# Patient Record
Sex: FEMALE | Race: WHITE | NOT HISPANIC OR LATINO | Employment: FULL TIME | URBAN - METROPOLITAN AREA
[De-identification: names, ages, dates, MRNs, and addresses within clinical notes are randomized per-mention and may not be internally consistent; named-entity substitution may affect disease eponyms.]

---

## 2020-09-18 DIAGNOSIS — M25.561 KNEE PAIN, RIGHT: Primary | ICD-10-CM

## 2020-10-08 ENCOUNTER — CLINICAL SUPPORT (OUTPATIENT)
Dept: REHABILITATION | Facility: HOSPITAL | Age: 70
End: 2020-10-08
Payer: MEDICARE

## 2020-10-08 DIAGNOSIS — M25.561 CHRONIC PAIN OF RIGHT KNEE: ICD-10-CM

## 2020-10-08 DIAGNOSIS — G89.29 CHRONIC PAIN OF RIGHT KNEE: ICD-10-CM

## 2020-10-08 DIAGNOSIS — M25.561 KNEE PAIN, RIGHT: ICD-10-CM

## 2020-10-08 PROCEDURE — 97161 PT EVAL LOW COMPLEX 20 MIN: CPT | Mod: PN

## 2020-10-08 PROCEDURE — 97110 THERAPEUTIC EXERCISES: CPT | Mod: PN

## 2020-10-08 NOTE — PLAN OF CARE
Physical Therapy Evaluation/Plan of Care    Name: Heri Jansen 1950  Clinic Number: [id number]    Diagnosis:   Encounter Diagnosis   Name Primary?    Knee pain, right      Physician: Mario Khan MD  Treatment Orders: PT Eval and Treat    Past Medical History:   Diagnosis Date    Anxiety      Current Outpatient Medications   Medication Sig    alprazolam (XANAX XR) 1 MG Tb24 Take 0.5 mg by mouth once daily.    hydrocodone-acetaminophen 7.5-325mg (NORCO) 7.5-325 mg per tablet Take 1 tablet by mouth every 6 (six) hours as needed for Pain.    lidocaine HCl 2% (XYLOCAINE) 2 % Soln Take 5 mLs by mouth every 4 (four) hours.     No current facility-administered medications for this visit.      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 female that presents to [facility] secondary to chronic right knee pain. Heri states she had 2 previous falls in the past few years precipitating progressive onset of right knee pain. Despite pain she has been able to continue working full time at [employer] managing it conservatively with steroid injections, most recently in July and again on 9/11/20. She is referred to physical therapy evaluation and treatment. Of note she has bilateral genu valgus, R > L, indicating DJD.    Imaging: available in Epic.    Pain: current 0/10, worst 4/10, best 0/10, stiff, intermittent  Radicular symptoms:  none  Aggravating factors: squatting, stretching up to reach overhead   Easing factors: OTC topicals    Pts goals: to stop having old age problems    Onset/PRECIOUS: gradual and progressive    Objective     Observation: Patient is a well developed, well nourished pleasant female in NAD.  Posture: bilateral genu valgus R > L    Range of Motion:   Knee Left active Left Passive Right Active R passive   Flexion 134  WNL   Extension 0 WNL 0 WNL     Lower Extremity Strength  Right LE  Left LE    Knee extension: 4+/5 Knee extension: 5/5   Knee flexion: 4/5 Knee flexion: 5/5   Hip flexion: 4+/5 Hip flexion: 5/5   Hip extension:  4-/5 Hip extension: 4/5   Hip abduction: 4/5 Hip abduction: 4+/5   Hip adduction: 5/5 Hip adduction 5/5   Ankle dorsiflexion: 5/5 Ankle dorsiflexion: 5/5   Ankle plantarflexion: 5/5 Ankle plantarflexion: 5/5     Special Tests:   Left Right   Valgus Stress Test     neg neg   Varus Stress test neg neg   Lachman's test neg neg   Posterior Lachman neg neg   Rita's Test neg neg   Apley's Compression neg neg   Apley's Distraction neg neg     - SLS R: can lift leg up independently but is unable to hold >5 sec utilizing stepping strategy to recover   - SLS L: can lift leg up independently but is unable to hold >5 sec  utilizing stepping strategy to recover   - 30 sec chair rise: 11 completed   Joint Mobility: no restrictions noted  Palpation: no significant ttp noted  Sensation: intact  Flexibility:  90/90 SLR = R minimal restriction, L minimal restriction           Ely's test: R moderate restriction, L moderate restriction           Gumaro's test: R no restriction, L no restriction           Ashwin test: R no restriction, L no restriction  Edema: no significant edema/swelling noted.    Functional Limitations Reports   Tool: LEFS  Score: 10% LE impairment (72/80)    TREATMENT:  Nustep Lv 3 x 10 min    Home Exercises and Patient Education Provided    Education provided re:   - progress towards goals   -  role of therapy in multi - disciplinary team, goals for therapy  Pt educated on condition, POC, and expectations in therapy.  No spiritual or educational barriers to learning provided    Home exercises: Pt will be provided HEP during course of treatment with progressions as appropriate. Pt was advised to perform these exercises free of pain, and to stop performing them if pain occurs.   LAURA demonstrated good  understanding of the education provided.     PT Evaluation Completed: Yes  Discussed Plan of Care with patient: Yes    Assessment     Heri is a 70 y.o. female referred to outpatient physical therapy and presents to PT with chronic OA right knee. Patient demonstrates limitations as described in the problem list. Pt will benefit from physcial therapy services in order to maximize pain free and/or functional use of right knee. The following goals were discussed with the patient and patient is in agreement with them as to be addressed in the treatment plan.   Pt prognosis is Good.   Pt will benefit from skilled outpatient Physical Therapy to address the deficits stated above and in the chart below, provide pt/family education, and to maximize pt's level of independence.     Anticipated barriers to physical therapy: none identified    Medical necessity is demonstrated by the following IMPAIRMENTS/PROBLEM LIST:  weakness and pain     GOALS:   Long Term Goals: 6 weeks  Pain: Decrease pain to 0/10 75% of the time to allow for improved function  Strength: Improve strength in right hip to knee to 5/5 for improved LE stability  Functional scale: Improve score on LEFS to 0%  Walking: Increase walking distance/duration to 1/4 mile without pain  Postures: Increase sitting and/or standing duration to 1 hour without pain   Transfers: Perform all transfers without increased pain or limitation  Exercise: demonstrate independence with home exercise program to maintain gains made in therapy.      Plan     Pt will be treated  by physical therapy 2 times a week for 6 weeks for Pt Education, HEP, therapeutic exercises, neuromuscular re-education, joint mobilizations, modalities prn to achieve established goals. Heri may at times be seen by a PTA as part of the Rehab Team.     Cont PT for 6 weeks.     Suman Hanna, PT    I certify the need for these services furnished under this plan of treatment and while under my care.______________________________ Physician/Referring Practitioner  Date of Signature        PT/PTA met face to face to discuss patient's treatment plan and progress towards established goals.  Treatment will be continued as described in initial report/eval and progress notes.  Patient will be seen by physical therapist every sixth visit and minimally once per month.

## 2020-10-16 ENCOUNTER — CLINICAL SUPPORT (OUTPATIENT)
Dept: REHABILITATION | Facility: HOSPITAL | Age: 70
End: 2020-10-16
Payer: MEDICARE

## 2020-10-16 DIAGNOSIS — G89.29 CHRONIC PAIN OF RIGHT KNEE: ICD-10-CM

## 2020-10-16 DIAGNOSIS — M25.561 CHRONIC PAIN OF RIGHT KNEE: ICD-10-CM

## 2020-10-16 DIAGNOSIS — R53.1 WEAKNESS: ICD-10-CM

## 2020-10-16 PROCEDURE — 97110 THERAPEUTIC EXERCISES: CPT | Mod: PN

## 2020-10-16 NOTE — PROGRESS NOTES
"                            Physical Therapy Daily Treatment Note   Name: Heri Jansen 1950  MRN: [id number]    Visit Date: 10/16/2020  Visit #: 2 / 12  Authorization period Expiration: 12/31/20    Plan of Care Expiration: 12/31/20  Precautions: standard    Time In: 11:00 am  Time Out: 11:45 am  Total 1:1 Treatment Time: 38 min    Treatment Diagnosis:   Encounter Diagnoses   Name Primary?    Weakness     Chronic pain of right knee      Physician: Mario Khan MD    Subjective   Pt reports: she is doing pretty good today.     Pain Scale:  2/10 on VAS currently  Pain Location: right lateral knee    Objective   LAURA received therapeutic exercises to develop strength, endurance, flexibility, posture and core stabilization for 38 minutes including:  Nustpep Lv 3 x 15 min  Supine HS stretch w strap, 20" h x 5 ea  B SAQ's w grey bolster w 5 " h x 2 min  SLR x 2 min ea  S/L hip abd x 2 min ea  H/L ball squeeze x 2 min  Supine clams w light green theraloop x 2 min    Home Exercises and Education Provided     Education provided re: use of ice/heat at home as needed for pain/swelling  - progress towards goals   - role of therapy in multi - disciplinary team, goals for therapy  Pt educated on condition, POC, and expectations in therapy.  No spiritual or educational barriers to learning provided    Home exercises:  Pt will be provided HEP during course of treatment with progressions as appropriate. Pt was advised to perform these exercises free of pain, and to stop performing them if pain occurs.   LAURA demonstrated good  understanding of the education provided.     Assessment   LAURA tolerated treatment without complication or increase pain reported. Patient with B genu valgus, R>L, secondary to OA. Will progress with strengthening exercises as tolerated.     Pt prognosis is Good. Pt will continue to benefit from skilled outpatient physical therapy to address the deficits listed in the problem list chart " on initial evaluation, provide pt/family education and to maximize pt's level of independence in the home and community environment.     Medical necessity is demonstrated by the impairments and functional limitations listed on the Initial Evaluation.     Anticipated barriers to physical therapy: none identified  Pt's spiritual, cultural and educational needs considered and pt agreeable to plan of care and goals.    Plan   Continue with established Plan of Care towards Physical Therapy goals.   Discussed Plan of Care with patient: Yes    Suman Hanna, PT  10/16/2020

## 2020-10-19 ENCOUNTER — CLINICAL SUPPORT (OUTPATIENT)
Dept: REHABILITATION | Facility: HOSPITAL | Age: 70
End: 2020-10-19
Payer: MEDICARE

## 2020-10-19 DIAGNOSIS — M25.561 CHRONIC PAIN OF RIGHT KNEE: ICD-10-CM

## 2020-10-19 DIAGNOSIS — R53.1 WEAKNESS: Primary | ICD-10-CM

## 2020-10-19 DIAGNOSIS — G89.29 CHRONIC PAIN OF RIGHT KNEE: ICD-10-CM

## 2020-10-19 PROCEDURE — 97110 THERAPEUTIC EXERCISES: CPT | Mod: PN,CQ

## 2020-10-19 NOTE — PROGRESS NOTES
"                            Physical Therapy Daily Treatment Note   Name: Heri Jansen 1950  MRN: [id number]    Visit Date: 10/19/2020  Visit #: 3 / 12  Authorization period Expiration: 12/31/20    Plan of Care Expiration: 12/31/20  Precautions: standard    Time In: 2:00 PM  Time Out: 3:00 PM  Total 1:1 Treatment Time: 45 min    Treatment Diagnosis:   Encounter Diagnoses   Name Primary?    Weakness Yes    Chronic pain of right knee      Physician: Mario Khan MD    Subjective   Pt reports: No new c/o's.     Pain Scale:  0/10 on VAS currently  Pain Location: right lateral knee    Objective   LAURA received therapeutic exercises to develop strength, endurance, flexibility, posture and core stabilization for 45 minutes including:  Nustpep Lv 3 x 20 min  Bridges x 15  Supine HS stretch w strap, 20" h x 5 ea  B SAQ's w grey bolster w 5 " h x 3 min  SLR 2 x 10  S/L hip abd x 20  H/L ball squeeze x 3 min  Supine clams w light green theraloop x 2 min    Home Exercises and Education Provided     Education provided re: use of ice/heat at home as needed for pain/swelling  - progress towards goals   - role of therapy in multi - disciplinary team, goals for therapy  Pt educated on condition, POC, and expectations in therapy.  No spiritual or educational barriers to learning provided    Home exercises:  Pt will be provided HEP during course of treatment with progressions as appropriate. Pt was advised to perform these exercises free of pain, and to stop performing them if pain occurs.   LAURA demonstrated good  understanding of the education provided.     Assessment   LAURA tolerated treatment without complication or increase pain reported. Patient with B genu valgus, R>L, secondary to OA. Will progress with strengthening exercises as tolerated.     Pt prognosis is Good. Pt will continue to benefit from skilled outpatient physical therapy to address the deficits listed in the problem list chart on initial " evaluation, provide pt/family education and to maximize pt's level of independence in the home and community environment.     Medical necessity is demonstrated by the impairments and functional limitations listed on the Initial Evaluation.     Anticipated barriers to physical therapy: none identified  Pt's spiritual, cultural and educational needs considered and pt agreeable to plan of care and goals.    Plan   Continue with established Plan of Care towards Physical Therapy goals.   Discussed Plan of Care with patient: Yes    Jonathan Favre, PTA  10/19/2020

## 2020-10-22 ENCOUNTER — CLINICAL SUPPORT (OUTPATIENT)
Dept: REHABILITATION | Facility: HOSPITAL | Age: 70
End: 2020-10-22
Payer: MEDICARE

## 2020-10-22 DIAGNOSIS — M25.561 CHRONIC PAIN OF RIGHT KNEE: ICD-10-CM

## 2020-10-22 DIAGNOSIS — G89.29 CHRONIC PAIN OF RIGHT KNEE: ICD-10-CM

## 2020-10-22 DIAGNOSIS — R53.1 WEAKNESS: Primary | ICD-10-CM

## 2020-10-22 PROCEDURE — 97110 THERAPEUTIC EXERCISES: CPT | Mod: PN,CQ

## 2020-10-22 NOTE — PROGRESS NOTES
"                            Physical Therapy Daily Treatment Note   Name: Heri Jansen 1950  MRN: [id number]    Visit Date: 10/22/2020  Visit #: 4 / 12  Authorization period Expiration: 12/31/20    Plan of Care Expiration: 12/31/20  Precautions: standard    Time In: 1:55 PM  Time Out: 2:45 PM  Total 1:1 Treatment Time: 45 min    Treatment Diagnosis:   Encounter Diagnoses   Name Primary?    Weakness Yes    Chronic pain of right knee      Physician: Mario Khan MD    Subjective   Pt reports: No new c/o's.     Pain Scale:  0/10 on VAS currently  Pain Location: right lateral knee    Objective   LAURA received therapeutic exercises to develop strength, endurance, flexibility, posture and core stabilization for 45 minutes including:  Nustpep Lv 3 x 20 min  Bridges x 15  Supine HS stretch w strap, 20" h x 5 ea  B SAQ's w grey bolster w 5 " h x 4 min  SLR 2 x 10  S/L hip abd x 20  H/L ball squeeze x 3 min  Supine clams w light green theraloop x 3 min    Home Exercises and Education Provided     Education provided re: use of ice/heat at home as needed for pain/swelling  - progress towards goals   - role of therapy in multi - disciplinary team, goals for therapy  Pt educated on condition, POC, and expectations in therapy.  No spiritual or educational barriers to learning provided    Home exercises:  Pt will be provided HEP during course of treatment with progressions as appropriate. Pt was advised to perform these exercises free of pain, and to stop performing them if pain occurs.   LAURA demonstrated good  understanding of the education provided.     Assessment   LAURA tolerated treatment without complication or increase pain reported. Patient with B genu valgus, R>L, secondary to OA. Will progress with strengthening exercises as tolerated.     Pt prognosis is Good. Pt will continue to benefit from skilled outpatient physical therapy to address the deficits listed in the problem list chart on initial " evaluation, provide pt/family education and to maximize pt's level of independence in the home and community environment.     Medical necessity is demonstrated by the impairments and functional limitations listed on the Initial Evaluation.     Anticipated barriers to physical therapy: none identified  Pt's spiritual, cultural and educational needs considered and pt agreeable to plan of care and goals.    Plan   Continue with established Plan of Care towards Physical Therapy goals.   Discussed Plan of Care with patient: Yes    Jonathan Favre, PTA  10/22/2020

## 2021-07-26 ENCOUNTER — PATIENT OUTREACH (OUTPATIENT)
Dept: ADMINISTRATIVE | Facility: HOSPITAL | Age: 71
End: 2021-07-26

## 2021-07-28 DIAGNOSIS — M47.9 SPONDYLOSIS: ICD-10-CM

## 2021-07-28 DIAGNOSIS — M54.50 LOW BACK PAIN: Primary | ICD-10-CM

## 2021-07-28 DIAGNOSIS — M81.0 OSTEOPOROSIS: ICD-10-CM

## 2021-08-06 ENCOUNTER — CLINICAL SUPPORT (OUTPATIENT)
Dept: REHABILITATION | Facility: HOSPITAL | Age: 71
End: 2021-08-06
Payer: MEDICARE

## 2021-08-06 DIAGNOSIS — M54.50 CHRONIC BILATERAL LOW BACK PAIN WITHOUT SCIATICA: ICD-10-CM

## 2021-08-06 DIAGNOSIS — M81.0 OSTEOPOROSIS: ICD-10-CM

## 2021-08-06 DIAGNOSIS — M25.561 CHRONIC PAIN OF RIGHT KNEE: ICD-10-CM

## 2021-08-06 DIAGNOSIS — M47.9 SPONDYLOSIS: ICD-10-CM

## 2021-08-06 DIAGNOSIS — M54.50 LOW BACK PAIN: ICD-10-CM

## 2021-08-06 DIAGNOSIS — R53.1 WEAKNESS: ICD-10-CM

## 2021-08-06 DIAGNOSIS — G89.29 CHRONIC PAIN OF RIGHT KNEE: ICD-10-CM

## 2021-08-06 DIAGNOSIS — G89.29 CHRONIC BILATERAL LOW BACK PAIN WITHOUT SCIATICA: ICD-10-CM

## 2021-08-06 PROCEDURE — 97161 PT EVAL LOW COMPLEX 20 MIN: CPT | Mod: PN

## 2021-08-13 ENCOUNTER — CLINICAL SUPPORT (OUTPATIENT)
Dept: REHABILITATION | Facility: HOSPITAL | Age: 71
End: 2021-08-13
Payer: MEDICARE

## 2021-08-13 DIAGNOSIS — R53.1 WEAKNESS: ICD-10-CM

## 2021-08-13 DIAGNOSIS — M54.50 LUMBAR PAIN: Primary | ICD-10-CM

## 2021-08-13 PROCEDURE — 97110 THERAPEUTIC EXERCISES: CPT | Mod: PN,CQ

## 2021-08-20 ENCOUNTER — CLINICAL SUPPORT (OUTPATIENT)
Dept: REHABILITATION | Facility: HOSPITAL | Age: 71
End: 2021-08-20
Payer: MEDICARE

## 2021-08-20 DIAGNOSIS — M54.50 LUMBAR PAIN: ICD-10-CM

## 2021-08-20 DIAGNOSIS — R53.1 WEAKNESS: Primary | ICD-10-CM

## 2021-08-20 PROBLEM — M25.561 CHRONIC PAIN OF RIGHT KNEE: Status: RESOLVED | Noted: 2020-10-16 | Resolved: 2021-08-20

## 2021-08-20 PROBLEM — G89.29 CHRONIC PAIN OF RIGHT KNEE: Status: RESOLVED | Noted: 2020-10-16 | Resolved: 2021-08-20

## 2021-08-20 PROCEDURE — 97110 THERAPEUTIC EXERCISES: CPT | Mod: PN,CQ

## 2021-08-27 ENCOUNTER — CLINICAL SUPPORT (OUTPATIENT)
Dept: REHABILITATION | Facility: HOSPITAL | Age: 71
End: 2021-08-27
Payer: MEDICARE

## 2021-08-27 DIAGNOSIS — M54.50 CHRONIC MIDLINE LOW BACK PAIN WITHOUT SCIATICA: ICD-10-CM

## 2021-08-27 DIAGNOSIS — G89.29 CHRONIC MIDLINE LOW BACK PAIN WITHOUT SCIATICA: ICD-10-CM

## 2021-08-27 PROCEDURE — 97110 THERAPEUTIC EXERCISES: CPT | Mod: PN

## 2021-09-10 ENCOUNTER — CLINICAL SUPPORT (OUTPATIENT)
Dept: REHABILITATION | Facility: HOSPITAL | Age: 71
End: 2021-09-10
Payer: MEDICARE

## 2021-09-10 DIAGNOSIS — M54.50 CHRONIC MIDLINE LOW BACK PAIN WITHOUT SCIATICA: Primary | ICD-10-CM

## 2021-09-10 DIAGNOSIS — G89.29 CHRONIC MIDLINE LOW BACK PAIN WITHOUT SCIATICA: Primary | ICD-10-CM

## 2021-09-10 PROCEDURE — 97110 THERAPEUTIC EXERCISES: CPT | Mod: PN,CQ

## 2021-09-24 ENCOUNTER — CLINICAL SUPPORT (OUTPATIENT)
Dept: REHABILITATION | Facility: HOSPITAL | Age: 71
End: 2021-09-24
Payer: MEDICARE

## 2021-09-24 DIAGNOSIS — M54.50 CHRONIC MIDLINE LOW BACK PAIN WITHOUT SCIATICA: Primary | ICD-10-CM

## 2021-09-24 DIAGNOSIS — G89.29 CHRONIC MIDLINE LOW BACK PAIN WITHOUT SCIATICA: Primary | ICD-10-CM

## 2021-09-24 PROCEDURE — 97110 THERAPEUTIC EXERCISES: CPT | Mod: PN,CQ

## 2021-10-01 ENCOUNTER — CLINICAL SUPPORT (OUTPATIENT)
Dept: REHABILITATION | Facility: HOSPITAL | Age: 71
End: 2021-10-01
Payer: MEDICARE

## 2021-10-01 DIAGNOSIS — M54.50 CHRONIC MIDLINE LOW BACK PAIN WITHOUT SCIATICA: Primary | ICD-10-CM

## 2021-10-01 DIAGNOSIS — G89.29 CHRONIC MIDLINE LOW BACK PAIN WITHOUT SCIATICA: Primary | ICD-10-CM

## 2021-10-01 PROCEDURE — 97110 THERAPEUTIC EXERCISES: CPT | Mod: PN,CQ

## 2021-10-08 ENCOUNTER — CLINICAL SUPPORT (OUTPATIENT)
Dept: REHABILITATION | Facility: HOSPITAL | Age: 71
End: 2021-10-08
Payer: MEDICARE

## 2021-10-08 DIAGNOSIS — G89.29 CHRONIC MIDLINE LOW BACK PAIN WITHOUT SCIATICA: Primary | ICD-10-CM

## 2021-10-08 DIAGNOSIS — M54.50 CHRONIC MIDLINE LOW BACK PAIN WITHOUT SCIATICA: Primary | ICD-10-CM

## 2021-10-08 PROCEDURE — 97110 THERAPEUTIC EXERCISES: CPT | Mod: PN,CQ

## 2021-10-15 ENCOUNTER — CLINICAL SUPPORT (OUTPATIENT)
Dept: REHABILITATION | Facility: HOSPITAL | Age: 71
End: 2021-10-15
Payer: MEDICARE

## 2021-10-15 DIAGNOSIS — G89.29 CHRONIC MIDLINE LOW BACK PAIN WITHOUT SCIATICA: ICD-10-CM

## 2021-10-15 DIAGNOSIS — M54.50 CHRONIC MIDLINE LOW BACK PAIN WITHOUT SCIATICA: ICD-10-CM

## 2021-10-15 PROCEDURE — 97110 THERAPEUTIC EXERCISES: CPT | Mod: PN

## 2021-10-21 ENCOUNTER — TELEPHONE (OUTPATIENT)
Dept: FAMILY MEDICINE | Facility: CLINIC | Age: 71
End: 2021-10-21

## 2021-10-21 ENCOUNTER — PATIENT MESSAGE (OUTPATIENT)
Dept: FAMILY MEDICINE | Facility: CLINIC | Age: 71
End: 2021-10-21
Payer: MEDICARE

## 2021-10-29 ENCOUNTER — CLINICAL SUPPORT (OUTPATIENT)
Dept: REHABILITATION | Facility: HOSPITAL | Age: 71
End: 2021-10-29
Payer: MEDICARE

## 2021-10-29 DIAGNOSIS — M54.50 CHRONIC MIDLINE LOW BACK PAIN WITHOUT SCIATICA: Primary | ICD-10-CM

## 2021-10-29 DIAGNOSIS — G89.29 CHRONIC MIDLINE LOW BACK PAIN WITHOUT SCIATICA: Primary | ICD-10-CM

## 2021-10-29 PROCEDURE — 97110 THERAPEUTIC EXERCISES: CPT | Mod: PN,CQ

## 2021-11-11 ENCOUNTER — HOSPITAL ENCOUNTER (INPATIENT)
Facility: HOSPITAL | Age: 71
LOS: 7 days | Discharge: HOME HEALTH | DRG: 522 | End: 2021-11-18
Attending: HOSPITALIST | Admitting: HOSPITALIST
Payer: MEDICARE

## 2021-11-11 DIAGNOSIS — Z01.818 PRE-OP EXAM: ICD-10-CM

## 2021-11-11 DIAGNOSIS — S72.001A FRACTURE OF FEMORAL NECK, RIGHT: ICD-10-CM
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
## 2021-11-11 DIAGNOSIS — Z01.818 PREOP EXAMINATION: ICD-10-CM

## 2021-11-11 DIAGNOSIS — S72.001A CLOSED FRACTURE OF NECK OF RIGHT FEMUR, INITIAL ENCOUNTER: ICD-10-CM

## 2021-11-11 DIAGNOSIS — R07.9 CHEST PAIN: ICD-10-CM

## 2021-11-11 PROBLEM — F10.10 ALCOHOL ABUSE: Status: ACTIVE | Noted: 2021-11-11

## 2021-11-11 PROBLEM — K21.9 GERD (GASTROESOPHAGEAL REFLUX DISEASE): Status: ACTIVE | Noted: 2021-11-11

## 2021-11-11 LAB
ABO + RH BLD: NORMAL
ALBUMIN SERPL BCP-MCNC: 3.4 G/DL (ref 3.5–5.2)
ALP SERPL-CCNC: 74 U/L (ref 55–135)
ALT SERPL W/O P-5'-P-CCNC: 17 U/L (ref 10–44)
ANION GAP SERPL CALC-SCNC: 10 MMOL/L (ref 8–16)
AST SERPL-CCNC: 20 U/L (ref 10–40)
BASOPHILS # BLD AUTO: 0.03 K/UL (ref 0–0.2)
BASOPHILS NFR BLD: 0.3 % (ref 0–1.9)
BILIRUB SERPL-MCNC: 2.3 MG/DL (ref 0.1–1)
BLD GP AB SCN CELLS X3 SERPL QL: NORMAL
BUN SERPL-MCNC: 8 MG/DL (ref 8–23)
CALCIUM SERPL-MCNC: 8.5 MG/DL (ref 8.7–10.5)
CHLORIDE SERPL-SCNC: 102 MMOL/L (ref 95–110)
CO2 SERPL-SCNC: 21 MMOL/L (ref 23–29)
CREAT SERPL-MCNC: 0.8 MG/DL (ref 0.5–1.4)
DIFFERENTIAL METHOD: ABNORMAL
EOSINOPHIL # BLD AUTO: 0 K/UL (ref 0–0.5)
EOSINOPHIL NFR BLD: 0.3 % (ref 0–8)
ERYTHROCYTE [DISTWIDTH] IN BLOOD BY AUTOMATED COUNT: 12.3 % (ref 11.5–14.5)
EST. GFR  (AFRICAN AMERICAN): >60 ML/MIN/1.73 M^2
EST. GFR  (NON AFRICAN AMERICAN): >60 ML/MIN/1.73 M^2
GLUCOSE SERPL-MCNC: 110 MG/DL (ref 70–110)
HCT VFR BLD AUTO: 35.8 % (ref 37–48.5)
HGB BLD-MCNC: 11.8 G/DL (ref 12–16)
IMM GRANULOCYTES # BLD AUTO: 0.03 K/UL (ref 0–0.04)
IMM GRANULOCYTES NFR BLD AUTO: 0.3 % (ref 0–0.5)
LYMPHOCYTES # BLD AUTO: 1.1 K/UL (ref 1–4.8)
LYMPHOCYTES NFR BLD: 11.9 % (ref 18–48)
MCH RBC QN AUTO: 30.6 PG (ref 27–31)
MCHC RBC AUTO-ENTMCNC: 33 G/DL (ref 32–36)
MCV RBC AUTO: 93 FL (ref 82–98)
MONOCYTES # BLD AUTO: 0.6 K/UL (ref 0.3–1)
MONOCYTES NFR BLD: 7 % (ref 4–15)
NEUTROPHILS # BLD AUTO: 7.1 K/UL (ref 1.8–7.7)
NEUTROPHILS NFR BLD: 80.2 % (ref 38–73)
NRBC BLD-RTO: 0 /100 WBC
PLATELET # BLD AUTO: 255 K/UL (ref 150–450)
PMV BLD AUTO: 10.1 FL (ref 9.2–12.9)
POTASSIUM SERPL-SCNC: 4.4 MMOL/L (ref 3.5–5.1)
PROT SERPL-MCNC: 6.8 G/DL (ref 6–8.4)
RBC # BLD AUTO: 3.86 M/UL (ref 4–5.4)
SARS-COV-2 RDRP RESP QL NAA+PROBE: NEGATIVE
SODIUM SERPL-SCNC: 133 MMOL/L (ref 136–145)
WBC # BLD AUTO: 8.84 K/UL (ref 3.9–12.7)

## 2021-11-11 PROCEDURE — 25000003 PHARM REV CODE 250: Performed by: NURSE PRACTITIONER

## 2021-11-11 PROCEDURE — 86900 BLOOD TYPING SEROLOGIC ABO: CPT | Performed by: PHYSICIAN ASSISTANT

## 2021-11-11 PROCEDURE — 93005 ELECTROCARDIOGRAM TRACING: CPT

## 2021-11-11 PROCEDURE — 93010 ELECTROCARDIOGRAM REPORT: CPT | Mod: ,,, | Performed by: GENERAL PRACTICE

## 2021-11-11 PROCEDURE — 96360 HYDRATION IV INFUSION INIT: CPT

## 2021-11-11 PROCEDURE — 36415 COLL VENOUS BLD VENIPUNCTURE: CPT | Performed by: HOSPITALIST

## 2021-11-11 PROCEDURE — 96372 THER/PROPH/DIAG INJ SC/IM: CPT

## 2021-11-11 PROCEDURE — 85025 COMPLETE CBC W/AUTO DIFF WBC: CPT | Performed by: PHYSICIAN ASSISTANT

## 2021-11-11 PROCEDURE — 25000003 PHARM REV CODE 250: Performed by: HOSPITALIST

## 2021-11-11 PROCEDURE — 80053 COMPREHEN METABOLIC PANEL: CPT | Performed by: PHYSICIAN ASSISTANT

## 2021-11-11 PROCEDURE — 25000003 PHARM REV CODE 250: Performed by: PHYSICIAN ASSISTANT

## 2021-11-11 PROCEDURE — 63600175 PHARM REV CODE 636 W HCPCS: Performed by: PHYSICIAN ASSISTANT

## 2021-11-11 PROCEDURE — 12000002 HC ACUTE/MED SURGE SEMI-PRIVATE ROOM

## 2021-11-11 PROCEDURE — U0002 COVID-19 LAB TEST NON-CDC: HCPCS | Performed by: HOSPITALIST

## 2021-11-11 PROCEDURE — 99900035 HC TECH TIME PER 15 MIN (STAT)

## 2021-11-11 PROCEDURE — 36415 COLL VENOUS BLD VENIPUNCTURE: CPT | Performed by: PHYSICIAN ASSISTANT

## 2021-11-11 PROCEDURE — 93010 EKG 12-LEAD: ICD-10-PCS | Mod: ,,, | Performed by: GENERAL PRACTICE

## 2021-11-11 PROCEDURE — 99285 EMERGENCY DEPT VISIT HI MDM: CPT | Mod: 25

## 2021-11-11 RX ORDER — IBUPROFEN 200 MG
16 TABLET ORAL
Status: DISCONTINUED | OUTPATIENT
Start: 2021-11-11 | End: 2021-11-18 | Stop reason: HOSPADM

## 2021-11-11 RX ORDER — HYDROCODONE BITARTRATE AND ACETAMINOPHEN 5; 325 MG/1; MG/1
1 TABLET ORAL
Status: DISCONTINUED | OUTPATIENT
Start: 2021-11-11 | End: 2021-11-11

## 2021-11-11 RX ORDER — GLUCAGON 1 MG
1 KIT INJECTION
Status: DISCONTINUED | OUTPATIENT
Start: 2021-11-11 | End: 2021-11-18 | Stop reason: HOSPADM

## 2021-11-11 RX ORDER — LORAZEPAM 2 MG/ML
1 INJECTION INTRAMUSCULAR EVERY 4 HOURS PRN
Status: DISCONTINUED | OUTPATIENT
Start: 2021-11-11 | End: 2021-11-18 | Stop reason: HOSPADM

## 2021-11-11 RX ORDER — IBUPROFEN 200 MG
24 TABLET ORAL
Status: DISCONTINUED | OUTPATIENT
Start: 2021-11-11 | End: 2021-11-18 | Stop reason: HOSPADM

## 2021-11-11 RX ORDER — TALC
6 POWDER (GRAM) TOPICAL NIGHTLY PRN
Status: DISCONTINUED | OUTPATIENT
Start: 2021-11-11 | End: 2021-11-18 | Stop reason: HOSPADM

## 2021-11-11 RX ORDER — HYDROCODONE BITARTRATE AND ACETAMINOPHEN 10; 325 MG/1; MG/1
1 TABLET ORAL EVERY 6 HOURS PRN
Status: DISCONTINUED | OUTPATIENT
Start: 2021-11-11 | End: 2021-11-18 | Stop reason: HOSPADM

## 2021-11-11 RX ORDER — SODIUM,POTASSIUM PHOSPHATES 280-250MG
2 POWDER IN PACKET (EA) ORAL
Status: DISCONTINUED | OUTPATIENT
Start: 2021-11-11 | End: 2021-11-18 | Stop reason: HOSPADM

## 2021-11-11 RX ORDER — LANOLIN ALCOHOL/MO/W.PET/CERES
800 CREAM (GRAM) TOPICAL
Status: DISCONTINUED | OUTPATIENT
Start: 2021-11-11 | End: 2021-11-18 | Stop reason: HOSPADM

## 2021-11-11 RX ORDER — MAG HYDROX/ALUMINUM HYD/SIMETH 200-200-20
30 SUSPENSION, ORAL (FINAL DOSE FORM) ORAL 4 TIMES DAILY PRN
Status: DISCONTINUED | OUTPATIENT
Start: 2021-11-11 | End: 2021-11-18 | Stop reason: HOSPADM

## 2021-11-11 RX ORDER — MORPHINE SULFATE 4 MG/ML
4 INJECTION, SOLUTION INTRAMUSCULAR; INTRAVENOUS
Status: COMPLETED | OUTPATIENT
Start: 2021-11-11 | End: 2021-11-11

## 2021-11-11 RX ORDER — ACETAMINOPHEN 325 MG/1
650 TABLET ORAL EVERY 4 HOURS PRN
Status: DISCONTINUED | OUTPATIENT
Start: 2021-11-11 | End: 2021-11-18 | Stop reason: HOSPADM

## 2021-11-11 RX ORDER — ONDANSETRON 8 MG/1
8 TABLET, ORALLY DISINTEGRATING ORAL EVERY 8 HOURS PRN
Status: DISCONTINUED | OUTPATIENT
Start: 2021-11-11 | End: 2021-11-18 | Stop reason: HOSPADM

## 2021-11-11 RX ORDER — SODIUM CHLORIDE 0.9 % (FLUSH) 0.9 %
10 SYRINGE (ML) INJECTION EVERY 8 HOURS PRN
Status: DISCONTINUED | OUTPATIENT
Start: 2021-11-11 | End: 2021-11-18 | Stop reason: HOSPADM

## 2021-11-11 RX ORDER — SIMETHICONE 80 MG
1 TABLET,CHEWABLE ORAL 4 TIMES DAILY PRN
Status: DISCONTINUED | OUTPATIENT
Start: 2021-11-11 | End: 2021-11-18 | Stop reason: HOSPADM

## 2021-11-11 RX ORDER — ALPRAZOLAM 0.25 MG/1
0.5 TABLET ORAL ONCE
Status: COMPLETED | OUTPATIENT
Start: 2021-11-11 | End: 2021-11-11

## 2021-11-11 RX ORDER — MORPHINE SULFATE 2 MG/ML
2 INJECTION, SOLUTION INTRAMUSCULAR; INTRAVENOUS
Status: COMPLETED | OUTPATIENT
Start: 2021-11-11 | End: 2021-11-11

## 2021-11-11 RX ORDER — HYDROCODONE BITARTRATE AND ACETAMINOPHEN 5; 325 MG/1; MG/1
1 TABLET ORAL EVERY 6 HOURS PRN
Status: DISCONTINUED | OUTPATIENT
Start: 2021-11-11 | End: 2021-11-18 | Stop reason: HOSPADM

## 2021-11-11 RX ORDER — IPRATROPIUM BROMIDE AND ALBUTEROL SULFATE 2.5; .5 MG/3ML; MG/3ML
3 SOLUTION RESPIRATORY (INHALATION) EVERY 6 HOURS PRN
Status: DISCONTINUED | OUTPATIENT
Start: 2021-11-11 | End: 2021-11-18 | Stop reason: HOSPADM

## 2021-11-11 RX ORDER — NALOXONE HCL 0.4 MG/ML
0.02 VIAL (ML) INJECTION
Status: DISCONTINUED | OUTPATIENT
Start: 2021-11-11 | End: 2021-11-18 | Stop reason: HOSPADM

## 2021-11-11 RX ORDER — POLYETHYLENE GLYCOL 3350 17 G/17G
17 POWDER, FOR SOLUTION ORAL DAILY
Status: DISCONTINUED | OUTPATIENT
Start: 2021-11-12 | End: 2021-11-14

## 2021-11-11 RX ORDER — PROCHLORPERAZINE EDISYLATE 5 MG/ML
5 INJECTION INTRAMUSCULAR; INTRAVENOUS EVERY 6 HOURS PRN
Status: DISCONTINUED | OUTPATIENT
Start: 2021-11-11 | End: 2021-11-18 | Stop reason: HOSPADM

## 2021-11-11 RX ORDER — ENOXAPARIN SODIUM 100 MG/ML
40 INJECTION SUBCUTANEOUS EVERY 24 HOURS
Status: DISCONTINUED | OUTPATIENT
Start: 2021-11-11 | End: 2021-11-18 | Stop reason: HOSPADM

## 2021-11-11 RX ORDER — ONDANSETRON 4 MG/1
4 TABLET, ORALLY DISINTEGRATING ORAL
Status: COMPLETED | OUTPATIENT
Start: 2021-11-11 | End: 2021-11-11

## 2021-11-11 RX ADMIN — HYDROCODONE BITARTRATE AND ACETAMINOPHEN 1 TABLET: 5; 325 TABLET ORAL at 08:11

## 2021-11-11 RX ADMIN — MORPHINE SULFATE 4 MG: 4 INJECTION, SOLUTION INTRAMUSCULAR; INTRAVENOUS at 04:11

## 2021-11-11 RX ADMIN — SODIUM CHLORIDE 500 ML: 0.9 INJECTION, SOLUTION INTRAVENOUS at 04:11

## 2021-11-11 RX ADMIN — MORPHINE SULFATE 2 MG: 2 INJECTION, SOLUTION INTRAMUSCULAR; INTRAVENOUS at 12:11

## 2021-11-11 RX ADMIN — ALPRAZOLAM 0.5 MG: 0.25 TABLET ORAL at 08:11

## 2021-11-11 RX ADMIN — ONDANSETRON 4 MG: 4 TABLET, ORALLY DISINTEGRATING ORAL at 12:11

## 2021-11-12 ENCOUNTER — TELEPHONE (OUTPATIENT)
Dept: ORTHOPEDICS | Facility: CLINIC | Age: 71
End: 2021-11-12

## 2021-11-12 PROBLEM — F10.20 ALCOHOL DEPENDENCE: Status: ACTIVE | Noted: 2021-11-11

## 2021-11-12 LAB
ALBUMIN SERPL BCP-MCNC: 2.9 G/DL (ref 3.5–5.2)
ALP SERPL-CCNC: 62 U/L (ref 55–135)
ALT SERPL W/O P-5'-P-CCNC: 10 U/L (ref 10–44)
ANION GAP SERPL CALC-SCNC: 7 MMOL/L (ref 8–16)
AST SERPL-CCNC: 17 U/L (ref 10–40)
BASOPHILS # BLD AUTO: 0.06 K/UL (ref 0–0.2)
BASOPHILS NFR BLD: 0.8 % (ref 0–1.9)
BILIRUB SERPL-MCNC: 2 MG/DL (ref 0.1–1)
BUN SERPL-MCNC: 6 MG/DL (ref 8–23)
CALCIUM SERPL-MCNC: 8.2 MG/DL (ref 8.7–10.5)
CHLORIDE SERPL-SCNC: 107 MMOL/L (ref 95–110)
CO2 SERPL-SCNC: 22 MMOL/L (ref 23–29)
CREAT SERPL-MCNC: 0.7 MG/DL (ref 0.5–1.4)
DIFFERENTIAL METHOD: ABNORMAL
EOSINOPHIL # BLD AUTO: 0.4 K/UL (ref 0–0.5)
EOSINOPHIL NFR BLD: 4.5 % (ref 0–8)
ERYTHROCYTE [DISTWIDTH] IN BLOOD BY AUTOMATED COUNT: 12.5 % (ref 11.5–14.5)
EST. GFR  (AFRICAN AMERICAN): >60 ML/MIN/1.73 M^2
EST. GFR  (NON AFRICAN AMERICAN): >60 ML/MIN/1.73 M^2
GLUCOSE SERPL-MCNC: 95 MG/DL (ref 70–110)
HCT VFR BLD AUTO: 35.5 % (ref 37–48.5)
HGB BLD-MCNC: 11.9 G/DL (ref 12–16)
IMM GRANULOCYTES # BLD AUTO: 0.02 K/UL (ref 0–0.04)
IMM GRANULOCYTES NFR BLD AUTO: 0.3 % (ref 0–0.5)
LYMPHOCYTES # BLD AUTO: 0.9 K/UL (ref 1–4.8)
LYMPHOCYTES NFR BLD: 12 % (ref 18–48)
MAGNESIUM SERPL-MCNC: 1.9 MG/DL (ref 1.6–2.6)
MCH RBC QN AUTO: 31.1 PG (ref 27–31)
MCHC RBC AUTO-ENTMCNC: 33.5 G/DL (ref 32–36)
MCV RBC AUTO: 93 FL (ref 82–98)
MONOCYTES # BLD AUTO: 0.5 K/UL (ref 0.3–1)
MONOCYTES NFR BLD: 6.2 % (ref 4–15)
NEUTROPHILS # BLD AUTO: 6 K/UL (ref 1.8–7.7)
NEUTROPHILS NFR BLD: 76.2 % (ref 38–73)
NRBC BLD-RTO: 0 /100 WBC
PHOSPHATE SERPL-MCNC: 2.8 MG/DL (ref 2.7–4.5)
PLATELET # BLD AUTO: 224 K/UL (ref 150–450)
PMV BLD AUTO: 9.9 FL (ref 9.2–12.9)
POTASSIUM SERPL-SCNC: 3.8 MMOL/L (ref 3.5–5.1)
PROT SERPL-MCNC: 6.1 G/DL (ref 6–8.4)
RBC # BLD AUTO: 3.83 M/UL (ref 4–5.4)
SODIUM SERPL-SCNC: 136 MMOL/L (ref 136–145)
WBC # BLD AUTO: 7.85 K/UL (ref 3.9–12.7)

## 2021-11-12 PROCEDURE — 85025 COMPLETE CBC W/AUTO DIFF WBC: CPT | Performed by: HOSPITALIST

## 2021-11-12 PROCEDURE — 99223 1ST HOSP IP/OBS HIGH 75: CPT | Mod: ,,, | Performed by: ORTHOPAEDIC SURGERY

## 2021-11-12 PROCEDURE — 12000002 HC ACUTE/MED SURGE SEMI-PRIVATE ROOM

## 2021-11-12 PROCEDURE — 94761 N-INVAS EAR/PLS OXIMETRY MLT: CPT

## 2021-11-12 PROCEDURE — 84100 ASSAY OF PHOSPHORUS: CPT | Performed by: HOSPITALIST

## 2021-11-12 PROCEDURE — 99223 PR INITIAL HOSPITAL CARE,LEVL III: ICD-10-PCS | Mod: ,,, | Performed by: ORTHOPAEDIC SURGERY

## 2021-11-12 PROCEDURE — 63600175 PHARM REV CODE 636 W HCPCS: Performed by: NURSE PRACTITIONER

## 2021-11-12 PROCEDURE — 99900035 HC TECH TIME PER 15 MIN (STAT)

## 2021-11-12 PROCEDURE — 63600175 PHARM REV CODE 636 W HCPCS: Performed by: HOSPITALIST

## 2021-11-12 PROCEDURE — 83735 ASSAY OF MAGNESIUM: CPT | Performed by: HOSPITALIST

## 2021-11-12 PROCEDURE — 80053 COMPREHEN METABOLIC PANEL: CPT | Performed by: HOSPITALIST

## 2021-11-12 PROCEDURE — 36415 COLL VENOUS BLD VENIPUNCTURE: CPT | Performed by: HOSPITALIST

## 2021-11-12 PROCEDURE — 25000003 PHARM REV CODE 250: Performed by: NURSE PRACTITIONER

## 2021-11-12 PROCEDURE — 25000003 PHARM REV CODE 250: Performed by: HOSPITALIST

## 2021-11-12 RX ORDER — ALPRAZOLAM 0.25 MG/1
0.5 TABLET ORAL 2 TIMES DAILY PRN
Status: DISCONTINUED | OUTPATIENT
Start: 2021-11-12 | End: 2021-11-18 | Stop reason: HOSPADM

## 2021-11-12 RX ORDER — MORPHINE SULFATE 4 MG/ML
4 INJECTION, SOLUTION INTRAMUSCULAR; INTRAVENOUS ONCE
Status: COMPLETED | OUTPATIENT
Start: 2021-11-12 | End: 2021-11-12

## 2021-11-12 RX ADMIN — HYDROCODONE BITARTRATE AND ACETAMINOPHEN 1 TABLET: 10; 325 TABLET ORAL at 05:11

## 2021-11-12 RX ADMIN — MORPHINE SULFATE 4 MG: 4 INJECTION INTRAVENOUS at 10:11

## 2021-11-12 RX ADMIN — HYDROCODONE BITARTRATE AND ACETAMINOPHEN 1 TABLET: 5; 325 TABLET ORAL at 02:11

## 2021-11-12 RX ADMIN — ENOXAPARIN SODIUM 40 MG: 40 INJECTION SUBCUTANEOUS at 05:11

## 2021-11-12 RX ADMIN — ALPRAZOLAM 0.5 MG: 0.25 TABLET ORAL at 10:11

## 2021-11-12 RX ADMIN — HYDROCODONE BITARTRATE AND ACETAMINOPHEN 1 TABLET: 10; 325 TABLET ORAL at 12:11

## 2021-11-13 ENCOUNTER — ANESTHESIA (OUTPATIENT)
Dept: SURGERY | Facility: HOSPITAL | Age: 71
DRG: 522 | End: 2021-11-13
Payer: MEDICARE

## 2021-11-13 ENCOUNTER — ANESTHESIA EVENT (OUTPATIENT)
Dept: SURGERY | Facility: HOSPITAL | Age: 71
DRG: 522 | End: 2021-11-13
Payer: MEDICARE

## 2021-11-13 LAB
ALBUMIN SERPL BCP-MCNC: 2.9 G/DL (ref 3.5–5.2)
ALP SERPL-CCNC: 65 U/L (ref 55–135)
ALT SERPL W/O P-5'-P-CCNC: 10 U/L (ref 10–44)
ANION GAP SERPL CALC-SCNC: 8 MMOL/L (ref 8–16)
AST SERPL-CCNC: 13 U/L (ref 10–40)
BASOPHILS # BLD AUTO: 0.05 K/UL (ref 0–0.2)
BASOPHILS NFR BLD: 0.7 % (ref 0–1.9)
BILIRUB SERPL-MCNC: 1.2 MG/DL (ref 0.1–1)
BUN SERPL-MCNC: 6 MG/DL (ref 8–23)
CALCIUM SERPL-MCNC: 8.3 MG/DL (ref 8.7–10.5)
CHLORIDE SERPL-SCNC: 104 MMOL/L (ref 95–110)
CO2 SERPL-SCNC: 23 MMOL/L (ref 23–29)
CREAT SERPL-MCNC: 0.7 MG/DL (ref 0.5–1.4)
DIFFERENTIAL METHOD: ABNORMAL
EOSINOPHIL # BLD AUTO: 0.4 K/UL (ref 0–0.5)
EOSINOPHIL NFR BLD: 5.6 % (ref 0–8)
ERYTHROCYTE [DISTWIDTH] IN BLOOD BY AUTOMATED COUNT: 12.6 % (ref 11.5–14.5)
EST. GFR  (AFRICAN AMERICAN): >60 ML/MIN/1.73 M^2
EST. GFR  (NON AFRICAN AMERICAN): >60 ML/MIN/1.73 M^2
GLUCOSE SERPL-MCNC: 103 MG/DL (ref 70–110)
HCT VFR BLD AUTO: 36.4 % (ref 37–48.5)
HGB BLD-MCNC: 12 G/DL (ref 12–16)
IMM GRANULOCYTES # BLD AUTO: 0.02 K/UL (ref 0–0.04)
IMM GRANULOCYTES NFR BLD AUTO: 0.3 % (ref 0–0.5)
LYMPHOCYTES # BLD AUTO: 1.3 K/UL (ref 1–4.8)
LYMPHOCYTES NFR BLD: 19.7 % (ref 18–48)
MAGNESIUM SERPL-MCNC: 1.8 MG/DL (ref 1.6–2.6)
MCH RBC QN AUTO: 30.3 PG (ref 27–31)
MCHC RBC AUTO-ENTMCNC: 33 G/DL (ref 32–36)
MCV RBC AUTO: 92 FL (ref 82–98)
MONOCYTES # BLD AUTO: 0.5 K/UL (ref 0.3–1)
MONOCYTES NFR BLD: 7.7 % (ref 4–15)
NEUTROPHILS # BLD AUTO: 4.4 K/UL (ref 1.8–7.7)
NEUTROPHILS NFR BLD: 66 % (ref 38–73)
NRBC BLD-RTO: 0 /100 WBC
PHOSPHATE SERPL-MCNC: 2.4 MG/DL (ref 2.7–4.5)
PLATELET # BLD AUTO: 238 K/UL (ref 150–450)
PMV BLD AUTO: 10.2 FL (ref 9.2–12.9)
POTASSIUM SERPL-SCNC: 4 MMOL/L (ref 3.5–5.1)
PROT SERPL-MCNC: 6.3 G/DL (ref 6–8.4)
RBC # BLD AUTO: 3.96 M/UL (ref 4–5.4)
SODIUM SERPL-SCNC: 135 MMOL/L (ref 136–145)
WBC # BLD AUTO: 6.74 K/UL (ref 3.9–12.7)

## 2021-11-13 PROCEDURE — 25000003 PHARM REV CODE 250: Performed by: NURSE ANESTHETIST, CERTIFIED REGISTERED

## 2021-11-13 PROCEDURE — 71000039 HC RECOVERY, EACH ADD'L HOUR: Performed by: ORTHOPAEDIC SURGERY

## 2021-11-13 PROCEDURE — D9220A PRA ANESTHESIA: Mod: CRNA,,, | Performed by: NURSE ANESTHETIST, CERTIFIED REGISTERED

## 2021-11-13 PROCEDURE — 27200651 HC AIRWAY, LMA: Performed by: ANESTHESIOLOGY

## 2021-11-13 PROCEDURE — 83735 ASSAY OF MAGNESIUM: CPT | Performed by: HOSPITALIST

## 2021-11-13 PROCEDURE — 94761 N-INVAS EAR/PLS OXIMETRY MLT: CPT

## 2021-11-13 PROCEDURE — 25000003 PHARM REV CODE 250: Performed by: ORTHOPAEDIC SURGERY

## 2021-11-13 PROCEDURE — 36000710: Performed by: ORTHOPAEDIC SURGERY

## 2021-11-13 PROCEDURE — D9220A PRA ANESTHESIA: Mod: ANES,,, | Performed by: ANESTHESIOLOGY

## 2021-11-13 PROCEDURE — D9220A PRA ANESTHESIA: ICD-10-PCS | Mod: ANES,,, | Performed by: ANESTHESIOLOGY

## 2021-11-13 PROCEDURE — 25000003 PHARM REV CODE 250: Performed by: HOSPITALIST

## 2021-11-13 PROCEDURE — 63600175 PHARM REV CODE 636 W HCPCS: Performed by: ORTHOPAEDIC SURGERY

## 2021-11-13 PROCEDURE — 63600175 PHARM REV CODE 636 W HCPCS: Performed by: ANESTHESIOLOGY

## 2021-11-13 PROCEDURE — 25000003 PHARM REV CODE 250: Performed by: ANESTHESIOLOGY

## 2021-11-13 PROCEDURE — 85025 COMPLETE CBC W/AUTO DIFF WBC: CPT | Performed by: HOSPITALIST

## 2021-11-13 PROCEDURE — 37000008 HC ANESTHESIA 1ST 15 MINUTES: Performed by: ORTHOPAEDIC SURGERY

## 2021-11-13 PROCEDURE — C1776 JOINT DEVICE (IMPLANTABLE): HCPCS | Performed by: ORTHOPAEDIC SURGERY

## 2021-11-13 PROCEDURE — 99900035 HC TECH TIME PER 15 MIN (STAT)

## 2021-11-13 PROCEDURE — 36415 COLL VENOUS BLD VENIPUNCTURE: CPT | Performed by: HOSPITALIST

## 2021-11-13 PROCEDURE — 71000033 HC RECOVERY, INTIAL HOUR: Performed by: ORTHOPAEDIC SURGERY

## 2021-11-13 PROCEDURE — 99223 PR INITIAL HOSPITAL CARE,LEVL III: ICD-10-PCS | Mod: AI,57,, | Performed by: ORTHOPAEDIC SURGERY

## 2021-11-13 PROCEDURE — 80053 COMPREHEN METABOLIC PANEL: CPT | Performed by: HOSPITALIST

## 2021-11-13 PROCEDURE — D9220A PRA ANESTHESIA: ICD-10-PCS | Mod: CRNA,,, | Performed by: NURSE ANESTHETIST, CERTIFIED REGISTERED

## 2021-11-13 PROCEDURE — 99223 1ST HOSP IP/OBS HIGH 75: CPT | Mod: AI,57,, | Performed by: ORTHOPAEDIC SURGERY

## 2021-11-13 PROCEDURE — 12000002 HC ACUTE/MED SURGE SEMI-PRIVATE ROOM

## 2021-11-13 PROCEDURE — 63600175 PHARM REV CODE 636 W HCPCS: Performed by: NURSE ANESTHETIST, CERTIFIED REGISTERED

## 2021-11-13 PROCEDURE — 63600175 PHARM REV CODE 636 W HCPCS: Performed by: HOSPITALIST

## 2021-11-13 PROCEDURE — 94799 UNLISTED PULMONARY SVC/PX: CPT

## 2021-11-13 PROCEDURE — 27236 PR FEMORAL FX, OPEN TX: ICD-10-PCS | Mod: RT,,, | Performed by: ORTHOPAEDIC SURGERY

## 2021-11-13 PROCEDURE — 27236 TREAT THIGH FRACTURE: CPT | Mod: RT,,, | Performed by: ORTHOPAEDIC SURGERY

## 2021-11-13 PROCEDURE — 37000009 HC ANESTHESIA EA ADD 15 MINS: Performed by: ORTHOPAEDIC SURGERY

## 2021-11-13 PROCEDURE — 84100 ASSAY OF PHOSPHORUS: CPT | Performed by: HOSPITALIST

## 2021-11-13 PROCEDURE — 36000711: Performed by: ORTHOPAEDIC SURGERY

## 2021-11-13 PROCEDURE — 27201423 OPTIME MED/SURG SUP & DEVICES STERILE SUPPLY: Performed by: ORTHOPAEDIC SURGERY

## 2021-11-13 DEVICE — IMPLANTABLE DEVICE: Type: IMPLANTABLE DEVICE | Site: HIP | Status: FUNCTIONAL

## 2021-11-13 DEVICE — STEM ACTIS FEM COLLARED HIGH 6: Type: IMPLANTABLE DEVICE | Site: HIP | Status: FUNCTIONAL

## 2021-11-13 DEVICE — HEAD FEMORAL 28MM: Type: IMPLANTABLE DEVICE | Site: HIP | Status: FUNCTIONAL

## 2021-11-13 RX ORDER — IBUPROFEN 400 MG/1
800 TABLET ORAL 3 TIMES DAILY
Status: DISCONTINUED | OUTPATIENT
Start: 2021-11-13 | End: 2021-11-14

## 2021-11-13 RX ORDER — CEFAZOLIN SODIUM 1 G/3ML
INJECTION, POWDER, FOR SOLUTION INTRAMUSCULAR; INTRAVENOUS
Status: DISCONTINUED | OUTPATIENT
Start: 2021-11-13 | End: 2021-11-13

## 2021-11-13 RX ORDER — MIDAZOLAM HYDROCHLORIDE 1 MG/ML
INJECTION INTRAMUSCULAR; INTRAVENOUS
Status: DISCONTINUED | OUTPATIENT
Start: 2021-11-13 | End: 2021-11-13

## 2021-11-13 RX ORDER — ONDANSETRON HYDROCHLORIDE 2 MG/ML
INJECTION, SOLUTION INTRAMUSCULAR; INTRAVENOUS
Status: DISCONTINUED | OUTPATIENT
Start: 2021-11-13 | End: 2021-11-13

## 2021-11-13 RX ORDER — LIDOCAINE HCL/PF 100 MG/5ML
SYRINGE (ML) INTRAVENOUS
Status: DISCONTINUED | OUTPATIENT
Start: 2021-11-13 | End: 2021-11-13

## 2021-11-13 RX ORDER — PHENYLEPHRINE HYDROCHLORIDE 10 MG/ML
INJECTION INTRAVENOUS
Status: DISCONTINUED | OUTPATIENT
Start: 2021-11-13 | End: 2021-11-13

## 2021-11-13 RX ORDER — FENTANYL CITRATE 50 UG/ML
INJECTION, SOLUTION INTRAMUSCULAR; INTRAVENOUS
Status: DISCONTINUED | OUTPATIENT
Start: 2021-11-13 | End: 2021-11-13

## 2021-11-13 RX ORDER — ACETAMINOPHEN 10 MG/ML
INJECTION, SOLUTION INTRAVENOUS
Status: DISCONTINUED | OUTPATIENT
Start: 2021-11-13 | End: 2021-11-13

## 2021-11-13 RX ORDER — DEXAMETHASONE SODIUM PHOSPHATE 4 MG/ML
INJECTION, SOLUTION INTRA-ARTICULAR; INTRALESIONAL; INTRAMUSCULAR; INTRAVENOUS; SOFT TISSUE
Status: DISCONTINUED | OUTPATIENT
Start: 2021-11-13 | End: 2021-11-13

## 2021-11-13 RX ORDER — CEFAZOLIN SODIUM 1 G/50ML
1 SOLUTION INTRAVENOUS
Status: DISPENSED | OUTPATIENT
Start: 2021-11-13 | End: 2021-11-14

## 2021-11-13 RX ORDER — KETOROLAC TROMETHAMINE 30 MG/ML
INJECTION, SOLUTION INTRAMUSCULAR; INTRAVENOUS
Status: DISCONTINUED | OUTPATIENT
Start: 2021-11-13 | End: 2021-11-13

## 2021-11-13 RX ORDER — OXYCODONE HYDROCHLORIDE 5 MG/1
5 TABLET ORAL ONCE
Status: DISCONTINUED | OUTPATIENT
Start: 2021-11-13 | End: 2021-11-18 | Stop reason: HOSPADM

## 2021-11-13 RX ORDER — FENTANYL CITRATE 50 UG/ML
25 INJECTION, SOLUTION INTRAMUSCULAR; INTRAVENOUS EVERY 5 MIN PRN
Status: DISCONTINUED | OUTPATIENT
Start: 2021-11-13 | End: 2021-11-13

## 2021-11-13 RX ORDER — OXYCODONE HYDROCHLORIDE 5 MG/1
5 TABLET ORAL ONCE AS NEEDED
Status: COMPLETED | OUTPATIENT
Start: 2021-11-13 | End: 2021-11-13

## 2021-11-13 RX ORDER — ONDANSETRON 2 MG/ML
4 INJECTION INTRAMUSCULAR; INTRAVENOUS ONCE AS NEEDED
Status: DISCONTINUED | OUTPATIENT
Start: 2021-11-13 | End: 2021-11-18 | Stop reason: HOSPADM

## 2021-11-13 RX ORDER — OXYCODONE HYDROCHLORIDE 5 MG/1
5 TABLET ORAL ONCE
Status: COMPLETED | OUTPATIENT
Start: 2021-11-13 | End: 2021-11-13

## 2021-11-13 RX ORDER — PROPOFOL 10 MG/ML
VIAL (ML) INTRAVENOUS
Status: DISCONTINUED | OUTPATIENT
Start: 2021-11-13 | End: 2021-11-13

## 2021-11-13 RX ADMIN — KETOROLAC TROMETHAMINE 15 MG: 30 INJECTION, SOLUTION INTRAMUSCULAR; INTRAVENOUS at 02:11

## 2021-11-13 RX ADMIN — PROPOFOL 100 MG: 10 INJECTION, EMULSION INTRAVENOUS at 01:11

## 2021-11-13 RX ADMIN — IBUPROFEN 800 MG: 400 TABLET ORAL at 09:11

## 2021-11-13 RX ADMIN — GLYCOPYRROLATE 0.2 MG: 0.2 INJECTION, SOLUTION INTRAMUSCULAR; INTRAVITREAL at 12:11

## 2021-11-13 RX ADMIN — PHENYLEPHRINE HYDROCHLORIDE 200 MCG: 10 INJECTION INTRAVENOUS at 02:11

## 2021-11-13 RX ADMIN — DEXAMETHASONE SODIUM PHOSPHATE 4 MG: 4 INJECTION, SOLUTION INTRA-ARTICULAR; INTRALESIONAL; INTRAMUSCULAR; INTRAVENOUS; SOFT TISSUE at 01:11

## 2021-11-13 RX ADMIN — HYDROCODONE BITARTRATE AND ACETAMINOPHEN 1 TABLET: 5; 325 TABLET ORAL at 07:11

## 2021-11-13 RX ADMIN — ENOXAPARIN SODIUM 40 MG: 40 INJECTION SUBCUTANEOUS at 04:11

## 2021-11-13 RX ADMIN — OXYCODONE 5 MG: 5 TABLET ORAL at 03:11

## 2021-11-13 RX ADMIN — FENTANYL CITRATE 25 MCG: 50 INJECTION INTRAMUSCULAR; INTRAVENOUS at 03:11

## 2021-11-13 RX ADMIN — PHENYLEPHRINE HYDROCHLORIDE 200 MCG: 10 INJECTION INTRAVENOUS at 01:11

## 2021-11-13 RX ADMIN — FENTANYL CITRATE 25 MCG: 50 INJECTION, SOLUTION INTRAMUSCULAR; INTRAVENOUS at 01:11

## 2021-11-13 RX ADMIN — FENTANYL CITRATE 25 MCG: 50 INJECTION, SOLUTION INTRAMUSCULAR; INTRAVENOUS at 02:11

## 2021-11-13 RX ADMIN — MIDAZOLAM HYDROCHLORIDE 2 MG: 1 INJECTION, SOLUTION INTRAMUSCULAR; INTRAVENOUS at 12:11

## 2021-11-13 RX ADMIN — ONDANSETRON 4 MG: 2 INJECTION, SOLUTION INTRAMUSCULAR; INTRAVENOUS at 01:11

## 2021-11-13 RX ADMIN — PHENYLEPHRINE HYDROCHLORIDE 100 MCG: 10 INJECTION INTRAVENOUS at 02:11

## 2021-11-13 RX ADMIN — ACETAMINOPHEN 660 MG: 10 INJECTION, SOLUTION INTRAVENOUS at 01:11

## 2021-11-13 RX ADMIN — LIDOCAINE HYDROCHLORIDE 50 MG: 20 INJECTION INTRAVENOUS at 01:11

## 2021-11-13 RX ADMIN — FENTANYL CITRATE 50 MCG: 50 INJECTION, SOLUTION INTRAMUSCULAR; INTRAVENOUS at 01:11

## 2021-11-13 RX ADMIN — CEFAZOLIN SODIUM 1 G: 1 SOLUTION INTRAVENOUS at 09:11

## 2021-11-13 RX ADMIN — CEFAZOLIN 1100 MG: 1 INJECTION, POWDER, FOR SOLUTION INTRAVENOUS at 01:11

## 2021-11-14 LAB
ALBUMIN SERPL BCP-MCNC: 2.6 G/DL (ref 3.5–5.2)
ALP SERPL-CCNC: 57 U/L (ref 55–135)
ALT SERPL W/O P-5'-P-CCNC: 14 U/L (ref 10–44)
ANION GAP SERPL CALC-SCNC: 8 MMOL/L (ref 8–16)
AST SERPL-CCNC: 29 U/L (ref 10–40)
BASOPHILS # BLD AUTO: 0.03 K/UL (ref 0–0.2)
BASOPHILS NFR BLD: 0.3 % (ref 0–1.9)
BILIRUB SERPL-MCNC: 0.9 MG/DL (ref 0.1–1)
BUN SERPL-MCNC: 8 MG/DL (ref 8–23)
CALCIUM SERPL-MCNC: 8.3 MG/DL (ref 8.7–10.5)
CHLORIDE SERPL-SCNC: 101 MMOL/L (ref 95–110)
CO2 SERPL-SCNC: 24 MMOL/L (ref 23–29)
CREAT SERPL-MCNC: 0.7 MG/DL (ref 0.5–1.4)
DIFFERENTIAL METHOD: ABNORMAL
EOSINOPHIL # BLD AUTO: 0 K/UL (ref 0–0.5)
EOSINOPHIL NFR BLD: 0.1 % (ref 0–8)
ERYTHROCYTE [DISTWIDTH] IN BLOOD BY AUTOMATED COUNT: 12.2 % (ref 11.5–14.5)
EST. GFR  (AFRICAN AMERICAN): >60 ML/MIN/1.73 M^2
EST. GFR  (NON AFRICAN AMERICAN): >60 ML/MIN/1.73 M^2
GLUCOSE SERPL-MCNC: 178 MG/DL (ref 70–110)
HCT VFR BLD AUTO: 31.7 % (ref 37–48.5)
HGB BLD-MCNC: 10.4 G/DL (ref 12–16)
IMM GRANULOCYTES # BLD AUTO: 0.04 K/UL (ref 0–0.04)
IMM GRANULOCYTES NFR BLD AUTO: 0.3 % (ref 0–0.5)
LYMPHOCYTES # BLD AUTO: 1.2 K/UL (ref 1–4.8)
LYMPHOCYTES NFR BLD: 9.9 % (ref 18–48)
MAGNESIUM SERPL-MCNC: 2 MG/DL (ref 1.6–2.6)
MCH RBC QN AUTO: 30.6 PG (ref 27–31)
MCHC RBC AUTO-ENTMCNC: 32.8 G/DL (ref 32–36)
MCV RBC AUTO: 93 FL (ref 82–98)
MONOCYTES # BLD AUTO: 0.9 K/UL (ref 0.3–1)
MONOCYTES NFR BLD: 7.5 % (ref 4–15)
NEUTROPHILS # BLD AUTO: 9.7 K/UL (ref 1.8–7.7)
NEUTROPHILS NFR BLD: 81.9 % (ref 38–73)
NRBC BLD-RTO: 0 /100 WBC
PHOSPHATE SERPL-MCNC: 2.6 MG/DL (ref 2.7–4.5)
PLATELET # BLD AUTO: 258 K/UL (ref 150–450)
PMV BLD AUTO: 10.1 FL (ref 9.2–12.9)
POTASSIUM SERPL-SCNC: 4.6 MMOL/L (ref 3.5–5.1)
PROT SERPL-MCNC: 6 G/DL (ref 6–8.4)
RBC # BLD AUTO: 3.4 M/UL (ref 4–5.4)
SODIUM SERPL-SCNC: 133 MMOL/L (ref 136–145)
WBC # BLD AUTO: 11.82 K/UL (ref 3.9–12.7)

## 2021-11-14 PROCEDURE — 25000003 PHARM REV CODE 250: Performed by: HOSPITALIST

## 2021-11-14 PROCEDURE — 80053 COMPREHEN METABOLIC PANEL: CPT | Performed by: HOSPITALIST

## 2021-11-14 PROCEDURE — 36415 COLL VENOUS BLD VENIPUNCTURE: CPT | Performed by: HOSPITALIST

## 2021-11-14 PROCEDURE — 85025 COMPLETE CBC W/AUTO DIFF WBC: CPT | Performed by: HOSPITALIST

## 2021-11-14 PROCEDURE — 94761 N-INVAS EAR/PLS OXIMETRY MLT: CPT

## 2021-11-14 PROCEDURE — 94799 UNLISTED PULMONARY SVC/PX: CPT

## 2021-11-14 PROCEDURE — 25000003 PHARM REV CODE 250: Performed by: ORTHOPAEDIC SURGERY

## 2021-11-14 PROCEDURE — 99900035 HC TECH TIME PER 15 MIN (STAT)

## 2021-11-14 PROCEDURE — 12000002 HC ACUTE/MED SURGE SEMI-PRIVATE ROOM

## 2021-11-14 PROCEDURE — 97162 PT EVAL MOD COMPLEX 30 MIN: CPT

## 2021-11-14 PROCEDURE — 84100 ASSAY OF PHOSPHORUS: CPT | Performed by: HOSPITALIST

## 2021-11-14 PROCEDURE — 97116 GAIT TRAINING THERAPY: CPT

## 2021-11-14 PROCEDURE — 83735 ASSAY OF MAGNESIUM: CPT | Performed by: HOSPITALIST

## 2021-11-14 PROCEDURE — 25000003 PHARM REV CODE 250: Performed by: NURSE PRACTITIONER

## 2021-11-14 PROCEDURE — 63600175 PHARM REV CODE 636 W HCPCS: Performed by: ORTHOPAEDIC SURGERY

## 2021-11-14 PROCEDURE — 63600175 PHARM REV CODE 636 W HCPCS: Performed by: HOSPITALIST

## 2021-11-14 RX ORDER — BISACODYL 10 MG
10 SUPPOSITORY, RECTAL RECTAL DAILY PRN
Status: DISCONTINUED | OUTPATIENT
Start: 2021-11-14 | End: 2021-11-18 | Stop reason: HOSPADM

## 2021-11-14 RX ORDER — IBUPROFEN 400 MG/1
800 TABLET ORAL EVERY 6 HOURS PRN
Status: DISCONTINUED | OUTPATIENT
Start: 2021-11-14 | End: 2021-11-18 | Stop reason: HOSPADM

## 2021-11-14 RX ORDER — POLYETHYLENE GLYCOL 3350 17 G/17G
17 POWDER, FOR SOLUTION ORAL 2 TIMES DAILY
Status: DISCONTINUED | OUTPATIENT
Start: 2021-11-14 | End: 2021-11-18 | Stop reason: HOSPADM

## 2021-11-14 RX ORDER — CYPROHEPTADINE HYDROCHLORIDE 4 MG/1
0.12 TABLET ORAL
COMMUNITY
Start: 2021-03-30

## 2021-11-14 RX ADMIN — ACETAMINOPHEN 650 MG: 325 TABLET ORAL at 03:11

## 2021-11-14 RX ADMIN — IBUPROFEN 800 MG: 400 TABLET ORAL at 09:11

## 2021-11-14 RX ADMIN — HYDROCODONE BITARTRATE AND ACETAMINOPHEN 1 TABLET: 10; 325 TABLET ORAL at 12:11

## 2021-11-14 RX ADMIN — HYDROCODONE BITARTRATE AND ACETAMINOPHEN 1 TABLET: 5; 325 TABLET ORAL at 03:11

## 2021-11-14 RX ADMIN — HYDROCODONE BITARTRATE AND ACETAMINOPHEN 1 TABLET: 5; 325 TABLET ORAL at 08:11

## 2021-11-14 RX ADMIN — ENOXAPARIN SODIUM 40 MG: 40 INJECTION SUBCUTANEOUS at 05:11

## 2021-11-14 RX ADMIN — ALPRAZOLAM 0.5 MG: 0.25 TABLET ORAL at 08:11

## 2021-11-14 RX ADMIN — CEFAZOLIN SODIUM 1 G: 1 SOLUTION INTRAVENOUS at 09:11

## 2021-11-15 PROBLEM — D62 ACUTE POSTOPERATIVE ANEMIA DUE TO EXPECTED BLOOD LOSS: Status: ACTIVE | Noted: 2021-11-15

## 2021-11-15 LAB
ALBUMIN SERPL BCP-MCNC: 2.2 G/DL (ref 3.5–5.2)
ALP SERPL-CCNC: 53 U/L (ref 55–135)
ALT SERPL W/O P-5'-P-CCNC: 14 U/L (ref 10–44)
ANION GAP SERPL CALC-SCNC: 7 MMOL/L (ref 8–16)
AST SERPL-CCNC: 33 U/L (ref 10–40)
BASOPHILS # BLD AUTO: 0.04 K/UL (ref 0–0.2)
BASOPHILS NFR BLD: 0.5 % (ref 0–1.9)
BILIRUB SERPL-MCNC: 0.8 MG/DL (ref 0.1–1)
BUN SERPL-MCNC: 6 MG/DL (ref 8–23)
CALCIUM SERPL-MCNC: 7.8 MG/DL (ref 8.7–10.5)
CHLORIDE SERPL-SCNC: 102 MMOL/L (ref 95–110)
CO2 SERPL-SCNC: 24 MMOL/L (ref 23–29)
CREAT SERPL-MCNC: 0.6 MG/DL (ref 0.5–1.4)
DIFFERENTIAL METHOD: ABNORMAL
EOSINOPHIL # BLD AUTO: 0.1 K/UL (ref 0–0.5)
EOSINOPHIL NFR BLD: 1.7 % (ref 0–8)
ERYTHROCYTE [DISTWIDTH] IN BLOOD BY AUTOMATED COUNT: 12.3 % (ref 11.5–14.5)
EST. GFR  (AFRICAN AMERICAN): >60 ML/MIN/1.73 M^2
EST. GFR  (NON AFRICAN AMERICAN): >60 ML/MIN/1.73 M^2
GLUCOSE SERPL-MCNC: 116 MG/DL (ref 70–110)
HCT VFR BLD AUTO: 22 % (ref 37–48.5)
HGB BLD-MCNC: 7.5 G/DL (ref 12–16)
IMM GRANULOCYTES # BLD AUTO: 0.01 K/UL (ref 0–0.04)
IMM GRANULOCYTES NFR BLD AUTO: 0.1 % (ref 0–0.5)
LYMPHOCYTES # BLD AUTO: 1.6 K/UL (ref 1–4.8)
LYMPHOCYTES NFR BLD: 21.5 % (ref 18–48)
MAGNESIUM SERPL-MCNC: 1.8 MG/DL (ref 1.6–2.6)
MCH RBC QN AUTO: 31.1 PG (ref 27–31)
MCHC RBC AUTO-ENTMCNC: 34.1 G/DL (ref 32–36)
MCV RBC AUTO: 91 FL (ref 82–98)
MONOCYTES # BLD AUTO: 0.9 K/UL (ref 0.3–1)
MONOCYTES NFR BLD: 11.5 % (ref 4–15)
NEUTROPHILS # BLD AUTO: 4.9 K/UL (ref 1.8–7.7)
NEUTROPHILS NFR BLD: 64.7 % (ref 38–73)
NRBC BLD-RTO: 0 /100 WBC
PHOSPHATE SERPL-MCNC: 2.2 MG/DL (ref 2.7–4.5)
PLATELET # BLD AUTO: 188 K/UL (ref 150–450)
PMV BLD AUTO: 10.1 FL (ref 9.2–12.9)
POTASSIUM SERPL-SCNC: 4.1 MMOL/L (ref 3.5–5.1)
PROT SERPL-MCNC: 5.2 G/DL (ref 6–8.4)
RBC # BLD AUTO: 2.41 M/UL (ref 4–5.4)
SODIUM SERPL-SCNC: 133 MMOL/L (ref 136–145)
WBC # BLD AUTO: 7.55 K/UL (ref 3.9–12.7)

## 2021-11-15 PROCEDURE — 25000003 PHARM REV CODE 250: Performed by: HOSPITALIST

## 2021-11-15 PROCEDURE — 94761 N-INVAS EAR/PLS OXIMETRY MLT: CPT

## 2021-11-15 PROCEDURE — 99900035 HC TECH TIME PER 15 MIN (STAT)

## 2021-11-15 PROCEDURE — 84100 ASSAY OF PHOSPHORUS: CPT | Performed by: HOSPITALIST

## 2021-11-15 PROCEDURE — 97535 SELF CARE MNGMENT TRAINING: CPT

## 2021-11-15 PROCEDURE — 97530 THERAPEUTIC ACTIVITIES: CPT

## 2021-11-15 PROCEDURE — 25000003 PHARM REV CODE 250: Performed by: INTERNAL MEDICINE

## 2021-11-15 PROCEDURE — 36415 COLL VENOUS BLD VENIPUNCTURE: CPT | Performed by: HOSPITALIST

## 2021-11-15 PROCEDURE — 63600175 PHARM REV CODE 636 W HCPCS: Performed by: HOSPITALIST

## 2021-11-15 PROCEDURE — 25000003 PHARM REV CODE 250: Performed by: NURSE PRACTITIONER

## 2021-11-15 PROCEDURE — 97165 OT EVAL LOW COMPLEX 30 MIN: CPT

## 2021-11-15 PROCEDURE — 94799 UNLISTED PULMONARY SVC/PX: CPT

## 2021-11-15 PROCEDURE — 80053 COMPREHEN METABOLIC PANEL: CPT | Performed by: HOSPITALIST

## 2021-11-15 PROCEDURE — 97530 THERAPEUTIC ACTIVITIES: CPT | Mod: CQ

## 2021-11-15 PROCEDURE — 85025 COMPLETE CBC W/AUTO DIFF WBC: CPT | Performed by: HOSPITALIST

## 2021-11-15 PROCEDURE — 97116 GAIT TRAINING THERAPY: CPT | Mod: CQ

## 2021-11-15 PROCEDURE — 83735 ASSAY OF MAGNESIUM: CPT | Performed by: HOSPITALIST

## 2021-11-15 PROCEDURE — 12000002 HC ACUTE/MED SURGE SEMI-PRIVATE ROOM

## 2021-11-15 RX ORDER — HYOSCYAMINE SULFATE 0.12 MG/1
0.12 TABLET SUBLINGUAL EVERY 4 HOURS PRN
COMMUNITY

## 2021-11-15 RX ORDER — HYOSCYAMINE SULFATE 0.12 MG/1
0.12 TABLET SUBLINGUAL
Status: DISCONTINUED | OUTPATIENT
Start: 2021-11-15 | End: 2021-11-18 | Stop reason: HOSPADM

## 2021-11-15 RX ADMIN — HYDROCODONE BITARTRATE AND ACETAMINOPHEN 1 TABLET: 10; 325 TABLET ORAL at 08:11

## 2021-11-15 RX ADMIN — HYDROCODONE BITARTRATE AND ACETAMINOPHEN 1 TABLET: 10; 325 TABLET ORAL at 03:11

## 2021-11-15 RX ADMIN — ACETAMINOPHEN 650 MG: 325 TABLET ORAL at 08:11

## 2021-11-15 RX ADMIN — ALPRAZOLAM 0.5 MG: 0.25 TABLET ORAL at 01:11

## 2021-11-15 RX ADMIN — HYOSCYAMINE SULFATE 0.12 MG: 0.12 TABLET ORAL; SUBLINGUAL at 04:11

## 2021-11-15 RX ADMIN — MELATONIN TAB 3 MG 6 MG: 3 TAB at 08:11

## 2021-11-15 RX ADMIN — ENOXAPARIN SODIUM 40 MG: 40 INJECTION SUBCUTANEOUS at 04:11

## 2021-11-15 RX ADMIN — POLYETHYLENE GLYCOL 3350 17 G: 17 POWDER, FOR SOLUTION ORAL at 08:11

## 2021-11-15 RX ADMIN — HYDROCODONE BITARTRATE AND ACETAMINOPHEN 1 TABLET: 10; 325 TABLET ORAL at 09:11

## 2021-11-16 PROBLEM — R50.9 FEVER: Status: ACTIVE | Noted: 2021-11-16

## 2021-11-16 LAB
ALBUMIN SERPL BCP-MCNC: 2.2 G/DL (ref 3.5–5.2)
ALP SERPL-CCNC: 65 U/L (ref 55–135)
ALT SERPL W/O P-5'-P-CCNC: 16 U/L (ref 10–44)
ANION GAP SERPL CALC-SCNC: 7 MMOL/L (ref 8–16)
AST SERPL-CCNC: 32 U/L (ref 10–40)
BASOPHILS # BLD AUTO: 0.06 K/UL (ref 0–0.2)
BASOPHILS NFR BLD: 0.9 % (ref 0–1.9)
BILIRUB SERPL-MCNC: 0.9 MG/DL (ref 0.1–1)
BILIRUB UR QL STRIP: NEGATIVE
BUN SERPL-MCNC: 6 MG/DL (ref 8–23)
CALCIUM SERPL-MCNC: 8.2 MG/DL (ref 8.7–10.5)
CHLORIDE SERPL-SCNC: 101 MMOL/L (ref 95–110)
CLARITY UR: CLEAR
CO2 SERPL-SCNC: 26 MMOL/L (ref 23–29)
COLOR UR: YELLOW
CREAT SERPL-MCNC: 0.6 MG/DL (ref 0.5–1.4)
DIFFERENTIAL METHOD: ABNORMAL
EOSINOPHIL # BLD AUTO: 0.2 K/UL (ref 0–0.5)
EOSINOPHIL NFR BLD: 2.2 % (ref 0–8)
ERYTHROCYTE [DISTWIDTH] IN BLOOD BY AUTOMATED COUNT: 12.5 % (ref 11.5–14.5)
EST. GFR  (AFRICAN AMERICAN): >60 ML/MIN/1.73 M^2
EST. GFR  (NON AFRICAN AMERICAN): >60 ML/MIN/1.73 M^2
GLUCOSE SERPL-MCNC: 105 MG/DL (ref 70–110)
GLUCOSE UR QL STRIP: NEGATIVE
HCT VFR BLD AUTO: 22.6 % (ref 37–48.5)
HGB BLD-MCNC: 7.3 G/DL (ref 12–16)
HGB UR QL STRIP: NEGATIVE
IMM GRANULOCYTES # BLD AUTO: 0.02 K/UL (ref 0–0.04)
IMM GRANULOCYTES NFR BLD AUTO: 0.3 % (ref 0–0.5)
KETONES UR QL STRIP: NEGATIVE
LACTATE SERPL-SCNC: 1.9 MMOL/L (ref 0.5–2.2)
LEUKOCYTE ESTERASE UR QL STRIP: NEGATIVE
LYMPHOCYTES # BLD AUTO: 1.9 K/UL (ref 1–4.8)
LYMPHOCYTES NFR BLD: 27.7 % (ref 18–48)
MAGNESIUM SERPL-MCNC: 1.9 MG/DL (ref 1.6–2.6)
MCH RBC QN AUTO: 30.4 PG (ref 27–31)
MCHC RBC AUTO-ENTMCNC: 32.3 G/DL (ref 32–36)
MCV RBC AUTO: 94 FL (ref 82–98)
MONOCYTES # BLD AUTO: 0.7 K/UL (ref 0.3–1)
MONOCYTES NFR BLD: 11 % (ref 4–15)
NEUTROPHILS # BLD AUTO: 3.9 K/UL (ref 1.8–7.7)
NEUTROPHILS NFR BLD: 57.9 % (ref 38–73)
NITRITE UR QL STRIP: NEGATIVE
NRBC BLD-RTO: 0 /100 WBC
PH UR STRIP: 7 [PH] (ref 5–8)
PHOSPHATE SERPL-MCNC: 2.8 MG/DL (ref 2.7–4.5)
PLATELET # BLD AUTO: 202 K/UL (ref 150–450)
PMV BLD AUTO: 10.4 FL (ref 9.2–12.9)
POTASSIUM SERPL-SCNC: 4.5 MMOL/L (ref 3.5–5.1)
PROCALCITONIN SERPL IA-MCNC: 0.1 NG/ML
PROT SERPL-MCNC: 5.4 G/DL (ref 6–8.4)
PROT UR QL STRIP: NEGATIVE
RBC # BLD AUTO: 2.4 M/UL (ref 4–5.4)
SODIUM SERPL-SCNC: 134 MMOL/L (ref 136–145)
SP GR UR STRIP: 1.02 (ref 1–1.03)
URN SPEC COLLECT METH UR: NORMAL
UROBILINOGEN UR STRIP-ACNC: 1 EU/DL
WBC # BLD AUTO: 6.72 K/UL (ref 3.9–12.7)

## 2021-11-16 PROCEDURE — 80053 COMPREHEN METABOLIC PANEL: CPT | Performed by: HOSPITALIST

## 2021-11-16 PROCEDURE — 84100 ASSAY OF PHOSPHORUS: CPT | Performed by: HOSPITALIST

## 2021-11-16 PROCEDURE — 12000002 HC ACUTE/MED SURGE SEMI-PRIVATE ROOM

## 2021-11-16 PROCEDURE — 94799 UNLISTED PULMONARY SVC/PX: CPT

## 2021-11-16 PROCEDURE — 84145 PROCALCITONIN (PCT): CPT | Performed by: HOSPITALIST

## 2021-11-16 PROCEDURE — 36410 VNPNXR 3YR/> PHY/QHP DX/THER: CPT

## 2021-11-16 PROCEDURE — 85025 COMPLETE CBC W/AUTO DIFF WBC: CPT | Performed by: HOSPITALIST

## 2021-11-16 PROCEDURE — 99900035 HC TECH TIME PER 15 MIN (STAT)

## 2021-11-16 PROCEDURE — 63600175 PHARM REV CODE 636 W HCPCS: Performed by: HOSPITALIST

## 2021-11-16 PROCEDURE — 97535 SELF CARE MNGMENT TRAINING: CPT | Mod: CO

## 2021-11-16 PROCEDURE — 83605 ASSAY OF LACTIC ACID: CPT | Performed by: HOSPITALIST

## 2021-11-16 PROCEDURE — 36415 COLL VENOUS BLD VENIPUNCTURE: CPT | Performed by: HOSPITALIST

## 2021-11-16 PROCEDURE — 94761 N-INVAS EAR/PLS OXIMETRY MLT: CPT

## 2021-11-16 PROCEDURE — 97116 GAIT TRAINING THERAPY: CPT | Mod: CQ

## 2021-11-16 PROCEDURE — 83735 ASSAY OF MAGNESIUM: CPT | Performed by: HOSPITALIST

## 2021-11-16 PROCEDURE — 87040 BLOOD CULTURE FOR BACTERIA: CPT | Mod: 59 | Performed by: HOSPITALIST

## 2021-11-16 PROCEDURE — 25000003 PHARM REV CODE 250: Performed by: NURSE PRACTITIONER

## 2021-11-16 PROCEDURE — 25000003 PHARM REV CODE 250: Performed by: INTERNAL MEDICINE

## 2021-11-16 PROCEDURE — 25000003 PHARM REV CODE 250: Performed by: HOSPITALIST

## 2021-11-16 PROCEDURE — 81003 URINALYSIS AUTO W/O SCOPE: CPT | Performed by: HOSPITALIST

## 2021-11-16 PROCEDURE — 97530 THERAPEUTIC ACTIVITIES: CPT | Mod: CQ

## 2021-11-16 RX ADMIN — POLYETHYLENE GLYCOL 3350 17 G: 17 POWDER, FOR SOLUTION ORAL at 09:11

## 2021-11-16 RX ADMIN — PIPERACILLIN AND TAZOBACTAM 4.5 G: 4; .5 INJECTION, POWDER, LYOPHILIZED, FOR SOLUTION INTRAVENOUS; PARENTERAL at 09:11

## 2021-11-16 RX ADMIN — HYDROCODONE BITARTRATE AND ACETAMINOPHEN 1 TABLET: 10; 325 TABLET ORAL at 09:11

## 2021-11-16 RX ADMIN — HYOSCYAMINE SULFATE 0.12 MG: 0.12 TABLET ORAL; SUBLINGUAL at 12:11

## 2021-11-16 RX ADMIN — HYOSCYAMINE SULFATE 0.12 MG: 0.12 TABLET ORAL; SUBLINGUAL at 04:11

## 2021-11-16 RX ADMIN — PIPERACILLIN AND TAZOBACTAM 4.5 G: 4; .5 INJECTION, POWDER, LYOPHILIZED, FOR SOLUTION INTRAVENOUS; PARENTERAL at 04:11

## 2021-11-16 RX ADMIN — ALPRAZOLAM 0.5 MG: 0.25 TABLET ORAL at 09:11

## 2021-11-16 RX ADMIN — ALPRAZOLAM 0.5 MG: 0.25 TABLET ORAL at 12:11

## 2021-11-16 RX ADMIN — HYOSCYAMINE SULFATE 0.12 MG: 0.12 TABLET ORAL; SUBLINGUAL at 06:11

## 2021-11-16 RX ADMIN — HYDROCODONE BITARTRATE AND ACETAMINOPHEN 1 TABLET: 10; 325 TABLET ORAL at 12:11

## 2021-11-16 RX ADMIN — MELATONIN TAB 3 MG 6 MG: 3 TAB at 09:11

## 2021-11-16 RX ADMIN — HYDROCODONE BITARTRATE AND ACETAMINOPHEN 1 TABLET: 5; 325 TABLET ORAL at 06:11

## 2021-11-16 RX ADMIN — VANCOMYCIN HYDROCHLORIDE 750 MG: 750 INJECTION, POWDER, LYOPHILIZED, FOR SOLUTION INTRAVENOUS at 09:11

## 2021-11-16 RX ADMIN — ENOXAPARIN SODIUM 40 MG: 40 INJECTION SUBCUTANEOUS at 04:11

## 2021-11-17 LAB
ALBUMIN SERPL BCP-MCNC: 2 G/DL (ref 3.5–5.2)
ALP SERPL-CCNC: 63 U/L (ref 55–135)
ALT SERPL W/O P-5'-P-CCNC: 14 U/L (ref 10–44)
ANION GAP SERPL CALC-SCNC: 7 MMOL/L (ref 8–16)
AST SERPL-CCNC: 28 U/L (ref 10–40)
BASOPHILS # BLD AUTO: 0.04 K/UL (ref 0–0.2)
BASOPHILS NFR BLD: 0.6 % (ref 0–1.9)
BILIRUB SERPL-MCNC: 1 MG/DL (ref 0.1–1)
BUN SERPL-MCNC: 8 MG/DL (ref 8–23)
CALCIUM SERPL-MCNC: 8.1 MG/DL (ref 8.7–10.5)
CHLORIDE SERPL-SCNC: 100 MMOL/L (ref 95–110)
CO2 SERPL-SCNC: 27 MMOL/L (ref 23–29)
CREAT SERPL-MCNC: 0.7 MG/DL (ref 0.5–1.4)
DIFFERENTIAL METHOD: ABNORMAL
EOSINOPHIL # BLD AUTO: 0.3 K/UL (ref 0–0.5)
EOSINOPHIL NFR BLD: 4.6 % (ref 0–8)
ERYTHROCYTE [DISTWIDTH] IN BLOOD BY AUTOMATED COUNT: 12.8 % (ref 11.5–14.5)
EST. GFR  (AFRICAN AMERICAN): >60 ML/MIN/1.73 M^2
EST. GFR  (NON AFRICAN AMERICAN): >60 ML/MIN/1.73 M^2
GLUCOSE SERPL-MCNC: 113 MG/DL (ref 70–110)
HCT VFR BLD AUTO: 21.9 % (ref 37–48.5)
HGB BLD-MCNC: 7.2 G/DL (ref 12–16)
IMM GRANULOCYTES # BLD AUTO: 0.03 K/UL (ref 0–0.04)
IMM GRANULOCYTES NFR BLD AUTO: 0.5 % (ref 0–0.5)
LYMPHOCYTES # BLD AUTO: 1.6 K/UL (ref 1–4.8)
LYMPHOCYTES NFR BLD: 24.7 % (ref 18–48)
MAGNESIUM SERPL-MCNC: 1.9 MG/DL (ref 1.6–2.6)
MCH RBC QN AUTO: 30.8 PG (ref 27–31)
MCHC RBC AUTO-ENTMCNC: 32.9 G/DL (ref 32–36)
MCV RBC AUTO: 94 FL (ref 82–98)
MONOCYTES # BLD AUTO: 0.7 K/UL (ref 0.3–1)
MONOCYTES NFR BLD: 11 % (ref 4–15)
NEUTROPHILS # BLD AUTO: 3.7 K/UL (ref 1.8–7.7)
NEUTROPHILS NFR BLD: 58.6 % (ref 38–73)
NRBC BLD-RTO: 0 /100 WBC
PHOSPHATE SERPL-MCNC: 3.2 MG/DL (ref 2.7–4.5)
PLATELET # BLD AUTO: 235 K/UL (ref 150–450)
PMV BLD AUTO: 10.3 FL (ref 9.2–12.9)
POTASSIUM SERPL-SCNC: 4.3 MMOL/L (ref 3.5–5.1)
PROT SERPL-MCNC: 5.4 G/DL (ref 6–8.4)
RBC # BLD AUTO: 2.34 M/UL (ref 4–5.4)
SODIUM SERPL-SCNC: 134 MMOL/L (ref 136–145)
WBC # BLD AUTO: 6.35 K/UL (ref 3.9–12.7)

## 2021-11-17 PROCEDURE — 97535 SELF CARE MNGMENT TRAINING: CPT | Mod: CO

## 2021-11-17 PROCEDURE — 25000003 PHARM REV CODE 250: Performed by: INTERNAL MEDICINE

## 2021-11-17 PROCEDURE — 25000003 PHARM REV CODE 250: Performed by: NURSE PRACTITIONER

## 2021-11-17 PROCEDURE — 94761 N-INVAS EAR/PLS OXIMETRY MLT: CPT

## 2021-11-17 PROCEDURE — 97530 THERAPEUTIC ACTIVITIES: CPT | Mod: CQ

## 2021-11-17 PROCEDURE — 36415 COLL VENOUS BLD VENIPUNCTURE: CPT | Performed by: HOSPITALIST

## 2021-11-17 PROCEDURE — 99900035 HC TECH TIME PER 15 MIN (STAT)

## 2021-11-17 PROCEDURE — 25000003 PHARM REV CODE 250: Performed by: HOSPITALIST

## 2021-11-17 PROCEDURE — 84100 ASSAY OF PHOSPHORUS: CPT | Performed by: HOSPITALIST

## 2021-11-17 PROCEDURE — 85025 COMPLETE CBC W/AUTO DIFF WBC: CPT | Performed by: HOSPITALIST

## 2021-11-17 PROCEDURE — 94799 UNLISTED PULMONARY SVC/PX: CPT

## 2021-11-17 PROCEDURE — 12000002 HC ACUTE/MED SURGE SEMI-PRIVATE ROOM

## 2021-11-17 PROCEDURE — 80053 COMPREHEN METABOLIC PANEL: CPT | Performed by: HOSPITALIST

## 2021-11-17 PROCEDURE — 63600175 PHARM REV CODE 636 W HCPCS: Performed by: HOSPITALIST

## 2021-11-17 PROCEDURE — 83735 ASSAY OF MAGNESIUM: CPT | Performed by: HOSPITALIST

## 2021-11-17 PROCEDURE — 97116 GAIT TRAINING THERAPY: CPT | Mod: CQ

## 2021-11-17 RX ORDER — MUPIROCIN 20 MG/G
OINTMENT TOPICAL 2 TIMES DAILY
Status: DISCONTINUED | OUTPATIENT
Start: 2021-11-17 | End: 2021-11-18 | Stop reason: HOSPADM

## 2021-11-17 RX ADMIN — PIPERACILLIN AND TAZOBACTAM 4.5 G: 4; .5 INJECTION, POWDER, LYOPHILIZED, FOR SOLUTION INTRAVENOUS; PARENTERAL at 11:11

## 2021-11-17 RX ADMIN — PIPERACILLIN AND TAZOBACTAM 4.5 G: 4; .5 INJECTION, POWDER, LYOPHILIZED, FOR SOLUTION INTRAVENOUS; PARENTERAL at 04:11

## 2021-11-17 RX ADMIN — ENOXAPARIN SODIUM 40 MG: 40 INJECTION SUBCUTANEOUS at 04:11

## 2021-11-17 RX ADMIN — MELATONIN TAB 3 MG 6 MG: 3 TAB at 09:11

## 2021-11-17 RX ADMIN — HYOSCYAMINE SULFATE 0.12 MG: 0.12 TABLET ORAL; SUBLINGUAL at 06:11

## 2021-11-17 RX ADMIN — ALPRAZOLAM 0.5 MG: 0.25 TABLET ORAL at 04:11

## 2021-11-17 RX ADMIN — POLYETHYLENE GLYCOL 3350 17 G: 17 POWDER, FOR SOLUTION ORAL at 09:11

## 2021-11-17 RX ADMIN — HYDROCODONE BITARTRATE AND ACETAMINOPHEN 1 TABLET: 10; 325 TABLET ORAL at 09:11

## 2021-11-17 RX ADMIN — VANCOMYCIN HYDROCHLORIDE 750 MG: 750 INJECTION, POWDER, LYOPHILIZED, FOR SOLUTION INTRAVENOUS at 09:11

## 2021-11-17 RX ADMIN — HYOSCYAMINE SULFATE 0.12 MG: 0.12 TABLET ORAL; SUBLINGUAL at 11:11

## 2021-11-17 RX ADMIN — HYDROCODONE BITARTRATE AND ACETAMINOPHEN 1 TABLET: 10; 325 TABLET ORAL at 11:11

## 2021-11-17 RX ADMIN — PIPERACILLIN AND TAZOBACTAM 4.5 G: 4; .5 INJECTION, POWDER, LYOPHILIZED, FOR SOLUTION INTRAVENOUS; PARENTERAL at 01:11

## 2021-11-17 RX ADMIN — MUPIROCIN: 20 OINTMENT TOPICAL at 09:11

## 2021-11-17 RX ADMIN — HYOSCYAMINE SULFATE 0.12 MG: 0.12 TABLET ORAL; SUBLINGUAL at 04:11

## 2021-11-18 VITALS
DIASTOLIC BLOOD PRESSURE: 58 MMHG | TEMPERATURE: 99 F | OXYGEN SATURATION: 98 % | HEART RATE: 110 BPM | WEIGHT: 97 LBS | RESPIRATION RATE: 18 BRPM | BODY MASS INDEX: 19.04 KG/M2 | HEIGHT: 60 IN | SYSTOLIC BLOOD PRESSURE: 126 MMHG

## 2021-11-18 LAB
ABO + RH BLD: NORMAL
ALBUMIN SERPL BCP-MCNC: 2 G/DL (ref 3.5–5.2)
ALP SERPL-CCNC: 65 U/L (ref 55–135)
ALT SERPL W/O P-5'-P-CCNC: 15 U/L (ref 10–44)
ANION GAP SERPL CALC-SCNC: 7 MMOL/L (ref 8–16)
AST SERPL-CCNC: 26 U/L (ref 10–40)
BASOPHILS # BLD AUTO: 0.04 K/UL (ref 0–0.2)
BASOPHILS NFR BLD: 0.7 % (ref 0–1.9)
BILIRUB SERPL-MCNC: 1.2 MG/DL (ref 0.1–1)
BLD GP AB SCN CELLS X3 SERPL QL: NORMAL
BLD PROD TYP BPU: NORMAL
BLOOD UNIT EXPIRATION DATE: NORMAL
BLOOD UNIT TYPE CODE: 5100
BLOOD UNIT TYPE: NORMAL
BUN SERPL-MCNC: 7 MG/DL (ref 8–23)
CALCIUM SERPL-MCNC: 8.2 MG/DL (ref 8.7–10.5)
CHLORIDE SERPL-SCNC: 100 MMOL/L (ref 95–110)
CO2 SERPL-SCNC: 28 MMOL/L (ref 23–29)
CODING SYSTEM: NORMAL
CREAT SERPL-MCNC: 0.7 MG/DL (ref 0.5–1.4)
DIFFERENTIAL METHOD: ABNORMAL
DISPENSE STATUS: NORMAL
EOSINOPHIL # BLD AUTO: 0.4 K/UL (ref 0–0.5)
EOSINOPHIL NFR BLD: 7 % (ref 0–8)
ERYTHROCYTE [DISTWIDTH] IN BLOOD BY AUTOMATED COUNT: 12.7 % (ref 11.5–14.5)
EST. GFR  (AFRICAN AMERICAN): >60 ML/MIN/1.73 M^2
EST. GFR  (NON AFRICAN AMERICAN): >60 ML/MIN/1.73 M^2
GLUCOSE SERPL-MCNC: 112 MG/DL (ref 70–110)
HCT VFR BLD AUTO: 20.3 % (ref 37–48.5)
HGB BLD-MCNC: 6.7 G/DL (ref 12–16)
IMM GRANULOCYTES # BLD AUTO: 0.02 K/UL (ref 0–0.04)
IMM GRANULOCYTES NFR BLD AUTO: 0.4 % (ref 0–0.5)
LYMPHOCYTES # BLD AUTO: 1.6 K/UL (ref 1–4.8)
LYMPHOCYTES NFR BLD: 28 % (ref 18–48)
MAGNESIUM SERPL-MCNC: 1.9 MG/DL (ref 1.6–2.6)
MCH RBC QN AUTO: 30.5 PG (ref 27–31)
MCHC RBC AUTO-ENTMCNC: 33 G/DL (ref 32–36)
MCV RBC AUTO: 92 FL (ref 82–98)
MONOCYTES # BLD AUTO: 0.6 K/UL (ref 0.3–1)
MONOCYTES NFR BLD: 11.4 % (ref 4–15)
NEUTROPHILS # BLD AUTO: 3 K/UL (ref 1.8–7.7)
NEUTROPHILS NFR BLD: 52.5 % (ref 38–73)
NRBC BLD-RTO: 0 /100 WBC
NUM UNITS TRANS PACKED RBC: NORMAL
PHOSPHATE SERPL-MCNC: 3.5 MG/DL (ref 2.7–4.5)
PLATELET # BLD AUTO: 280 K/UL (ref 150–450)
PMV BLD AUTO: 10.1 FL (ref 9.2–12.9)
POTASSIUM SERPL-SCNC: 3.8 MMOL/L (ref 3.5–5.1)
PROT SERPL-MCNC: 5.5 G/DL (ref 6–8.4)
RBC # BLD AUTO: 2.2 M/UL (ref 4–5.4)
SODIUM SERPL-SCNC: 135 MMOL/L (ref 136–145)
VANCOMYCIN TROUGH SERPL-MCNC: 4.1 UG/ML (ref 10–22)
WBC # BLD AUTO: 5.61 K/UL (ref 3.9–12.7)

## 2021-11-18 PROCEDURE — 25000003 PHARM REV CODE 250: Performed by: NURSE PRACTITIONER

## 2021-11-18 PROCEDURE — 97116 GAIT TRAINING THERAPY: CPT | Mod: CQ

## 2021-11-18 PROCEDURE — 80202 ASSAY OF VANCOMYCIN: CPT | Performed by: HOSPITALIST

## 2021-11-18 PROCEDURE — 84100 ASSAY OF PHOSPHORUS: CPT | Performed by: HOSPITALIST

## 2021-11-18 PROCEDURE — 94761 N-INVAS EAR/PLS OXIMETRY MLT: CPT

## 2021-11-18 PROCEDURE — 85025 COMPLETE CBC W/AUTO DIFF WBC: CPT | Performed by: HOSPITALIST

## 2021-11-18 PROCEDURE — 63600175 PHARM REV CODE 636 W HCPCS: Performed by: HOSPITALIST

## 2021-11-18 PROCEDURE — P9016 RBC LEUKOCYTES REDUCED: HCPCS | Performed by: HOSPITALIST

## 2021-11-18 PROCEDURE — 25000003 PHARM REV CODE 250: Performed by: HOSPITALIST

## 2021-11-18 PROCEDURE — 83735 ASSAY OF MAGNESIUM: CPT | Performed by: HOSPITALIST

## 2021-11-18 PROCEDURE — 36415 COLL VENOUS BLD VENIPUNCTURE: CPT | Performed by: HOSPITALIST

## 2021-11-18 PROCEDURE — 99900035 HC TECH TIME PER 15 MIN (STAT)

## 2021-11-18 PROCEDURE — 80053 COMPREHEN METABOLIC PANEL: CPT | Performed by: HOSPITALIST

## 2021-11-18 PROCEDURE — 86900 BLOOD TYPING SEROLOGIC ABO: CPT | Performed by: HOSPITALIST

## 2021-11-18 PROCEDURE — A4216 STERILE WATER/SALINE, 10 ML: HCPCS | Performed by: HOSPITALIST

## 2021-11-18 PROCEDURE — 36430 TRANSFUSION BLD/BLD COMPNT: CPT

## 2021-11-18 PROCEDURE — 94799 UNLISTED PULMONARY SVC/PX: CPT

## 2021-11-18 PROCEDURE — 86920 COMPATIBILITY TEST SPIN: CPT | Performed by: HOSPITALIST

## 2021-11-18 RX ORDER — HYDROCODONE BITARTRATE AND ACETAMINOPHEN 10; 325 MG/1; MG/1
1 TABLET ORAL EVERY 6 HOURS PRN
Qty: 20 TABLET | Refills: 0 | Status: SHIPPED | OUTPATIENT
Start: 2021-11-18 | End: 2021-11-26

## 2021-11-18 RX ORDER — HYDROCODONE BITARTRATE AND ACETAMINOPHEN 500; 5 MG/1; MG/1
TABLET ORAL
Status: DISCONTINUED | OUTPATIENT
Start: 2021-11-18 | End: 2021-11-18 | Stop reason: HOSPADM

## 2021-11-18 RX ADMIN — PIPERACILLIN AND TAZOBACTAM 4.5 G: 4; .5 INJECTION, POWDER, LYOPHILIZED, FOR SOLUTION INTRAVENOUS; PARENTERAL at 02:11

## 2021-11-18 RX ADMIN — MUPIROCIN: 20 OINTMENT TOPICAL at 08:11

## 2021-11-18 RX ADMIN — HYDROCODONE BITARTRATE AND ACETAMINOPHEN 1 TABLET: 5; 325 TABLET ORAL at 03:11

## 2021-11-18 RX ADMIN — HYOSCYAMINE SULFATE 0.12 MG: 0.12 TABLET ORAL; SUBLINGUAL at 03:11

## 2021-11-18 RX ADMIN — VANCOMYCIN HYDROCHLORIDE 750 MG: 750 INJECTION, POWDER, LYOPHILIZED, FOR SOLUTION INTRAVENOUS at 08:11

## 2021-11-18 RX ADMIN — HYOSCYAMINE SULFATE 0.12 MG: 0.12 TABLET ORAL; SUBLINGUAL at 06:11

## 2021-11-18 RX ADMIN — Medication 10 ML: at 09:11

## 2021-11-18 RX ADMIN — PIPERACILLIN AND TAZOBACTAM 4.5 G: 4; .5 INJECTION, POWDER, LYOPHILIZED, FOR SOLUTION INTRAVENOUS; PARENTERAL at 08:11

## 2021-11-18 RX ADMIN — HYOSCYAMINE SULFATE 0.12 MG: 0.12 TABLET ORAL; SUBLINGUAL at 11:11

## 2021-11-18 RX ADMIN — ALPRAZOLAM 0.5 MG: 0.25 TABLET ORAL at 03:11

## 2021-11-19 ENCOUNTER — PATIENT OUTREACH (OUTPATIENT)
Dept: ADMINISTRATIVE | Facility: CLINIC | Age: 71
End: 2021-11-19
Payer: MEDICARE

## 2021-11-19 ENCOUNTER — TELEPHONE (OUTPATIENT)
Dept: MEDSURG UNIT | Facility: HOSPITAL | Age: 71
End: 2021-11-19
Payer: MEDICARE

## 2021-11-20 PROCEDURE — G0180 PR HOME HEALTH MD CERTIFICATION: ICD-10-PCS | Mod: ,,, | Performed by: HOSPITALIST

## 2021-11-20 PROCEDURE — G0180 MD CERTIFICATION HHA PATIENT: HCPCS | Mod: ,,, | Performed by: HOSPITALIST

## 2021-11-21 LAB
BACTERIA BLD CULT: NORMAL
BACTERIA BLD CULT: NORMAL

## 2021-11-23 DIAGNOSIS — M25.551 RIGHT HIP PAIN: Primary | ICD-10-CM

## 2021-11-26 ENCOUNTER — HOSPITAL ENCOUNTER (OUTPATIENT)
Dept: RADIOLOGY | Facility: HOSPITAL | Age: 71
Discharge: HOME | End: 2021-11-26
Attending: ORTHOPAEDIC SURGERY
Payer: MEDICARE

## 2021-11-26 ENCOUNTER — OFFICE VISIT (OUTPATIENT)
Dept: ORTHOPEDICS | Facility: CLINIC | Age: 71
End: 2021-11-26
Payer: MEDICARE

## 2021-11-26 VITALS — BODY MASS INDEX: 19.04 KG/M2 | WEIGHT: 97 LBS | HEIGHT: 60 IN

## 2021-11-26 DIAGNOSIS — Z87.81 S/P RIGHT HIP FRACTURE: Primary | ICD-10-CM

## 2021-11-26 DIAGNOSIS — M25.551 RIGHT HIP PAIN: ICD-10-CM

## 2021-11-26 PROCEDURE — 99213 OFFICE O/P EST LOW 20 MIN: CPT | Mod: PBBFAC,PN | Performed by: ORTHOPAEDIC SURGERY

## 2021-11-26 PROCEDURE — 73502 XR HIP WITH PELVIS WHEN PERFORMED, 2 OR 3  VIEWS RIGHT: ICD-10-PCS | Mod: 26,RT,, | Performed by: RADIOLOGY

## 2021-11-26 PROCEDURE — 99024 PR POST-OP FOLLOW-UP VISIT: ICD-10-PCS | Mod: POP,,, | Performed by: ORTHOPAEDIC SURGERY

## 2021-11-26 PROCEDURE — 99024 POSTOP FOLLOW-UP VISIT: CPT | Mod: POP,,, | Performed by: ORTHOPAEDIC SURGERY

## 2021-11-26 PROCEDURE — 99999 PR PBB SHADOW E&M-EST. PATIENT-LVL III: ICD-10-PCS | Mod: PBBFAC,,, | Performed by: ORTHOPAEDIC SURGERY

## 2021-11-26 PROCEDURE — 73502 X-RAY EXAM HIP UNI 2-3 VIEWS: CPT | Mod: TC,FY,RT

## 2021-11-26 PROCEDURE — 99999 PR PBB SHADOW E&M-EST. PATIENT-LVL III: CPT | Mod: PBBFAC,,, | Performed by: ORTHOPAEDIC SURGERY

## 2021-11-26 PROCEDURE — 73502 X-RAY EXAM HIP UNI 2-3 VIEWS: CPT | Mod: 26,RT,, | Performed by: RADIOLOGY

## 2021-11-29 ENCOUNTER — EXTERNAL HOME HEALTH (OUTPATIENT)
Dept: HOME HEALTH SERVICES | Facility: HOSPITAL | Age: 71
End: 2021-11-29
Payer: MEDICARE

## 2021-12-15 DIAGNOSIS — Z87.81 S/P RIGHT HIP FRACTURE: Primary | ICD-10-CM

## 2021-12-20 ENCOUNTER — OFFICE VISIT (OUTPATIENT)
Dept: ORTHOPEDICS | Facility: CLINIC | Age: 71
End: 2021-12-20
Payer: MEDICARE

## 2021-12-20 ENCOUNTER — HOSPITAL ENCOUNTER (OUTPATIENT)
Dept: RADIOLOGY | Facility: HOSPITAL | Age: 71
Discharge: HOME | End: 2021-12-20
Attending: ORTHOPAEDIC SURGERY
Payer: MEDICARE

## 2021-12-20 VITALS — HEIGHT: 60 IN | WEIGHT: 97 LBS | BODY MASS INDEX: 19.04 KG/M2 | RESPIRATION RATE: 18 BRPM

## 2021-12-20 DIAGNOSIS — Z87.81 S/P RIGHT HIP FRACTURE: ICD-10-CM

## 2021-12-20 DIAGNOSIS — Z87.81 S/P RIGHT HIP FRACTURE: Primary | ICD-10-CM

## 2021-12-20 PROCEDURE — 73502 X-RAY EXAM HIP UNI 2-3 VIEWS: CPT | Mod: 26,RT,, | Performed by: RADIOLOGY

## 2021-12-20 PROCEDURE — 99213 OFFICE O/P EST LOW 20 MIN: CPT | Mod: PBBFAC,PN | Performed by: ORTHOPAEDIC SURGERY

## 2021-12-20 PROCEDURE — 73502 X-RAY EXAM HIP UNI 2-3 VIEWS: CPT | Mod: TC,PN,RT

## 2021-12-20 PROCEDURE — 99999 PR PBB SHADOW E&M-EST. PATIENT-LVL III: CPT | Mod: PBBFAC,,, | Performed by: ORTHOPAEDIC SURGERY

## 2021-12-20 PROCEDURE — 99024 POSTOP FOLLOW-UP VISIT: CPT | Mod: POP,,, | Performed by: ORTHOPAEDIC SURGERY

## 2021-12-20 PROCEDURE — 99024 PR POST-OP FOLLOW-UP VISIT: ICD-10-PCS | Mod: POP,,, | Performed by: ORTHOPAEDIC SURGERY

## 2021-12-20 PROCEDURE — 99999 PR PBB SHADOW E&M-EST. PATIENT-LVL III: ICD-10-PCS | Mod: PBBFAC,,, | Performed by: ORTHOPAEDIC SURGERY

## 2021-12-20 PROCEDURE — 73502 XR HIP WITH PELVIS WHEN PERFORMED, 2 OR 3  VIEWS RIGHT: ICD-10-PCS | Mod: 26,RT,, | Performed by: RADIOLOGY

## 2021-12-20 RX ORDER — OMEPRAZOLE 40 MG/1
40 CAPSULE, DELAYED RELEASE ORAL 2 TIMES DAILY
COMMUNITY
Start: 2021-11-18

## 2021-12-20 RX ORDER — HYDROCODONE BITARTRATE AND ACETAMINOPHEN 5; 325 MG/1; MG/1
1 TABLET ORAL EVERY 6 HOURS PRN
Qty: 28 TABLET | Refills: 0 | Status: SHIPPED | OUTPATIENT
Start: 2021-12-20 | End: 2022-02-07 | Stop reason: SDUPTHER

## 2021-12-29 ENCOUNTER — TELEPHONE (OUTPATIENT)
Dept: ORTHOPEDICS | Facility: CLINIC | Age: 71
End: 2021-12-29
Payer: MEDICARE

## 2021-12-30 ENCOUNTER — TELEPHONE (OUTPATIENT)
Dept: FAMILY MEDICINE | Facility: CLINIC | Age: 71
End: 2021-12-30
Payer: MEDICARE

## 2022-01-19 PROCEDURE — G0179 PR HOME HEALTH MD RECERTIFICATION: ICD-10-PCS | Mod: ,,, | Performed by: ORTHOPAEDIC SURGERY

## 2022-01-19 PROCEDURE — G0179 MD RECERTIFICATION HHA PT: HCPCS | Mod: ,,, | Performed by: ORTHOPAEDIC SURGERY

## 2022-01-20 ENCOUNTER — TELEPHONE (OUTPATIENT)
Dept: ORTHOPEDICS | Facility: CLINIC | Age: 72
End: 2022-01-20
Payer: MEDICARE

## 2022-01-20 DIAGNOSIS — Z87.81 S/P RIGHT HIP FRACTURE: Primary | ICD-10-CM

## 2022-01-20 NOTE — TELEPHONE ENCOUNTER
----- Message from Diandra Velasquez MA sent at 1/20/2022 12:13 PM CST -----  Contact: Ms BRE chavira  Need order to recert for PT   can take verbal  call back   Fax     Call back

## 2022-01-21 ENCOUNTER — TELEPHONE (OUTPATIENT)
Dept: ORTHOPEDICS | Facility: CLINIC | Age: 72
End: 2022-01-21
Payer: MEDICARE

## 2022-01-21 DIAGNOSIS — Z87.81 S/P RIGHT HIP FRACTURE: Primary | ICD-10-CM

## 2022-01-21 NOTE — TELEPHONE ENCOUNTER
----- Message from Diandra Velasquez MA sent at 1/21/2022 11:19 AM CST -----  Contact: son  PT Orders to [facility]   Call back

## 2022-02-01 ENCOUNTER — EXTERNAL HOME HEALTH (OUTPATIENT)
Dept: HOME HEALTH SERVICES | Facility: HOSPITAL | Age: 72
End: 2022-02-01
Payer: MEDICARE

## 2022-02-02 ENCOUNTER — CLINICAL SUPPORT (OUTPATIENT)
Dept: REHABILITATION | Facility: HOSPITAL | Age: 72
End: 2022-02-02
Attending: ORTHOPAEDIC SURGERY
Payer: MEDICARE

## 2022-02-02 DIAGNOSIS — Z87.81 S/P RIGHT HIP FRACTURE: ICD-10-CM

## 2022-02-02 DIAGNOSIS — M25.551 ACUTE POSTOPERATIVE PAIN OF RIGHT HIP: ICD-10-CM

## 2022-02-02 DIAGNOSIS — Z74.09 DECREASED FUNCTIONAL MOBILITY AND ENDURANCE: ICD-10-CM

## 2022-02-02 DIAGNOSIS — G89.18 ACUTE POSTOPERATIVE PAIN OF RIGHT HIP: ICD-10-CM

## 2022-02-02 PROCEDURE — 97161 PT EVAL LOW COMPLEX 20 MIN: CPT | Mod: PN

## 2022-02-02 PROCEDURE — 97110 THERAPEUTIC EXERCISES: CPT | Mod: PN

## 2022-02-02 NOTE — PLAN OF CARE
[facility]  Physical Therapy Initial Evaluation / Plan Of Care    Name: Heri Jansen  Clinic Number: [id number]    Therapy Diagnosis:   Encounter Diagnoses   Name Primary?    S/P right hip fracture     Acute postoperative pain of right hip     Decreased functional mobility and endurance      Physician: Dontae Lopez MD    Physician Orders: PT Eval and Treat   Medical Diagnosis: Right hip fracture   Evaluation Date: 2/2/2022  Authorization period Expiration: 12/31/2022  Plan of Care Certification Period: 4/30/2022    Visit #: 1/ Visits authorized: 12  Time In: 2:30 pm   Time Out: 3:30 pm   Total Billable Time: 60  minutes    Precautions: Standard    Subjective   Date of onset: 11/11/2021  Date of Surgery: 11/13/2021 - Hemiarthroplasty Right hip     Past Medical History:   Diagnosis Date    Anxiety     GERD (gastroesophageal reflux disease)      Heri Jansen  has a past surgical history that includes Fracture surgery and Hemiarthroplasty of hip (Right, 11/13/2021).    Heri has a current medication list which includes the following prescription(s): alprazolam, cyproheptadine, hydrocodone-acetaminophen, hyoscyamine, lidocaine hcl 2%, omeprazole, and rivaroxaban.    Review of patient's allergies indicates:  No Known Allergies     Prior Therapy: Heri had HHPT for current condition - until last week;   Social History: Patient lives in a mobile home with ramp to enter; does have 4  steps with railing as well; lives with daughter and son in-law   Occupation: works at [employer]; currently out of leave; [employer]; manages the fitting rooms, returns.   Prior Level of Function: Independent function; was working 39 hours per week prior to fall; drove herself to/from work before falling.   Current Level of Function:  Ambulating with use of RW outside and community; No AD in the house with exception of nighttime to bathroom; Able to dress herself with exception of right shoe and  sock; able to shower independently but with someone in the home; stated that she  Can stand for about an hour; walking short distances;     Pain:  Current 5/10, worst 6/10, best 5/10   Location: hip   right  Description: Aching  Aggravating Factors: Standing  Easing Factors: rest      Onset/PRECIOUS: sudden - fell 12/11/2021     History of current condition - LAURA reports: She got up to go to the bathroom, turned to flush the toilet and got her foot caught on a rug; lost her balance and fell; sustained a right hip fracture (11/12/2021) Had right hemiarthroplasty on 11/13/2021; Received therapy while in hospital; then was discharged to home with HHPT/OT.  Heri is currently doing very well at home; She only uses her RW for community ambulation or when she is on an unstable surface. Patient reports that she is getting herself dressed with exception of right shoe/sock; Overall not having any pain greater than 5/10 - groin and right knee; Mild tenderness noted along incision on posterior hip;     Pts goals: To return to work, no pain in my hip and be able to walk without a limp.         Objective     Observation: Heri entered clinic with use of RW, walking well, good gait pace noted; clearing right foot well with each step on level surfaces. Heri is alert/oriented x 4; She is in no acute distress.   Able to go up/down 4 steps with bilateral railing - one foot per step in both directions. Very easy for her to do this; no LOB noted.     Posture:  Slight forward head, rounded shoulders; right valgus knee with slight loss of full extension.     Hip Range of Motion:   Left active Left Passive Right active  Right Passive   Flexion WFL WNL 95 100   Abduction WFL WNL 20 25   Extension WFL WNL 7 10   Ext. Rotation WFL WNL 15 20   Int. Rotation WFL WNL nt nt       Lower Extremity Strength   Right LE Left LE   Knee extension: 4+/5 5/5   Knee flexion: 4+/5 5/5   Hip flexion: 4+/5 5/5   Hip Internal Rotation:  Not tested     5/5  "     Hip External Rotation: 4+/5    5/5      Hip extension:  3/5 4+/5   Hip abduction: 3+/5 5/5   Hip adduction: 4/5 5/5   Ankle dorsiflexion: 5/5 5/5   Ankle plantarflexion: 5/5 5/5         SLS: Firm 10 sec,  Foam 6 sec  Toe Tap test: 6 reps on 6" step with unaffecting leg in 15 seconds  30 sec chair rise: 15 reps with arms crossed  4 square step test: 11 sec for one revolution (add 2 seconds for each multiple step in a square)    Palpation: minimal tenderness to palpation at right lateral hip     Sensation: intact to light touch BLE's     Flexibility:  Tightness in right hip flexor note; right quad tightness as well     PT Evaluation Completed? Yes  Discussed Plan of Care with patient: Yes    TREATMENT   Treatment Time In: 3:05  Treatment Time Out: 3:25   Total Treatment time separate from Evaluation: 15 minutes    LAURA received therapeutic exercises to develop strength and ROM for 20 minutes including:  Nu-Step - level 1 x 10 mins   Standing: achilles stretch 30 sec x 2  Standing: 3-way hip x 15 each direction   Standing: marching with single hand hold x 1 min     Home Exercises and Patient Education Provided    Education provided re:   - progress towards goals   - role of therapy in multi - disciplinary team, goals for therapy  Pt educated on condition, POC, and expectations in therapy.  No spiritual or educational barriers to learning provided    Home exercises:  Pt will be provided HEP during course of treatment with progressions as appropriate. Pt was advised to perform these exercises free of pain, and to stop performing them if pain occurs.   LAURA demonstrated good  understanding of the education provided.     Limitation/Restriction for FOTO Hip  Survey    Therapist reviewed FOTO scores for Heri Jansen on 2/2/2022.   FOTO documents entered into [employer] - see Media section.    Limitation Score: 37%           Assessment   Heri is a 71 y.o. female referred to outpatient physical therapy with a " medical diagnosis of Right hip fracture - s/p hemiarthroplasty, and presents to PT with Right hip pain, right LE weakness, decreased functional mobility and endurance; decreased LE function . Patient demonstrates limitations as described in the problem list. Pt will benefit from physcial therapy services in order to maximize pain free and/or functional use of right LE . The following goals were discussed with the patient and patient is in agreement with them as to be addressed in the treatment plan.   Pt prognosis is Excellent.   Pt will benefit from skilled outpatient Physical Therapy to address the deficits stated above and in the chart below, provide pt/family education, and to maximize pt's level of independence.     Plan of care discussed with patient: Yes  Pt's spiritual, cultural and educational needs considered and pt agreeable to plan of care and goals as stated below:     Anticipated Barriers for therapy: none    Medical necessity is demonstrated by the following IMPAIRMENTS/PROBLEM LIST:    weakness, impaired endurance, impaired functional mobility, gait instability, impaired balance, decreased lower extremity function, pain and decreased ROM      Medical Necessity is demonstrated by the following  History  Co-morbidities and personal factors that may impact the plan of care Co-morbidities:   anxiety and lumbar compression fracture     Personal Factors:   no deficits  0= low, 1-2 = mod, 3+ = high   low   Examination  Body Structures and Functions, activity limitations and participation restrictions that may impact the plan of care Body Regions:   lower extremities    Body Systems:    gross symmetry  ROM  strength  balance  gait    Participation Restrictions:   none    Activity limitations:   Mobility  lifting and carrying objects  walking  driving (bike, car, motorcycle)    Self care  washing oneself (bathing, drying, washing hands)  dressing    Domestic Life  shopping  cooking  doing house work (cleaning  house, washing dishes, laundry)    Community and Social Life  community life  recreation and leisure  1-2 = low, 3+ = mod, 4+ = high       low   Clinical Presentation stable and uncomplicated  Stable/uncomplicated = low, evolving/changing = mod,  Unstable/unpredictable = high low   Decision Making/ Complexity Score: low           Goals     Short Term Goals: 3 weeks  Pain:decrease pain to less than 3/10 in right hip with daily activities   Gait: able to walk at community distances without use of AD   Demonstrate compliance with initial exercise program    Long Term Goals: 6 weeks    Pain: Decrease pain to 0/10 to allow for improvement in daily activities   Strength: Improve strength in right Hip to 5/5 for improved LE stability  ROM: Improve ROM to 90% of normal limits   Functional scale: Improve score on FOTO/Hip  to 24% limitation   Lifting: Able to lift groceries from cart/car and carry into house without increased pain/compensation   Walking: Increase walking duration to 20 mins without pain  Postures: Increase sitting and/or standing duration to 60 mins without pain to allow her to return to work   Transfers: Perform all transfers without increased pain or limitation  Exercise: demonstrate independence with home exercise program to maintain gains made in therapy.          Plan   Certification Period: 2/2/2022 to 4/30/2022.    Outpatient Physical Therapy 2 times weekly for 6 weeks to include the following interventions: patient education, Gait Training, Manual Therapy, Neuromuscular Re-ed, Patient Education, Therapeutic Activities and Therapeutic Exercise.   Pt may be seen by PTA as part of the rehabilitation team.     I certify the need for these services furnished under this plan of treatment and while under my care.    Airam Mata, PT          Attestation:   I have seen the patient, reviewed the therapist's plan of care, and I agree with the plan of care.   I certify the need for these services furnished under  this plan of treatment and while under my care.         _______________            ________                                               _____________________  Physician/Referring Practitioner                                                            Date of Signature

## 2022-02-03 ENCOUNTER — TELEPHONE (OUTPATIENT)
Dept: ORTHOPEDICS | Facility: CLINIC | Age: 72
End: 2022-02-03
Payer: MEDICARE

## 2022-02-03 DIAGNOSIS — Z87.81 S/P RIGHT HIP FRACTURE: Primary | ICD-10-CM

## 2022-02-03 NOTE — TELEPHONE ENCOUNTER
----- Message from Diandra Velasquez MA sent at 2/3/2022 12:14 PM CST -----  Contact: son in law, demarcus  [phone]    Wants to reschedule mother in laws appt

## 2022-02-07 ENCOUNTER — HOSPITAL ENCOUNTER (OUTPATIENT)
Dept: RADIOLOGY | Facility: HOSPITAL | Age: 72
Discharge: HOME | End: 2022-02-07
Attending: ORTHOPAEDIC SURGERY
Payer: MEDICARE

## 2022-02-07 ENCOUNTER — OFFICE VISIT (OUTPATIENT)
Dept: ORTHOPEDICS | Facility: CLINIC | Age: 72
End: 2022-02-07
Payer: MEDICARE

## 2022-02-07 VITALS — HEIGHT: 60 IN | WEIGHT: 97 LBS | BODY MASS INDEX: 19.04 KG/M2 | RESPIRATION RATE: 16 BRPM

## 2022-02-07 DIAGNOSIS — Z87.81 S/P RIGHT HIP FRACTURE: ICD-10-CM

## 2022-02-07 DIAGNOSIS — Z87.81 S/P RIGHT HIP FRACTURE: Primary | ICD-10-CM

## 2022-02-07 DIAGNOSIS — Z96.649 STATUS POST HIP HEMIARTHROPLASTY: ICD-10-CM

## 2022-02-07 PROCEDURE — 99213 OFFICE O/P EST LOW 20 MIN: CPT | Mod: PBBFAC,PN | Performed by: ORTHOPAEDIC SURGERY

## 2022-02-07 PROCEDURE — 99024 PR POST-OP FOLLOW-UP VISIT: ICD-10-PCS | Mod: POP,,, | Performed by: ORTHOPAEDIC SURGERY

## 2022-02-07 PROCEDURE — 73502 X-RAY EXAM HIP UNI 2-3 VIEWS: CPT | Mod: TC,PN,RT

## 2022-02-07 PROCEDURE — 99024 POSTOP FOLLOW-UP VISIT: CPT | Mod: POP,,, | Performed by: ORTHOPAEDIC SURGERY

## 2022-02-07 PROCEDURE — 73502 X-RAY EXAM HIP UNI 2-3 VIEWS: CPT | Mod: 26,RT,, | Performed by: RADIOLOGY

## 2022-02-07 PROCEDURE — 99999 PR PBB SHADOW E&M-EST. PATIENT-LVL III: ICD-10-PCS | Mod: PBBFAC,,, | Performed by: ORTHOPAEDIC SURGERY

## 2022-02-07 PROCEDURE — 99999 PR PBB SHADOW E&M-EST. PATIENT-LVL III: CPT | Mod: PBBFAC,,, | Performed by: ORTHOPAEDIC SURGERY

## 2022-02-07 PROCEDURE — 73502 XR HIP WITH PELVIS WHEN PERFORMED, 2 OR 3  VIEWS RIGHT: ICD-10-PCS | Mod: 26,RT,, | Performed by: RADIOLOGY

## 2022-02-07 RX ORDER — AZELASTINE 1 MG/ML
SPRAY, METERED NASAL
COMMUNITY
Start: 2022-01-15

## 2022-02-07 RX ORDER — FAMOTIDINE 40 MG/1
40 TABLET, FILM COATED ORAL
COMMUNITY
Start: 2021-07-30 | End: 2022-11-30

## 2022-02-07 RX ORDER — FLUTICASONE PROPIONATE 50 MCG
SPRAY, SUSPENSION (ML) NASAL
COMMUNITY
Start: 2022-01-15 | End: 2023-08-04

## 2022-02-07 RX ORDER — ALPRAZOLAM 0.5 MG/1
TABLET ORAL
COMMUNITY
Start: 2022-01-15

## 2022-02-07 RX ORDER — HYDROCODONE BITARTRATE AND ACETAMINOPHEN 5; 325 MG/1; MG/1
1 TABLET ORAL EVERY 6 HOURS PRN
Qty: 28 TABLET | Refills: 0 | Status: SHIPPED | OUTPATIENT
Start: 2022-02-07 | End: 2022-03-16 | Stop reason: SDUPTHER

## 2022-02-07 NOTE — PROGRESS NOTES
Past Medical History:   Diagnosis Date    Anxiety     GERD (gastroesophageal reflux disease)        Past Surgical History:   Procedure Laterality Date    FRACTURE SURGERY      HEMIARTHROPLASTY OF HIP Right 2021    Procedure: HEMIARTHROPLASTY, HIP;  Surgeon: Dontae Lopez MD;  Location: [facility];  Service: Orthopedics;  Laterality: Right;       Current Outpatient Medications   Medication Sig    alprazolam (XANAX XR) 1 MG Tb24 Take 0.5 mg by mouth once daily.    azelastine (ASTELIN) 137 mcg (0.1 %) nasal spray TAKE 2 PUFFS EACH NOSTRIL TWICE A DAY    cyproheptadine (PERIACTIN) 4 mg tablet Take 0.125 mg by mouth 4 (four) times daily before meals and nightly. DISSOLVE 1 TABLET ORALLY EVERY 4 HOURS BEFORE MEALS    famotidine (PEPCID) 40 MG tablet 40 mg.    fluticasone propionate (FLONASE) 50 mcg/actuation nasal spray SMARTSI Puff(s) Both Nares Twice Daily    hyoscyamine (LEVSIN/SL) 0.125 mg Subl Place 0.125 mg under the tongue every 4 (four) hours as needed. Take sublingual before meals    lidocaine HCl 2% (XYLOCAINE) 2 % Soln Take 5 mLs by mouth every 4 (four) hours.    omeprazole (PRILOSEC) 40 MG capsule Take 40 mg by mouth 2 (two) times daily.    ALPRAZolam (XANAX) 0.5 MG tablet SMARTSI Tablet(s) By Mouth Every 12 Hours PRN    HYDROcodone-acetaminophen (NORCO) 5-325 mg per tablet Take 1 tablet by mouth every 6 (six) hours as needed for Pain.    rivaroxaban (XARELTO) 10 mg Tab Take 1 tablet (10 mg total) by mouth daily with dinner or evening meal. for 5 days     No current facility-administered medications for this visit.       Review of patient's allergies indicates:  No Known Allergies    Family History   Problem Relation Age of Onset    Heart disease Mother        Social History     Socioeconomic History    Marital status:    Tobacco Use    Smoking status: Never Smoker    Smokeless tobacco: Never Used   Substance and Sexual Activity    Alcohol use: Yes     Comment: a bottle  of wine or 6 glasses of beer daily    Drug use: No       Chief Complaint:   Chief Complaint   Patient presents with    Right Hip - Post-op Evaluation     Right Hip Ilir Arthroplasty on 11/13/2021       Consulting Physician: No ref. provider found    History of present illness: This is a 71 y.o. female following up for right hip hemiarthroplasty on 11/13/2021.      Review of Systems:    Constitution: Denies chills, fever, and sweats.    HENT: Denies headaches or blurry vision.    Cardiovascular: Denies chest pain or irregular heart beat.    Respiratory: Denies cough or shortness of breath.    Gastrointestinal: Denies abdominal pain, nausea, or vomiting.    Musculoskeletal:  Denies muscle cramps.    Neurological: Denies dizziness or focal weakness.    Psychiatric/Behavioral: Normal mental status.    Hematologic/Lymphatic: Denies bleeding problem or easy bruising/bleeding.    Skin: Denies rash or suspicious lesions.      Examination:    Vital Signs:    Vitals:    02/07/22 1107   Resp: 16       Body mass index is 18.94 kg/m².    This a well-developed, well nourished patient in no acute distress.    Alert and oriented and cooperative to examination.       Physical Exam:  Right hip    Inspection/Observation   Swelling:   None  Erythema:   none  Bruising:   none  Wounds:   Healed  Drainage:  None    Calf   Soft, non-tender    Range of Motion   Near normal    Muscle Strength   4+    Other   Sensation:  normal  Pulse:    palpable    Imaging: Normal post-operative XR     Assessment: S/P right hip fracture  -     Ambulatory referral/consult to Physical/Occupational Therapy; Future; Expected date: 02/14/2022    Status post hip hemiarthroplasty  -     Ambulatory referral/consult to Physical/Occupational Therapy; Future; Expected date: 02/14/2022        Plan:  She puts her pain at 7/10 after activities.  She is walking well with a walker.  She reports that she feels well.  She just started outpatient physical therapy so we will  continue.  We refilled her Norco prescription.  Back in 6 weeks. Out of work at this time.      DISCLAIMER: This note may have been dictated using voice recognition software and may contain grammatical errors. Report sent to referring provider as required.

## 2022-02-08 ENCOUNTER — DOCUMENTATION ONLY (OUTPATIENT)
Dept: REHABILITATION | Facility: HOSPITAL | Age: 72
End: 2022-02-08
Payer: MEDICARE

## 2022-02-08 ENCOUNTER — DOCUMENT SCAN (OUTPATIENT)
Dept: HOME HEALTH SERVICES | Facility: HOSPITAL | Age: 72
End: 2022-02-08
Payer: MEDICARE

## 2022-02-08 ENCOUNTER — CLINICAL SUPPORT (OUTPATIENT)
Dept: REHABILITATION | Facility: HOSPITAL | Age: 72
End: 2022-02-08
Attending: ORTHOPAEDIC SURGERY
Payer: MEDICARE

## 2022-02-08 DIAGNOSIS — Z96.649 STATUS POST HIP HEMIARTHROPLASTY: ICD-10-CM

## 2022-02-08 DIAGNOSIS — M25.551 ACUTE POSTOPERATIVE PAIN OF RIGHT HIP: ICD-10-CM

## 2022-02-08 DIAGNOSIS — G89.18 ACUTE POSTOPERATIVE PAIN OF RIGHT HIP: ICD-10-CM

## 2022-02-08 DIAGNOSIS — Z87.81 S/P RIGHT HIP FRACTURE: ICD-10-CM

## 2022-02-08 DIAGNOSIS — Z74.09 DECREASED FUNCTIONAL MOBILITY AND ENDURANCE: Primary | ICD-10-CM

## 2022-02-08 PROCEDURE — 97110 THERAPEUTIC EXERCISES: CPT | Mod: PN,CQ

## 2022-02-08 NOTE — PROGRESS NOTES
PT met face to face with Jonathan Favre, PTA to discuss patient's treatment plan and progress towards established goals.  Treatment will be continued as described in initial report/eval and progress notes.  Patient will be seen by physical therapist every sixth visit and minimally once per month.    Additional information: Right hip hemiarthroplasty; usual restrictions for MARILUZ.

## 2022-02-08 NOTE — PROGRESS NOTES
"                                                    Physical Therapy Daily Note     Name: Heri GARCIA [facility]  Clinic Number: [id number]  Diagnosis:   Encounter Diagnoses   Name Primary?    S/P right hip fracture     Status post hip hemiarthroplasty     Acute postoperative pain of right hip     Decreased functional mobility and endurance Yes     Physician: Dontae Lopez MD  Precautions: Standard; Hemiarthroplasty Right hip   Visit #: 2 of 12  PTA Visit #: 1  Time In: 1:50 PM  Time Out: 2:45 PM    Subjective     Pt reports: No new c/o's.   Pain Scale: Heri rates pain on a scale of 0-10 to be 5 currently.    Objective     Heri received individual therapeutic exercises to develop strength, endurance, ROM and flexibility for 40 minutes including:  Nustep level 1 x 15 min  Heel Raises x 15  Toe Taps x 2 min on 6" step  Step-ups x 15 on 6" step  Standing hip flex/ext/abd x 15  Heel Cord stretch on wedge 3 x 30 sec  Sit to stand x 10  LAQ x 20  Pelvic Tilts x 15  Bridges x 10  Ball Squeezes x 2 min  QS x 2 min  SLR 2 x 10  SAQ x 3 min on small blue roll      Heri received the following manual therapy techniques:  were applied to the:  for  minutes including:       The patient received the following direct contact modalities after being cleared for contraindications:     The patient received the following supervised modalities after being cleared for contradictions:     Written Home Exercises Provided:   Pt demo good understanding of the education provided. Heri demonstrated good return demonstration of activities.     Education provided re:  Heri verbalized good understanding of education provided.   No spiritual or educational barriers to learning provided    Assessment     Patient did well with exercises; no increased symptoms noted; went back over hip precautions with patient-verbalizes and demonstrates understanding.   This is a 71 y.o. female referred to outpatient physical therapy and presents with a " medical diagnosis of Right hip fracture - s/p hemiarthroplasty, and presents to PT with Right hip pain, right LE weakness, decreased functional mobility and endurance; decreased LE function and demonstrates limitations as described in the problem list. Pt prognosis is Excellent. Pt will continue to benefit from skilled outpatient physical therapy to address the deficits listed in the problem list, provide pt/family education and to maximize pt's level of independence in the home and community environment.     GOALS:  Short Term Goals: 3 weeks  Pain:decrease pain to less than 3/10 in right hip with daily activities   Gait: able to walk at community distances without use of AD   Demonstrate compliance with initial exercise program     Long Term Goals: 6 weeks     Pain: Decrease pain to 0/10 to allow for improvement in daily activities   Strength: Improve strength in right Hip to 5/5 for improved LE stability  ROM: Improve ROM to 90% of normal limits   Functional scale: Improve score on FOTO/Hip  to 24% limitation   Lifting: Able to lift groceries from cart/car and carry into house without increased pain/compensation   Walking: Increase walking duration to 20 mins without pain  Postures: Increase sitting and/or standing duration to 60 mins without pain to allow her to return to work   Transfers: Perform all transfers without increased pain or limitation  Exercise: demonstrate independence with home exercise program to maintain gains made in therapy.          Plan     Continue with established Plan of Care towards PT goals.    Therapist: Jonathan Favre, PTA  2/8/2022

## 2022-02-10 ENCOUNTER — CLINICAL SUPPORT (OUTPATIENT)
Dept: REHABILITATION | Facility: HOSPITAL | Age: 72
End: 2022-02-10
Attending: ORTHOPAEDIC SURGERY
Payer: MEDICARE

## 2022-02-10 DIAGNOSIS — G89.18 ACUTE POSTOPERATIVE PAIN OF RIGHT HIP: ICD-10-CM

## 2022-02-10 DIAGNOSIS — Z74.09 DECREASED FUNCTIONAL MOBILITY AND ENDURANCE: Primary | ICD-10-CM

## 2022-02-10 DIAGNOSIS — M25.551 ACUTE POSTOPERATIVE PAIN OF RIGHT HIP: ICD-10-CM

## 2022-02-10 PROCEDURE — 97110 THERAPEUTIC EXERCISES: CPT | Mod: PN,CQ

## 2022-02-10 NOTE — PROGRESS NOTES
"                                                    Physical Therapy Daily Note     Name: Heri GARCIA [facility]  Clinic Number: [id number]  Diagnosis:   Encounter Diagnoses   Name Primary?    Acute postoperative pain of right hip     Decreased functional mobility and endurance Yes     Physician: Dontae Lopez MD  Precautions: Standard; Hemiarthroplasty Right hip   Visit #: 3 of 12  PTA Visit #: 2  Time In: 2:50 PM  Time Out: 3:42 PM    Subjective     Pt reports: No new c/o's.   Pain Scale: Heri rates pain on a scale of 0-10 to be 4-5 currently.    Objective     Heri received individual therapeutic exercises to develop strength, endurance, ROM and flexibility for 40 minutes including:  Nustep level 1 x 15 min  Heel Raises x 15  Toe Taps x 2 min on 6" step  Step-ups x 15 on 6" step  Standing hip flex/ext/abd x 15  Heel Cord stretch on wedge 3 x 30 sec  Sit to stand x 10  LAQ x 20  Pelvic Tilts x 15  Bridges x 10  Ball Squeezes x 2 min  QS x 2 min  SLR 2 x 10  SAQ x 3 min on small blue roll      Heri received the following manual therapy techniques:  were applied to the:  for  minutes including:       The patient received the following direct contact modalities after being cleared for contraindications:     The patient received the following supervised modalities after being cleared for contradictions:     Written Home Exercises Provided:   Pt demo good understanding of the education provided. Heri demonstrated good return demonstration of activities.     Education provided re:  Heri verbalized good understanding of education provided.   No spiritual or educational barriers to learning provided    Assessment     Patient did well with exercises; no increased symptoms noted; went back over hip precautions with patient-verbalizes and demonstrates understanding.   This is a 71 y.o. female referred to outpatient physical therapy and presents with a medical diagnosis of Right hip fracture - s/p hemiarthroplasty, and " presents to PT with Right hip pain, right LE weakness, decreased functional mobility and endurance; decreased LE function and demonstrates limitations as described in the problem list. Pt prognosis is Excellent. Pt will continue to benefit from skilled outpatient physical therapy to address the deficits listed in the problem list, provide pt/family education and to maximize pt's level of independence in the home and community environment.     GOALS:  Short Term Goals: 3 weeks  Pain:decrease pain to less than 3/10 in right hip with daily activities   Gait: able to walk at community distances without use of AD   Demonstrate compliance with initial exercise program     Long Term Goals: 6 weeks     Pain: Decrease pain to 0/10 to allow for improvement in daily activities   Strength: Improve strength in right Hip to 5/5 for improved LE stability  ROM: Improve ROM to 90% of normal limits   Functional scale: Improve score on FOTO/Hip  to 24% limitation   Lifting: Able to lift groceries from cart/car and carry into house without increased pain/compensation   Walking: Increase walking duration to 20 mins without pain  Postures: Increase sitting and/or standing duration to 60 mins without pain to allow her to return to work   Transfers: Perform all transfers without increased pain or limitation  Exercise: demonstrate independence with home exercise program to maintain gains made in therapy.          Plan     Continue with established Plan of Care towards PT goals.    Therapist: Jonathan Favre, PTA  2/10/2022

## 2022-02-15 ENCOUNTER — CLINICAL SUPPORT (OUTPATIENT)
Dept: REHABILITATION | Facility: HOSPITAL | Age: 72
End: 2022-02-15
Attending: ORTHOPAEDIC SURGERY
Payer: MEDICARE

## 2022-02-15 DIAGNOSIS — Z74.09 DECREASED FUNCTIONAL MOBILITY AND ENDURANCE: ICD-10-CM

## 2022-02-15 DIAGNOSIS — G89.18 ACUTE POSTOPERATIVE PAIN OF RIGHT HIP: ICD-10-CM

## 2022-02-15 DIAGNOSIS — M25.551 ACUTE POSTOPERATIVE PAIN OF RIGHT HIP: ICD-10-CM

## 2022-02-15 PROCEDURE — 97110 THERAPEUTIC EXERCISES: CPT | Mod: PN,CQ

## 2022-02-15 NOTE — PROGRESS NOTES
"                                                    Physical Therapy Daily Note     Name: Heri GARCIA [facility] Number: [id number]  Diagnosis:   Encounter Diagnoses   Name Primary?    Acute postoperative pain of right hip     Decreased functional mobility and endurance      Physician: Dontae Lopez MD  Precautions: Standard; Hemiarthroplasty Right hip   Visit #: 4 of 12  PTA Visit #: 3  Time In: 4:00 PM  Time Out: 4:40 PM    Subjective     Pt reports: doing ok today.  Pain Scale: Heri rates pain on a scale of 0-10 to be 4-5 currently.    Objective     Heri received individual therapeutic exercises to develop strength, endurance, ROM and flexibility for 40 minutes including:  Nustep level 2 x 10 min  Heel Raises x 15  Toe Taps x 2 min on 6" step   Step-ups x 15 on 6" step DNP  Standing hip flex/ext/abd x 15  Heel Cord stretch on wedge 3 x 30 sec  Sit to stand x 10  LAQ x 20  Pelvic Tilts x 15   Bridges x 10  Ball Squeezes x 15  QS x 2 min  SLR 2 x 10  SAQ x 3 min on small blue roll   Stairs x 3      Heri received the following manual therapy techniques:  were applied to the:  for  minutes including:       The patient received the following direct contact modalities after being cleared for contraindications:     The patient received the following supervised modalities after being cleared for contradictions:     Written Home Exercises Provided:   Pt demo good understanding of the education provided. Heri demonstrated good return demonstration of activities.     Education provided re:  Heri verbalized good understanding of education provided.   No spiritual or educational barriers to learning provided    Assessment     Patient did well with exercises; no increased symptoms noted; went back over hip precautions with patient-verbalizes and demonstrates understanding.   This is a 71 y.o. female referred to outpatient physical therapy and presents with a medical diagnosis of Right hip fracture - s/p " hemiarthroplasty, and presents to PT with Right hip pain, right LE weakness, decreased functional mobility and endurance; decreased LE function and demonstrates limitations as described in the problem list. Pt prognosis is Excellent. Pt will continue to benefit from skilled outpatient physical therapy to address the deficits listed in the problem list, provide pt/family education and to maximize pt's level of independence in the home and community environment.     GOALS:  Short Term Goals: 3 weeks  Pain:decrease pain to less than 3/10 in right hip with daily activities   Gait: able to walk at community distances without use of AD   Demonstrate compliance with initial exercise program     Long Term Goals: 6 weeks     Pain: Decrease pain to 0/10 to allow for improvement in daily activities   Strength: Improve strength in right Hip to 5/5 for improved LE stability  ROM: Improve ROM to 90% of normal limits   Functional scale: Improve score on FOTO/Hip  to 24% limitation   Lifting: Able to lift groceries from cart/car and carry into house without increased pain/compensation   Walking: Increase walking duration to 20 mins without pain  Postures: Increase sitting and/or standing duration to 60 mins without pain to allow her to return to work   Transfers: Perform all transfers without increased pain or limitation  Exercise: demonstrate independence with home exercise program to maintain gains made in therapy.          Plan     Continue with established Plan of Care towards PT goals.    Therapist: Bright Rawls, PTA  2/15/2022

## 2022-02-17 ENCOUNTER — CLINICAL SUPPORT (OUTPATIENT)
Dept: REHABILITATION | Facility: HOSPITAL | Age: 72
End: 2022-02-17
Attending: ORTHOPAEDIC SURGERY
Payer: MEDICARE

## 2022-02-17 DIAGNOSIS — M25.551 ACUTE POSTOPERATIVE PAIN OF RIGHT HIP: Primary | ICD-10-CM

## 2022-02-17 DIAGNOSIS — G89.18 ACUTE POSTOPERATIVE PAIN OF RIGHT HIP: Primary | ICD-10-CM

## 2022-02-17 DIAGNOSIS — Z74.09 DECREASED FUNCTIONAL MOBILITY AND ENDURANCE: ICD-10-CM

## 2022-02-17 PROCEDURE — 97110 THERAPEUTIC EXERCISES: CPT | Mod: PN,CQ

## 2022-02-17 NOTE — PROGRESS NOTES
"                                                    Physical Therapy Daily Note     Name: Heri GARCIA [facility] Number: [id number]  Diagnosis:   Encounter Diagnoses   Name Primary?    Acute postoperative pain of right hip Yes    Decreased functional mobility and endurance      Physician: Dontae Lopez MD  Precautions: Standard; Hemiarthroplasty Right hip   Visit #: 5 of 12  PTA Visit #: 3  Time In: 3:40 PM  Time Out: 4:20 PM    Subjective     Pt reports: doing ok today.  Pain Scale: Heri rates pain on a scale of 0-10 to be 2 currently.    Objective     Heri received individual therapeutic exercises to develop strength, endurance, ROM and flexibility for 40 minutes including:  Nustep level 4 x 12 min  Heel Raises x 15  Toe Taps x 2 min on 6" step    Step-ups x 15 on 6" step DNP  Standing hip flex/ext/abd x 15  Heel Cord stretch on wedge 3 x 30 sec  Sit to stand x 10  LAQ x 20  Pelvic Tilts x 15   Bridges x 10  Ball Squeezes x 15  QS x 2 min  SLR 2 x 10  SAQ x 3 min on small blue roll   Stairs x 3      Heri received the following manual therapy techniques:  were applied to the:  for  minutes including:       The patient received the following direct contact modalities after being cleared for contraindications:     The patient received the following supervised modalities after being cleared for contradictions:     Written Home Exercises Provided:   Pt demo good understanding of the education provided. Heri demonstrated good return demonstration of activities.     Education provided re:  Heri verbalized good understanding of education provided.   No spiritual or educational barriers to learning provided    Assessment     Patient did well with exercises; no increased symptoms noted; went back over hip precautions with patient-verbalizes and demonstrates understanding.   This is a 71 y.o. female referred to outpatient physical therapy and presents with a medical diagnosis of Right hip fracture - s/p " hemiarthroplasty, and presents to PT with Right hip pain, right LE weakness, decreased functional mobility and endurance; decreased LE function and demonstrates limitations as described in the problem list. Pt prognosis is Excellent. Pt will continue to benefit from skilled outpatient physical therapy to address the deficits listed in the problem list, provide pt/family education and to maximize pt's level of independence in the home and community environment.     GOALS:  Short Term Goals: 3 weeks  Pain:decrease pain to less than 3/10 in right hip with daily activities   Gait: able to walk at community distances without use of AD   Demonstrate compliance with initial exercise program     Long Term Goals: 6 weeks     Pain: Decrease pain to 0/10 to allow for improvement in daily activities   Strength: Improve strength in right Hip to 5/5 for improved LE stability  ROM: Improve ROM to 90% of normal limits   Functional scale: Improve score on FOTO/Hip  to 24% limitation   Lifting: Able to lift groceries from cart/car and carry into house without increased pain/compensation   Walking: Increase walking duration to 20 mins without pain  Postures: Increase sitting and/or standing duration to 60 mins without pain to allow her to return to work   Transfers: Perform all transfers without increased pain or limitation  Exercise: demonstrate independence with home exercise program to maintain gains made in therapy.          Plan     Continue with established Plan of Care towards PT goals.    Therapist: Bright Rawls, PTA  2/17/2022

## 2022-02-22 ENCOUNTER — CLINICAL SUPPORT (OUTPATIENT)
Dept: REHABILITATION | Facility: HOSPITAL | Age: 72
End: 2022-02-22
Attending: ORTHOPAEDIC SURGERY
Payer: MEDICARE

## 2022-02-22 DIAGNOSIS — M25.551 ACUTE POSTOPERATIVE PAIN OF RIGHT HIP: Primary | ICD-10-CM

## 2022-02-22 DIAGNOSIS — Z74.09 DECREASED FUNCTIONAL MOBILITY AND ENDURANCE: ICD-10-CM

## 2022-02-22 DIAGNOSIS — G89.18 ACUTE POSTOPERATIVE PAIN OF RIGHT HIP: Primary | ICD-10-CM

## 2022-02-22 PROCEDURE — 97110 THERAPEUTIC EXERCISES: CPT | Mod: PN,CQ

## 2022-02-22 NOTE — PROGRESS NOTES
"                                                    Physical Therapy Daily Note     Name: Heri GARCIA [facility]  Clinic Number: [id number]  Diagnosis:   Encounter Diagnoses   Name Primary?    Acute postoperative pain of right hip Yes    Decreased functional mobility and endurance      Physician: Dontae Lopez MD  Precautions: Standard; Hemiarthroplasty Right hip   Visit #: 6 of 12  PTA Visit #: 4  Time In: 4:35 PM  Time Out: 5:15 PM    Subjective     Pt reports: her right groin pain is better.  Pain Scale: Heri rates pain on a scale of 0-10 to be 3 currently.    Objective     Heri received individual therapeutic exercises to develop strength, endurance, ROM and flexibility for 40 minutes including:  Nustep level 4 x 12 min  Heel Raises x 15  Toe Taps x 2 min on 6" step     Step-ups x 15 on 6" step DNP  Standing hip flex/ext/abd x 15  Heel Cord stretch on wedge 3 x 30 sec  Sit to stand x 10  LAQ x 20  Pelvic Tilts x 15   Bridges x 10  Ball Squeezes x 15  QS x 2 min  SLR 2 x 10  SAQ x 3 min on small blue roll   Stairs x 3      Heri received the following manual therapy techniques:  were applied to the:  for  minutes including:       The patient received the following direct contact modalities after being cleared for contraindications:     The patient received the following supervised modalities after being cleared for contradictions:     Written Home Exercises Provided:   Pt demo good understanding of the education provided. Heri demonstrated good return demonstration of activities.     Education provided re:  Heri verbalized good understanding of education provided.   No spiritual or educational barriers to learning provided    Assessment     Patient did well with exercises; no increased symptoms noted; went back over hip precautions with patient-verbalizes and demonstrates understanding.   This is a 71 y.o. female referred to outpatient physical therapy and presents with a medical diagnosis of Right hip " fracture - s/p hemiarthroplasty, and presents to PT with Right hip pain, right LE weakness, decreased functional mobility and endurance; decreased LE function and demonstrates limitations as described in the problem list. Pt prognosis is Excellent. Pt will continue to benefit from skilled outpatient physical therapy to address the deficits listed in the problem list, provide pt/family education and to maximize pt's level of independence in the home and community environment.     GOALS:  Short Term Goals: 3 weeks  Pain:decrease pain to less than 3/10 in right hip with daily activities   Gait: able to walk at community distances without use of AD   Demonstrate compliance with initial exercise program     Long Term Goals: 6 weeks     Pain: Decrease pain to 0/10 to allow for improvement in daily activities   Strength: Improve strength in right Hip to 5/5 for improved LE stability  ROM: Improve ROM to 90% of normal limits   Functional scale: Improve score on FOTO/Hip  to 24% limitation   Lifting: Able to lift groceries from cart/car and carry into house without increased pain/compensation   Walking: Increase walking duration to 20 mins without pain  Postures: Increase sitting and/or standing duration to 60 mins without pain to allow her to return to work   Transfers: Perform all transfers without increased pain or limitation  Exercise: demonstrate independence with home exercise program to maintain gains made in therapy.          Plan     Continue with established Plan of Care towards PT goals.    Therapist: Bright Rawls, PTA  2/22/2022

## 2022-02-24 ENCOUNTER — CLINICAL SUPPORT (OUTPATIENT)
Dept: REHABILITATION | Facility: HOSPITAL | Age: 72
End: 2022-02-24
Attending: ORTHOPAEDIC SURGERY
Payer: MEDICARE

## 2022-02-24 DIAGNOSIS — G89.18 ACUTE POSTOPERATIVE PAIN OF RIGHT HIP: Primary | ICD-10-CM

## 2022-02-24 DIAGNOSIS — Z74.09 DECREASED FUNCTIONAL MOBILITY AND ENDURANCE: ICD-10-CM

## 2022-02-24 DIAGNOSIS — M25.551 ACUTE POSTOPERATIVE PAIN OF RIGHT HIP: Primary | ICD-10-CM

## 2022-02-24 PROCEDURE — 97110 THERAPEUTIC EXERCISES: CPT | Mod: PN,CQ

## 2022-02-24 NOTE — PROGRESS NOTES
"                                                    Physical Therapy Daily Note     Name: Heri GARCIA [facility]  Clinic Number: [id number]  Diagnosis:   Encounter Diagnoses   Name Primary?    Acute postoperative pain of right hip Yes    Decreased functional mobility and endurance      Physician: Dontae Lopez MD  Precautions: Standard; Hemiarthroplasty Right hip   Visit #: 7 of 12  PTA Visit #: 5  Time In: 4:00 PM  Time Out: 4:45 PM    Subjective     Pt reports: hurting a little in the morning but it gets better as the day goes on.  Pain Scale: Heri rates pain on a scale of 0-10 to be 2-3 currently.    Objective     Heri received individual therapeutic exercises to develop strength, endurance, ROM and flexibility for 40 minutes including:  Nustep level 5 x 12 min  Heel Raises x 15  Toe Taps x 2 min on 6" step     Step-ups x 15 on 6" step DNP  Standing hip flex/ext/abd x 15  Heel Cord stretch on wedge 3 x 30 sec  Sit to stand x 10  LAQ x 20  Pelvic Tilts x 15   Bridges x 10  Ball Squeezes x 15  QS x 2 min  SLR 2 x 10  SAQ x 3 min on small blue roll   Stairs x 5      Heri received the following manual therapy techniques:  were applied to the:  for  minutes including:       The patient received the following direct contact modalities after being cleared for contraindications:     The patient received the following supervised modalities after being cleared for contradictions:     Written Home Exercises Provided:   Pt demo good understanding of the education provided. Heri demonstrated good return demonstration of activities.     Education provided re:  Heri verbalized good understanding of education provided.   No spiritual or educational barriers to learning provided    Assessment     Patient did well with exercises; no increased symptoms noted; went back over hip precautions with patient-verbalizes and demonstrates understanding.   This is a 71 y.o. female referred to outpatient physical therapy and presents " with a medical diagnosis of Right hip fracture - s/p hemiarthroplasty, and presents to PT with Right hip pain, right LE weakness, decreased functional mobility and endurance; decreased LE function and demonstrates limitations as described in the problem list. Pt prognosis is Excellent. Pt will continue to benefit from skilled outpatient physical therapy to address the deficits listed in the problem list, provide pt/family education and to maximize pt's level of independence in the home and community environment.     GOALS:  Short Term Goals: 3 weeks  Pain:decrease pain to less than 3/10 in right hip with daily activities   Gait: able to walk at community distances without use of AD   Demonstrate compliance with initial exercise program     Long Term Goals: 6 weeks     Pain: Decrease pain to 0/10 to allow for improvement in daily activities   Strength: Improve strength in right Hip to 5/5 for improved LE stability  ROM: Improve ROM to 90% of normal limits   Functional scale: Improve score on FOTO/Hip  to 24% limitation   Lifting: Able to lift groceries from cart/car and carry into house without increased pain/compensation   Walking: Increase walking duration to 20 mins without pain  Postures: Increase sitting and/or standing duration to 60 mins without pain to allow her to return to work   Transfers: Perform all transfers without increased pain or limitation  Exercise: demonstrate independence with home exercise program to maintain gains made in therapy.          Plan     Continue with established Plan of Care towards PT goals.    Therapist: Bright Rawls, PTA  2/24/2022

## 2022-02-28 ENCOUNTER — CLINICAL SUPPORT (OUTPATIENT)
Dept: REHABILITATION | Facility: HOSPITAL | Age: 72
End: 2022-02-28
Attending: ORTHOPAEDIC SURGERY
Payer: MEDICARE

## 2022-02-28 DIAGNOSIS — G89.18 ACUTE POSTOPERATIVE PAIN OF RIGHT HIP: Primary | ICD-10-CM

## 2022-02-28 DIAGNOSIS — Z74.09 DECREASED FUNCTIONAL MOBILITY AND ENDURANCE: ICD-10-CM

## 2022-02-28 DIAGNOSIS — M25.551 ACUTE POSTOPERATIVE PAIN OF RIGHT HIP: Primary | ICD-10-CM

## 2022-02-28 PROCEDURE — 97116 GAIT TRAINING THERAPY: CPT | Mod: PN

## 2022-02-28 PROCEDURE — 97110 THERAPEUTIC EXERCISES: CPT | Mod: PN

## 2022-02-28 NOTE — PROGRESS NOTES
"                                                    Physical Therapy Daily Note     Name: Heri GARCIA [facility] Number: [id number]  Diagnosis:   Encounter Diagnoses   Name Primary?    Acute postoperative pain of right hip Yes    Decreased functional mobility and endurance      Physician: Dontae Lopez MD  Precautions: Standard; Hemiarthroplasty Right hip   Visit #: 7 of 12  PTA Visit #: 5  Time In: 2:35 PM  Time Out:   3:30  PM    Subjective     Pt reports:  "I'm doing ok, I still get some pain in my hip/leg every now and then, but overall I'm doing better.  I know I need to work on my walking, I still limp.   Pain Scale: Heri rates pain on a scale of 0-10 to be 2-3 currently.    Objective     Heri received individual therapeutic exercises to develop strength, endurance, ROM and flexibility for 40 minutes including:  Nustep level 5 x 12 min  Heel Raises x 15 - single leg (R)   Toe Taps x 2 min on 6" step     Step-ups x 15 on 6" step  Lateral step-up 6" Right x 20   Standing hip flex/ext/abd x 15 - twisted blue band   Heel Cord stretch on wedge 3 x 30 sec  Sit to stand x 10  LAQ x 20  Pelvic Tilts x 15   Bridges x 10  Ball Squeezes x 15  QS x 2 min  SLR 2 x 10  SAQ x 3 min on small blue roll   Stairs x 7 - single railing; one foot per step up/down     Gait: made several laps around the gym - about 10 - verbal cueing for heel to toe progression on RLE with increased hip flexion to extension of Right leg during gait cycle as opposed to minimal hip movement and simply knee flexion/extension. Camryn is able to ambulate well without use of cane,     Practiced lifting/picking things up from floor - she is able to demonstrate golfer's lift very well - not compromising her right hip at all. Discussed putting on her shoes - is able to slip her foot into them, can manage socks indep.     Completed FOTO update: indicated increased limitation from 37% to 40% even though patient is progressing well, has achieved some " of her goals and progressing toward all of the others.  Will repeat FOTO again in a couple of visits.       Written Home Exercises Provided:   Pt demo good understanding of the education provided. Heri demonstrated good return demonstration of activities.     Education provided re:  Heri verbalized good understanding of education provided.   No spiritual or educational barriers to learning provided    Assessment     Patient did well with exercises; no increased symptoms noted; Patient is progressing very well toward all of her goals, wants to be more independent at home, daughter is a little bit over protective per patient.   This is a 71 y.o. female referred to outpatient physical therapy and presents with a medical diagnosis of Right hip fracture - s/p hemiarthroplasty, and presents to PT with Right hip pain, right LE weakness, decreased functional mobility and endurance; decreased LE function and demonstrates limitations as described in the problem list. Pt prognosis is Excellent. Pt will continue to benefit from skilled outpatient physical therapy to address the deficits listed in the problem list, provide pt/family education and to maximize pt's level of independence in the home and community environment.     GOALS:  Short Term Goals: 3 weeks  Pain:decrease pain to less than 3/10 in right hip with daily activities   Gait: able to walk at community distances without use of AD   Demonstrate compliance with initial exercise program     Long Term Goals: 6 weeks     Pain: Decrease pain to 0/10 to allow for improvement in daily activities   Strength: Improve strength in right Hip to 5/5 for improved LE stability > Progressing.   ROM: Improve ROM to 90% of normal limits  > Progressing   Functional scale: Improve score on FOTO/Hip  to 24% limitation   Lifting: Able to lift groceries from cart/car and carry into house without increased pain/compensation   Walking: Increase walking duration to 20 mins without pain >  walking around yard, store, at least 20 mins.   Postures: Increase sitting and/or standing duration to 60 mins without pain to allow her to return to work > Progressing, able to sit and watch in hour TV program, Standing: about an hour, hour and 1/2 to prepare food.   Transfers: Perform all transfers without increased pain or limitation > MET   Exercise: demonstrate independence with home exercise program to maintain gains made in therapy. > Progressing.         Plan     Continue with established Plan of Care towards PT goals. Increase level of exercise as tolerated.     Therapist: Airam Mata, PT  2/28/2022

## 2022-03-03 ENCOUNTER — CLINICAL SUPPORT (OUTPATIENT)
Dept: REHABILITATION | Facility: HOSPITAL | Age: 72
End: 2022-03-03
Attending: ORTHOPAEDIC SURGERY
Payer: MEDICARE

## 2022-03-03 DIAGNOSIS — Z74.09 DECREASED FUNCTIONAL MOBILITY AND ENDURANCE: ICD-10-CM

## 2022-03-03 DIAGNOSIS — M25.551 ACUTE POSTOPERATIVE PAIN OF RIGHT HIP: Primary | ICD-10-CM

## 2022-03-03 DIAGNOSIS — G89.18 ACUTE POSTOPERATIVE PAIN OF RIGHT HIP: Primary | ICD-10-CM

## 2022-03-03 PROCEDURE — 97110 THERAPEUTIC EXERCISES: CPT | Mod: PN,CQ

## 2022-03-03 NOTE — PROGRESS NOTES
"                                                    Physical Therapy Daily Note     Name: Heri GARCIA [facility]  Clinic Number: [id number]  Diagnosis:   Encounter Diagnoses   Name Primary?    Acute postoperative pain of right hip Yes    Decreased functional mobility and endurance      Physician: Dontae Lopez MD  Precautions: Standard; Hemiarthroplasty Right hip   Visit #: 9 of 12  PTA Visit #: 1  Time In: 4:00 PM  Time Out:   4:40  PM    Subjective     Pt reports:  That the pain ranges from 0-5 (right knee) and seems to be related to the way she sleeps.  Pain Scale: Heri rates pain on a scale of 0-10 to be 0 currently.    Objective     Heri received individual therapeutic exercises to develop strength, endurance, ROM and flexibility for 40 minutes including:  Nustep level 5 x 10 min  Heel Raises x 15 - single leg (R)   Toe Taps x 2 min on 6" step     Step-ups 2 x 10 on 6" step  Lateral step-up 6" Right x 20   Standing hip flex/ext/abd x 10 - twisted blue band   Heel Cord stretch on wedge 3 x 30 sec  Sit to stand x 10  LAQ x 20  Pelvic Tilts x 15 DNP  Bridges x 10  Ball Squeezes x 1 min  QS x 2 min dnp  SLR 2 x 10  SAQ x 3 min on small blue roll dnp  Stairs x 7 - single railing; one foot per step up/down         Written Home Exercises Provided:   Pt demo good understanding of the education provided. Heri demonstrated good return demonstration of activities.     Education provided re:  Heri verbalized good understanding of education provided.   No spiritual or educational barriers to learning provided    Assessment     Patient did well with exercises; no increased symptoms noted; Patient is progressing in gait sequence but requires cues and practice.  This is a 71 y.o. female referred to outpatient physical therapy and presents with a medical diagnosis of Right hip fracture - s/p hemiarthroplasty, and presents to PT with Right hip pain, right LE weakness, decreased functional mobility and endurance; decreased " LE function and demonstrates limitations as described in the problem list. Pt prognosis is Excellent. Pt will continue to benefit from skilled outpatient physical therapy to address the deficits listed in the problem list, provide pt/family education and to maximize pt's level of independence in the home and community environment.     GOALS:  Short Term Goals: 3 weeks  Pain:decrease pain to less than 3/10 in right hip with daily activities   Gait: able to walk at community distances without use of AD   Demonstrate compliance with initial exercise program     Long Term Goals: 6 weeks     Pain: Decrease pain to 0/10 to allow for improvement in daily activities   Strength: Improve strength in right Hip to 5/5 for improved LE stability > Progressing.   ROM: Improve ROM to 90% of normal limits  > Progressing   Functional scale: Improve score on FOTO/Hip  to 24% limitation   Lifting: Able to lift groceries from cart/car and carry into house without increased pain/compensation   Walking: Increase walking duration to 20 mins without pain > walking around yard, store, at least 20 mins.   Postures: Increase sitting and/or standing duration to 60 mins without pain to allow her to return to work > Progressing, able to sit and watch in hour TV program, Standing: about an hour, hour and 1/2 to prepare food.   Transfers: Perform all transfers without increased pain or limitation > MET   Exercise: demonstrate independence with home exercise program to maintain gains made in therapy. > Progressing.         Plan   Focus on gait training to eliminate deviation.  Continue with established Plan of Care towards PT goals. Increase level of exercise as tolerated.     Therapist: Bright Rawls, PTA  3/3/2022

## 2022-03-08 ENCOUNTER — CLINICAL SUPPORT (OUTPATIENT)
Dept: REHABILITATION | Facility: HOSPITAL | Age: 72
End: 2022-03-08
Attending: ORTHOPAEDIC SURGERY
Payer: MEDICARE

## 2022-03-08 DIAGNOSIS — G89.18 ACUTE POSTOPERATIVE PAIN OF RIGHT HIP: Primary | ICD-10-CM

## 2022-03-08 DIAGNOSIS — Z74.09 DECREASED FUNCTIONAL MOBILITY AND ENDURANCE: ICD-10-CM

## 2022-03-08 DIAGNOSIS — M25.551 ACUTE POSTOPERATIVE PAIN OF RIGHT HIP: Primary | ICD-10-CM

## 2022-03-08 PROCEDURE — 97110 THERAPEUTIC EXERCISES: CPT | Mod: PN,CQ

## 2022-03-08 NOTE — PROGRESS NOTES
"                                                    Physical Therapy Daily Note     Name: Heri GARCIA [facility]  Clinic Number: [id number]  Diagnosis:   Encounter Diagnoses   Name Primary?    Acute postoperative pain of right hip Yes    Decreased functional mobility and endurance      Physician: Dontae Lopez MD  Precautions: Standard; Hemiarthroplasty Right hip   Visit #: 10 of 12  PTA Visit #: 2  Time In: 3:45 PM  Time Out:   4:25  PM    Subjective     Pt reports: doing good.  No significant pain noted.  Pain Scale: Heri rates pain on a scale of 0-10 to be 0 currently.    Objective     Heri received individual therapeutic exercises to develop strength, endurance, ROM and flexibility for 40 minutes including:  Nustep level 5 x 12 min  Heel Raises x 15 - single leg (R)   Toe Taps x 2 min on 6" step     Step-ups 2 x 10 on 6" step  Lateral step-up 6" Right x 20   Standing hip flex/ext/abd x 10 - twisted blue band   Heel Cord stretch on wedge 3 x 30 sec  Sit to stand x 10  LAQ x 20  Pelvic Tilts x 15 DNP  Bridges x 10  Ball Squeezes x 1 min  QS x 2 min dnp  SLR 2 x 10  SAQ x 3 min on small blue roll dnp  Stairs x 7 - single railing; one foot per step up/down         Written Home Exercises Provided:   Pt demo good understanding of the education provided. Heri demonstrated good return demonstration of activities.     Education provided re:  Heri verbalized good understanding of education provided.   No spiritual or educational barriers to learning provided    Assessment     Patient did well with exercise and is walking without her cane.  She is stable and her gait patterns are improving.  This is a 71 y.o. female referred to outpatient physical therapy and presents with a medical diagnosis of Right hip fracture - s/p hemiarthroplasty, and presents to PT with Right hip pain, right LE weakness, decreased functional mobility and endurance; decreased LE function and demonstrates limitations as described in the problem " list. Pt prognosis is Excellent. Pt will continue to benefit from skilled outpatient physical therapy to address the deficits listed in the problem list, provide pt/family education and to maximize pt's level of independence in the home and community environment.     GOALS:  Short Term Goals: 3 weeks  Pain:decrease pain to less than 3/10 in right hip with daily activities   Gait: able to walk at community distances without use of AD   Demonstrate compliance with initial exercise program     Long Term Goals: 6 weeks     Pain: Decrease pain to 0/10 to allow for improvement in daily activities   Strength: Improve strength in right Hip to 5/5 for improved LE stability > Progressing.   ROM: Improve ROM to 90% of normal limits  > Progressing   Functional scale: Improve score on FOTO/Hip  to 24% limitation   Lifting: Able to lift groceries from cart/car and carry into house without increased pain/compensation   Walking: Increase walking duration to 20 mins without pain > walking around yard, store, at least 20 mins.   Postures: Increase sitting and/or standing duration to 60 mins without pain to allow her to return to work > Progressing, able to sit and watch in hour TV program, Standing: about an hour, hour and 1/2 to prepare food.   Transfers: Perform all transfers without increased pain or limitation > MET   Exercise: demonstrate independence with home exercise program to maintain gains made in therapy. > Progressing.         Plan   Focus on gait training to eliminate deviation.  Continue with established Plan of Care towards PT goals. Increase level of exercise as tolerated.     Therapist: Bright Rawls, PTA  3/8/2022

## 2022-03-09 DIAGNOSIS — Z87.81 S/P RIGHT HIP FRACTURE: Primary | ICD-10-CM

## 2022-03-10 ENCOUNTER — OFFICE VISIT (OUTPATIENT)
Dept: ORTHOPEDICS | Facility: CLINIC | Age: 72
End: 2022-03-10
Payer: MEDICARE

## 2022-03-10 ENCOUNTER — HOSPITAL ENCOUNTER (OUTPATIENT)
Dept: RADIOLOGY | Facility: HOSPITAL | Age: 72
Discharge: HOME | End: 2022-03-10
Attending: ORTHOPAEDIC SURGERY
Payer: MEDICARE

## 2022-03-10 ENCOUNTER — TELEPHONE (OUTPATIENT)
Dept: ORTHOPEDICS | Facility: CLINIC | Age: 72
End: 2022-03-10

## 2022-03-10 VITALS — HEIGHT: 60 IN | WEIGHT: 97 LBS | RESPIRATION RATE: 16 BRPM | BODY MASS INDEX: 19.04 KG/M2

## 2022-03-10 DIAGNOSIS — Z87.81 S/P RIGHT HIP FRACTURE: ICD-10-CM

## 2022-03-10 DIAGNOSIS — M25.551 RIGHT HIP PAIN: Primary | ICD-10-CM

## 2022-03-10 PROCEDURE — 99214 PR OFFICE/OUTPT VISIT, EST, LEVL IV, 30-39 MIN: ICD-10-PCS | Mod: S$PBB,,, | Performed by: ORTHOPAEDIC SURGERY

## 2022-03-10 PROCEDURE — 99212 OFFICE O/P EST SF 10 MIN: CPT | Mod: PBBFAC,PN | Performed by: ORTHOPAEDIC SURGERY

## 2022-03-10 PROCEDURE — 99999 PR PBB SHADOW E&M-EST. PATIENT-LVL II: ICD-10-PCS | Mod: PBBFAC,,, | Performed by: ORTHOPAEDIC SURGERY

## 2022-03-10 PROCEDURE — 99214 OFFICE O/P EST MOD 30 MIN: CPT | Mod: S$PBB,,, | Performed by: ORTHOPAEDIC SURGERY

## 2022-03-10 PROCEDURE — 73502 X-RAY EXAM HIP UNI 2-3 VIEWS: CPT | Mod: 26,RT,, | Performed by: RADIOLOGY

## 2022-03-10 PROCEDURE — 99999 PR PBB SHADOW E&M-EST. PATIENT-LVL II: CPT | Mod: PBBFAC,,, | Performed by: ORTHOPAEDIC SURGERY

## 2022-03-10 PROCEDURE — 73502 XR HIP WITH PELVIS WHEN PERFORMED, 2 OR 3  VIEWS RIGHT: ICD-10-PCS | Mod: 26,RT,, | Performed by: RADIOLOGY

## 2022-03-10 PROCEDURE — 73502 X-RAY EXAM HIP UNI 2-3 VIEWS: CPT | Mod: TC,PN,RT

## 2022-03-10 NOTE — PROGRESS NOTES
CC:  71-year-old female presents for evaluation of her right hip.  The patient had a right hip hemiarthroplasty done almost 4 months ago by Dr. Lopez.  He has unfortunately moved away from this area.  She presents for evaluation of her right hip.  She is having some intermittent pain in the right hip she rates as a 2 to 3/10.  She has been making progress with therapy although she does still have a limp and presents today ambulating with a cane.    Past Medical History:   Diagnosis Date    Anxiety     GERD (gastroesophageal reflux disease)        Past Surgical History:   Procedure Laterality Date    FRACTURE SURGERY      HEMIARTHROPLASTY OF HIP Right 2021    Procedure: HEMIARTHROPLASTY, HIP;  Surgeon: Dontae Lopez MD;  [facility];  Service: Orthopedics;  Laterality: Right;       Current Outpatient Medications on File Prior to Visit   Medication Sig Dispense Refill    alprazolam (XANAX XR) 1 MG Tb24 Take 0.5 mg by mouth once daily.      ALPRAZolam (XANAX) 0.5 MG tablet SMARTSI Tablet(s) By Mouth Every 12 Hours PRN      azelastine (ASTELIN) 137 mcg (0.1 %) nasal spray TAKE 2 PUFFS EACH NOSTRIL TWICE A DAY      cyproheptadine (PERIACTIN) 4 mg tablet Take 0.125 mg by mouth 4 (four) times daily before meals and nightly. DISSOLVE 1 TABLET ORALLY EVERY 4 HOURS BEFORE MEALS      famotidine (PEPCID) 40 MG tablet 40 mg.      fluticasone propionate (FLONASE) 50 mcg/actuation nasal spray SMARTSI Puff(s) Both Nares Twice Daily      HYDROcodone-acetaminophen (NORCO) 5-325 mg per tablet Take 1 tablet by mouth every 6 (six) hours as needed for Pain. 28 tablet 0    hyoscyamine (LEVSIN/SL) 0.125 mg Subl Place 0.125 mg under the tongue every 4 (four) hours as needed. Take sublingual before meals      lidocaine HCl 2% (XYLOCAINE) 2 % Soln Take 5 mLs by mouth every 4 (four) hours. 100 mL 0    omeprazole (PRILOSEC) 40 MG capsule Take 40 mg by mouth 2 (two) times daily.      rivaroxaban (XARELTO)  10 mg Tab Take 1 tablet (10 mg total) by mouth daily with dinner or evening meal. for 5 days 5 tablet 0     No current facility-administered medications on file prior to visit.       ROS:    Constitution: Denies chills, fever, and sweats.  HENT: Denies headaches or blurry vision.  Cardiovascular: Denies chest pain or irregular heart beat.  Respiratory: Denies cough or shortness of breath.  Gastrointestinal: Denies abdominal pain, nausea, or vomiting.  Genitourinary:  Denies urinary incontinence, bladder and kidney issues  Musculoskeletal:  Denies muscle cramps.  Positive for intermittent right hip pain and weakness  Neurological: Denies dizziness or focal weakness.  Psychiatric/Behavioral: Normal mental status.  Hematologic/Lymphatic: Denies bleeding problem or easy bruising/bleeding.  Skin: Denies rash or suspicious lesions.    Physical examination     Gen - No acute distress, well nourished, well groomed   Eyes - Extraoccular motions intact, pupils equally round and reactive to light and accommodation   ENT - normocephalic, atruamtic, oropharynx clear   Neck - Supple, no abnormal masses   Cardiovascular - regular rate and rhythm   Pulmonary - clear to auscultation bilaterally, no wheezes, ronchi, or rales   Abdomen - soft, non-tender, non-distended, positive bowel sounds   Psych - The patient is alert and oriented x3 with normal mood and affect    Examination of the Right Lower Extremity    Skin is intact throughout  Motor in intact EHL,FHL,TA,corry  +2 DP/PT  Sensation LT intact D/P/1st    Examination of the Right Hip    C-Sign negative  Logroll negative  Stenchfield negative    Pain with ROM negative    ROM:    Flexion   120  Extension   30  Abduction   45  Adduction   20  External Rotation 45  Internal Rotation 35    Flexion contracture negative    FADIR negative  FADER negative    Tenderness to palpation over lateral and posterolateral greater tochanter negative    X-ray images were examined and personally  interpreted by me.  Three views the right hip dated 03/10/2022 show a right hip hemiarthroplasty that appears well fixed and in good alignment.    Dx:  Status post right hip hemiarthroplasty, stable    Plan:  Continue to progress activity as tolerated.  We discussed returning to work.  I think that is reasonable with some restrictions.  Were going to give her restrictions of no stooping bending squatting or kneeling.  I will have her follow up in 6 weeks for re-evaluation.

## 2022-03-10 NOTE — TELEPHONE ENCOUNTER
----- Message from Marine Real sent at 3/10/2022  4:04 PM CST -----  Regarding: pt called  Name of Who is Calling: ROSALINE GERARDO [[id number]]      What is the request in detail: pt is requesting to speak with the nurse about her extended leave of absence that Dr Lopez  was suppose to send over to her job.  Pt is requesting that a letter be faxed to  her job [employer] fax number is [phone]  that  states she is still under Dr care  and can return to work  03- . Please advise       Can the clinic reply by [facility]: NO      What [facility] Back [facility]: [phone]

## 2022-03-11 ENCOUNTER — DOCUMENT SCAN (OUTPATIENT)
Dept: HOME HEALTH SERVICES | Facility: HOSPITAL | Age: 72
End: 2022-03-11
Payer: MEDICARE

## 2022-03-11 ENCOUNTER — TELEPHONE (OUTPATIENT)
Dept: ORTHOPEDICS | Facility: CLINIC | Age: 72
End: 2022-03-11

## 2022-03-11 NOTE — TELEPHONE ENCOUNTER
----- Message from Sunshine Odom sent at 3/11/2022  1:01 PM CST -----  RX Refill Request    Who Called: PT     Refill or New Rx: Refill     RX Name and Strength: HYDROcodone-acetaminophen (NORCO) 5-325 mg per tablet    How is the patient currently taking it? (ex. 1XDay):Take 1 tablet by mouth every 6 (six) hours as needed for Pain.    Is this a 30 day or 90 day RX:    Preferred Pharmacy with phone number:  [facility] [place] - [address] (Ph: [phone])      Local or Mail Order:Local    Ordering Provider: John Grant Call Back Number:[phone]

## 2022-03-15 ENCOUNTER — CLINICAL SUPPORT (OUTPATIENT)
Dept: REHABILITATION | Facility: HOSPITAL | Age: 72
End: 2022-03-15
Attending: ORTHOPAEDIC SURGERY
Payer: MEDICARE

## 2022-03-15 DIAGNOSIS — Z74.09 DECREASED FUNCTIONAL MOBILITY AND ENDURANCE: ICD-10-CM

## 2022-03-15 DIAGNOSIS — G89.18 ACUTE POSTOPERATIVE PAIN OF RIGHT HIP: Primary | ICD-10-CM

## 2022-03-15 DIAGNOSIS — M25.551 ACUTE POSTOPERATIVE PAIN OF RIGHT HIP: Primary | ICD-10-CM

## 2022-03-15 PROCEDURE — 97110 THERAPEUTIC EXERCISES: CPT | Mod: PN

## 2022-03-15 NOTE — PROGRESS NOTES
"                                                    Physical Therapy Daily Note     Name: Heri GARCIA [facility]  Clinic Number: [id number]  Diagnosis:   Encounter Diagnoses   Name Primary?    Acute postoperative pain of right hip Yes    Decreased functional mobility and endurance      Physician: Dontae Lopez MD  Precautions: Standard; Hemiarthroplasty Right hip   Visit #: 11 of 12  PTA Visit #: 2  Time In: 3:15 PM  Time Out:   4:30 pm     Subjective     Pt reports: doing good.  No significant pain noted, but still has some discomfort in her right groin with flexion   Pain Scale: Heri rates pain on a scale of 0-10 to be 0 currently.    Objective     Heri received individual therapeutic exercises to develop strength, endurance, ROM and flexibility for 40 minutes including:  Nustep level 5 x 12 min  Heel Raises x 15 - single leg (R)   Toe Taps x 2 min on 8" step     Step-ups 2 x 10 on 6" step  Lateral step-up 6" Right x 20   Standing hip flex/ext/abd x 10 - twisted blue band   Heel Cord stretch on wedge 3 x 30 sec  Sit to stand x 10  LAQ x 20  S/L hip abduction 10 x 2 - bilateral   Bridges x 10  Ball Squeezes x 1 min  QS x 2 min dnp  SLR 2 x 10  SAQ x 3 min on small blue roll 4#   Stairs x 7 - single railing; one foot per step up/down         Written Home Exercises Provided:   Pt demo good understanding of the education provided. Heri demonstrated good return demonstration of activities.     Education provided re:  Heri verbalized good understanding of education provided.   No spiritual or educational barriers to learning provided    Assessment     Patient did well with exercise and is walking without her cane.  She is stable and her gait patterns are improving with cueing; needs to practice walking with improved patterns at home;  less groin pain noted following exercise today.   This is a 71 y.o. female referred to outpatient physical therapy and presents with a medical diagnosis of Right hip fracture - s/p " hemiarthroplasty, and presents to PT with Right hip pain, right LE weakness, decreased functional mobility and endurance; decreased LE function and demonstrates limitations as described in the problem list. Pt prognosis is Excellent. Pt will continue to benefit from skilled outpatient physical therapy to address the deficits listed in the problem list, provide pt/family education and to maximize pt's level of independence in the home and community environment.     GOALS:  Short Term Goals: 3 weeks  Pain:decrease pain to less than 3/10 in right hip with daily activities > MET  Gait: able to walk at community distances without use of AD > MET   Demonstrate compliance with initial exercise program > MET      Long Term Goals: 6 weeks     Pain: Decrease pain to 0/10 to allow for improvement in daily activities > Progressing - pain is between 0-1/10   Strength: Improve strength in right Hip to 5/5 for improved LE stability > Progressing.   ROM: Improve ROM to 90% of normal limits  > Progressing   Functional scale: Improve score on FOTO/Hip  to 24% limitation   Lifting: Able to lift groceries from cart/car and carry into house without increased pain/compensation  > Ongoing   Walking: Increase walking duration to 20 mins without pain > walking around yard, store, at least 20 mins.   Postures: Increase sitting and/or standing duration to 60 mins without pain to allow her to return to work > Progressing, able to sit and watch in hour TV program, Standing: about an hour, hour and 1/2 to prepare food.   Transfers: Perform all transfers without increased pain or limitation > MET   Exercise: demonstrate independence with home exercise program to maintain gains made in therapy. > Progressing.         Plan   Focus on gait training to eliminate deviation.  Continue with established Plan of Care towards PT goals. Increase level of exercise as tolerated.  FOTO next visit.  Re-assess for continued PT     Therapist: Airam Mata,  PT  3/15/2022

## 2022-03-16 DIAGNOSIS — Z87.81 S/P RIGHT HIP FRACTURE: ICD-10-CM

## 2022-03-16 DIAGNOSIS — Z96.649 STATUS POST HIP HEMIARTHROPLASTY: ICD-10-CM

## 2022-03-16 DIAGNOSIS — Z87.81 S/P RIGHT HIP FRACTURE: Primary | ICD-10-CM

## 2022-03-16 NOTE — TELEPHONE ENCOUNTER
----- Message from Uriel Lawrence MA sent at 3/16/2022 12:58 PM CDT -----  Contact: Heri Jansen  Patient needs a paper stating that she was under Dr. Lopez care and had order to be off of work up until February 7, 2022, this needs to be faxed to [employer] fax number is [phone]. , Also She would like to know if she could get a refill on her pain medication, and she also would like to extend her Physical Therapy. Please call patient back at [phone]

## 2022-03-17 ENCOUNTER — CLINICAL SUPPORT (OUTPATIENT)
Dept: REHABILITATION | Facility: HOSPITAL | Age: 72
End: 2022-03-17
Attending: ORTHOPAEDIC SURGERY
Payer: MEDICARE

## 2022-03-17 DIAGNOSIS — M25.551 ACUTE POSTOPERATIVE PAIN OF RIGHT HIP: Primary | ICD-10-CM

## 2022-03-17 DIAGNOSIS — G89.18 ACUTE POSTOPERATIVE PAIN OF RIGHT HIP: Primary | ICD-10-CM

## 2022-03-17 DIAGNOSIS — Z74.09 DECREASED FUNCTIONAL MOBILITY AND ENDURANCE: ICD-10-CM

## 2022-03-17 PROCEDURE — 97110 THERAPEUTIC EXERCISES: CPT | Mod: PN

## 2022-03-17 RX ORDER — HYDROCODONE BITARTRATE AND ACETAMINOPHEN 5; 325 MG/1; MG/1
1 TABLET ORAL EVERY 6 HOURS PRN
Qty: 28 TABLET | Refills: 0 | Status: SHIPPED | OUTPATIENT
Start: 2022-03-17

## 2022-03-17 NOTE — PROGRESS NOTES
"                                                    Physical Therapy Daily Note     Name: Heri GARCIA [facility]  Clinic Number: [id number]  Diagnosis:   Encounter Diagnoses   Name Primary?    Acute postoperative pain of right hip Yes    Decreased functional mobility and endurance      Physician: Dontae Lopez MD  Precautions: Standard; Hemiarthroplasty Right hip   Visit #: 12 of 12  PTA Visit #: 2  Time In: 3:15 PM  Time Out:   4:15 pm      Subjective     Pt reports: Camryn reports that she is doing well; plans on going back to work on Monday;  Having less right groin pain when getting up from sitting.   Pain Scale: Heri rates pain on a scale of 0-10 to be 0 currently.    Objective     Heri received individual therapeutic exercises to develop strength, endurance, ROM and flexibility for 40 minutes including:  Nustep level 5 x 12 min  Heel Raises x 15 - single leg (R)   Toe Taps x 2 min on 8" step     Step-ups 2 x 10 on 6" step  Lateral step-up 6" Right x 20   Standing hip flex/ext/abd x 10 - twisted blue band   Heel Cord stretch on wedge 3 x 30 sec  Sit to stand x 10  LAQ x 20  S/L hip abduction 10 x 2 - bilateral - 1#  Clams: yellow T-band around thighs 2 x 10   Bridges x 10  Ball Squeezes x 1 min  QS x 2 min dnp  SLR 2 x 10 - added 1#   SAQ x 3 min on small blue roll 4#   Stairs x 7 - single railing; one foot per step up/down         Written Home Exercises Provided:   Pt demo good understanding of the education provided. Heri demonstrated good return demonstration of activities.     Education provided re:  Heri verbalized good understanding of education provided.   No spiritual or educational barriers to learning provided    Assessment     Patient did well with increased resistive exercises today; Still requires cueing for gait to prevent antalgic pattern, stated that she is trying to work on it at home. Has new orders from Dr. Yanez for continued therapy - she is returning to work on Monday - parttime " initially.    This is a 71 y.o. female referred to outpatient physical therapy and presents with a medical diagnosis of Right hip fracture - s/p hemiarthroplasty, and presents to PT with Right hip pain, right LE weakness, decreased functional mobility and endurance; decreased LE function and demonstrates limitations as described in the problem list. Pt prognosis is Excellent. Pt will continue to benefit from skilled outpatient physical therapy to address the deficits listed in the problem list, provide pt/family education and to maximize pt's level of independence in the home and community environment.     GOALS:  Short Term Goals: 3 weeks  Pain:decrease pain to less than 3/10 in right hip with daily activities > MET  Gait: able to walk at community distances without use of AD > MET   Demonstrate compliance with initial exercise program > MET      Long Term Goals: 6 weeks     Pain: Decrease pain to 0/10 to allow for improvement in daily activities > Progressing - pain is between 0-1/10   Strength: Improve strength in right Hip to 5/5 for improved LE stability > Progressing.   ROM: Improve ROM to 90% of normal limits  > Progressing   Functional scale: Improve score on FOTO/Hip  to 24% limitation   Lifting: Able to lift groceries from cart/car and carry into house without increased pain/compensation  > Ongoing   Walking: Increase walking duration to 20 mins without pain > walking around yard, store, at least 20 mins.   Postures: Increase sitting and/or standing duration to 60 mins without pain to allow her to return to work > Progressing, able to sit and watch in hour TV program, Standing: about an hour, hour and 1/2 to prepare food.   Transfers: Perform all transfers without increased pain or limitation > MET   Exercise: demonstrate independence with home exercise program to maintain gains made in therapy. > Progressing.         Plan   Focus on gait training to eliminate deviation.  Continue with established Plan of  Care towards PT goals. Increase level of exercise as tolerated.     Therapist: Airam Mata, PT  3/17/2022

## 2022-03-22 ENCOUNTER — CLINICAL SUPPORT (OUTPATIENT)
Dept: REHABILITATION | Facility: HOSPITAL | Age: 72
End: 2022-03-22
Attending: ORTHOPAEDIC SURGERY
Payer: MEDICARE

## 2022-03-22 DIAGNOSIS — Z74.09 DECREASED FUNCTIONAL MOBILITY AND ENDURANCE: ICD-10-CM

## 2022-03-22 DIAGNOSIS — Z96.649 STATUS POST HIP HEMIARTHROPLASTY: ICD-10-CM

## 2022-03-22 DIAGNOSIS — M25.551 ACUTE POSTOPERATIVE PAIN OF RIGHT HIP: Primary | ICD-10-CM

## 2022-03-22 DIAGNOSIS — G89.18 ACUTE POSTOPERATIVE PAIN OF RIGHT HIP: Primary | ICD-10-CM

## 2022-03-22 DIAGNOSIS — Z87.81 S/P RIGHT HIP FRACTURE: ICD-10-CM

## 2022-03-22 PROCEDURE — 97110 THERAPEUTIC EXERCISES: CPT | Mod: PN,CQ

## 2022-03-22 NOTE — PROGRESS NOTES
"                                                    Physical Therapy Daily Note     Name: Heri GARCIA [facility] Number: [id number]  Diagnosis:   Encounter Diagnoses   Name Primary?    S/P right hip fracture     Status post hip hemiarthroplasty     Acute postoperative pain of right hip Yes    Decreased functional mobility and endurance      Physician: Christopher Yanez II, MD  Precautions: Standard; Hemiarthroplasty Right hip   Visit #: 12 of 18  PTA Visit #: 2  Time In: 4:00 PM  Time Out:   4:45 pm      Subjective     Pt reports: working for 4 hours yesterday and did pretty well.  Pain Scale: Heri rates pain on a scale of 0-10 to be 0 currently.    Objective     Heri received individual therapeutic exercises to develop strength, endurance, ROM and flexibility for 40 minutes including:  Nustep level 5 x 15 min  Heel Raises x 15 - single leg (R)   Toe Taps x 2 min on 8" step     Step-ups 2 x 10 on 6" step  Lateral step-up 6" Right x 20   Standing hip flex/ext/abd x 10 - twisted blue band   Heel Cord stretch on wedge 3 x 30 sec  Sit to stand x 10  LAQ x 20  S/L hip abduction 10 x 2 - bilateral - 1#  Clams: yellow T-band around thighs 2 x 10   Bridges x 10  Ball Squeezes x 1 min dnp  QS x 2 min dnp   SLR 2 x 10 - added 1#   SAQ x 3 min on small blue roll 4#   Stairs x 7 - single railing; one foot per step up/down dnp        Written Home Exercises Provided:   Pt demo good understanding of the education provided. Heri demonstrated good return demonstration of activities.     Education provided re:  Heri verbalized good understanding of education provided.   No spiritual or educational barriers to learning provided    Assessment     Patient did very well with her exercise and was able to increase her time on the Nustep  This is a 71 y.o. female referred to outpatient physical therapy and presents with a medical diagnosis of Right hip fracture - s/p hemiarthroplasty, and presents to PT with Right hip pain, right " LE weakness, decreased functional mobility and endurance; decreased LE function and demonstrates limitations as described in the problem list. Pt prognosis is Excellent. Pt will continue to benefit from skilled outpatient physical therapy to address the deficits listed in the problem list, provide pt/family education and to maximize pt's level of independence in the home and community environment.     GOALS:  Short Term Goals: 3 weeks  Pain:decrease pain to less than 3/10 in right hip with daily activities > MET  Gait: able to walk at community distances without use of AD > MET   Demonstrate compliance with initial exercise program > MET      Long Term Goals: 6 weeks     Pain: Decrease pain to 0/10 to allow for improvement in daily activities > Progressing - pain is between 0-1/10   Strength: Improve strength in right Hip to 5/5 for improved LE stability > Progressing.   ROM: Improve ROM to 90% of normal limits  > Progressing   Functional scale: Improve score on FOTO/Hip  to 24% limitation   Lifting: Able to lift groceries from cart/car and carry into house without increased pain/compensation  > Ongoing   Walking: Increase walking duration to 20 mins without pain > walking around yard, store, at least 20 mins.   Postures: Increase sitting and/or standing duration to 60 mins without pain to allow her to return to work > Progressing, able to sit and watch in hour TV program, Standing: about an hour, hour and 1/2 to prepare food.   Transfers: Perform all transfers without increased pain or limitation > MET   Exercise: demonstrate independence with home exercise program to maintain gains made in therapy. > Progressing.         Plan   Focus on gait training to eliminate deviation.  Continue with established Plan of Care towards PT goals. Increase level of exercise as tolerated.     Therapist: Bright Rawls, PTA  3/22/2022

## 2022-03-24 ENCOUNTER — CLINICAL SUPPORT (OUTPATIENT)
Dept: REHABILITATION | Facility: HOSPITAL | Age: 72
End: 2022-03-24
Attending: ORTHOPAEDIC SURGERY
Payer: MEDICARE

## 2022-03-24 DIAGNOSIS — G89.18 ACUTE POSTOPERATIVE PAIN OF RIGHT HIP: ICD-10-CM

## 2022-03-24 DIAGNOSIS — M25.551 ACUTE POSTOPERATIVE PAIN OF RIGHT HIP: ICD-10-CM

## 2022-03-24 DIAGNOSIS — Z74.09 DECREASED FUNCTIONAL MOBILITY AND ENDURANCE: Primary | ICD-10-CM

## 2022-03-24 PROCEDURE — 97110 THERAPEUTIC EXERCISES: CPT | Mod: PN,CQ

## 2022-03-24 NOTE — PROGRESS NOTES
"                                                    Physical Therapy Daily Note     Name: Heri GARCIA [facility] Number: [id number]  Diagnosis:   Encounter Diagnoses   Name Primary?    Acute postoperative pain of right hip     Decreased functional mobility and endurance Yes     Physician: Christopher Yanez II, MD  Precautions: Standard; Hemiarthroplasty Right hip   Visit #: 13 of 18  PTA Visit #: 3  Time In: 2:55 PM  Time Out:  3:45 PM      Subjective     Pt reports: No new c/o's.  Pain Scale: Heri rates pain on a scale of 0-10 to be 0 currently.    Objective     Heri received individual therapeutic exercises to develop strength, endurance, ROM and flexibility for 40 minutes including:  Nustep level 5 x 15 min  Heel Raises x 15 - single leg (R)   Toe Taps x 2 min on 8" step     Step-ups 2 x 10 on 6" step  Lateral step-up 6" Right x 20   Standing hip flex/ext/abd x 10 - twisted blue band   Heel Cord stretch on wedge 3 x 30 sec  Sit to stand x 10  LAQ x 20  S/L hip abduction 10 x 2 - bilateral - 1#  Clams: yellow T-band around thighs 2 x 10   Bridges x 10  Ball Squeezes x 2 min   QS x 2 min   SLR 2 x 10 1#   SAQ x 3 min on small blue roll 4#   Stairs x 7 - single railing; one foot per step up/down dnp        Written Home Exercises Provided:   Pt demo good understanding of the education provided. Heri demonstrated good return demonstration of activities.     Education provided re:  Heri verbalized good understanding of education provided.   No spiritual or educational barriers to learning provided    Assessment     Patient did very well with her exercise and was able to increase her time on the Nustep  This is a 71 y.o. female referred to outpatient physical therapy and presents with a medical diagnosis of Right hip fracture - s/p hemiarthroplasty, and presents to PT with Right hip pain, right LE weakness, decreased functional mobility and endurance; decreased LE function and demonstrates limitations as " described in the problem list. Pt prognosis is Excellent. Pt will continue to benefit from skilled outpatient physical therapy to address the deficits listed in the problem list, provide pt/family education and to maximize pt's level of independence in the home and community environment.     GOALS:  Short Term Goals: 3 weeks  Pain:decrease pain to less than 3/10 in right hip with daily activities > MET  Gait: able to walk at community distances without use of AD > MET   Demonstrate compliance with initial exercise program > MET      Long Term Goals: 6 weeks     Pain: Decrease pain to 0/10 to allow for improvement in daily activities > Progressing - pain is between 0-1/10   Strength: Improve strength in right Hip to 5/5 for improved LE stability > Progressing.   ROM: Improve ROM to 90% of normal limits  > Progressing   Functional scale: Improve score on FOTO/Hip  to 24% limitation   Lifting: Able to lift groceries from cart/car and carry into house without increased pain/compensation  > Ongoing   Walking: Increase walking duration to 20 mins without pain > walking around yard, store, at least 20 mins.   Postures: Increase sitting and/or standing duration to 60 mins without pain to allow her to return to work > Progressing, able to sit and watch in hour TV program, Standing: about an hour, hour and 1/2 to prepare food.   Transfers: Perform all transfers without increased pain or limitation > MET   Exercise: demonstrate independence with home exercise program to maintain gains made in therapy. > Progressing.         Plan   Focus on gait training to eliminate deviation.  Continue with established Plan of Care towards PT goals. Increase level of exercise as tolerated.     Therapist: Jonathan Favre, PTA  3/24/2022

## 2022-03-29 ENCOUNTER — CLINICAL SUPPORT (OUTPATIENT)
Dept: REHABILITATION | Facility: HOSPITAL | Age: 72
End: 2022-03-29
Attending: ORTHOPAEDIC SURGERY
Payer: MEDICARE

## 2022-03-29 DIAGNOSIS — G89.18 ACUTE POSTOPERATIVE PAIN OF RIGHT HIP: Primary | ICD-10-CM

## 2022-03-29 DIAGNOSIS — Z74.09 DECREASED FUNCTIONAL MOBILITY AND ENDURANCE: ICD-10-CM

## 2022-03-29 DIAGNOSIS — M25.551 ACUTE POSTOPERATIVE PAIN OF RIGHT HIP: Primary | ICD-10-CM

## 2022-03-29 PROCEDURE — 97110 THERAPEUTIC EXERCISES: CPT | Mod: PN,CQ

## 2022-03-29 NOTE — PROGRESS NOTES
"                                                    Physical Therapy Daily Note     Name: Heri GARCIA [facility]  Clinic Number: [id number]  Diagnosis:   Encounter Diagnoses   Name Primary?    Acute postoperative pain of right hip Yes    Decreased functional mobility and endurance      Physician: Christopher Yanez II, MD  Precautions: Standard; Hemiarthroplasty Right hip   Visit #: 15 of 18  PTA Visit #: 4  Time In: 3:35 PM  Time Out:  4:15 PM      Subjective     Pt reports: getting sore after working two part shifts and forgetting to use a pillow at hip last night.  Part of the pain is in her low back.  Pain Scale: Heri rates pain on a scale of 0-10 to be 8 currently.  Lumbar and right hip  Objective     Heri received individual therapeutic exercises to develop strength, endurance, ROM and flexibility for 40 minutes including:  Nustep level 1 x 15 min  Heel Raises x 15 - single leg (R)   Toe Taps x 2 min on 8" step     Step-ups 2 x 10 on 6" step  Lateral step-up 6" Right x 20   Standing hip flex/ext/abd x 10 - twisted blue band   Heel Cord stretch on wedge 3 x 30 sec  Sit to stand x 10  LAQ x 20 DNP  S/L hip abduction 10 x 2 - bilateral - 1# DNP  Clams: yellow T-band around thighs 2 x 10   Bridges x 10  Ball Squeezes x 2 min   QS x 2 min  dnp  SLR 2 x 10 1# dnp  SAQ x 3 min on small grey roll   Stairs x 7 - single railing; one foot per step up/down dnp    Added a 1/4 inch heel lift to the left leg to even up the pelvis    Written Home Exercises Provided:   Pt demo good understanding of the education provided. Heri demonstrated good return demonstration of activities.     Education provided re:  Heri verbalized good understanding of education provided.   No spiritual or educational barriers to learning provided    Assessment     Patient did very well with her exercise.  Added a heel lift to her left shoe in an attempt to align her pelvis.      This is a 71 y.o. female referred to outpatient physical therapy and " presents with a medical diagnosis of Right hip fracture - s/p hemiarthroplasty, and presents to PT with Right hip pain, right LE weakness, decreased functional mobility and endurance; decreased LE function and demonstrates limitations as described in the problem list. Pt prognosis is Excellent. Pt will continue to benefit from skilled outpatient physical therapy to address the deficits listed in the problem list, provide pt/family education and to maximize pt's level of independence in the home and community environment.     GOALS:  Short Term Goals: 3 weeks  Pain:decrease pain to less than 3/10 in right hip with daily activities > MET  Gait: able to walk at community distances without use of AD > MET   Demonstrate compliance with initial exercise program > MET      Long Term Goals: 6 weeks     Pain: Decrease pain to 0/10 to allow for improvement in daily activities > Progressing - pain is between 0-1/10   Strength: Improve strength in right Hip to 5/5 for improved LE stability > Progressing.   ROM: Improve ROM to 90% of normal limits  > Progressing   Functional scale: Improve score on FOTO/Hip  to 24% limitation   Lifting: Able to lift groceries from cart/car and carry into house without increased pain/compensation  > Ongoing   Walking: Increase walking duration to 20 mins without pain > walking around yard, store, at least 20 mins.   Postures: Increase sitting and/or standing duration to 60 mins without pain to allow her to return to work > Progressing, able to sit and watch in hour TV program, Standing: about an hour, hour and 1/2 to prepare food.   Transfers: Perform all transfers without increased pain or limitation > MET   Exercise: demonstrate independence with home exercise program to maintain gains made in therapy. > Progressing.         Plan   Focus on gait training to eliminate deviation.  Continue with established Plan of Care towards PT goals. Increase level of exercise as tolerated.     Therapist:  Bright Rawls, PTA  3/29/2022

## 2022-03-31 ENCOUNTER — CLINICAL SUPPORT (OUTPATIENT)
Dept: REHABILITATION | Facility: HOSPITAL | Age: 72
End: 2022-03-31
Attending: ORTHOPAEDIC SURGERY
Payer: MEDICARE

## 2022-03-31 DIAGNOSIS — G89.18 ACUTE POSTOPERATIVE PAIN OF RIGHT HIP: Primary | ICD-10-CM

## 2022-03-31 DIAGNOSIS — M25.551 ACUTE POSTOPERATIVE PAIN OF RIGHT HIP: Primary | ICD-10-CM

## 2022-03-31 DIAGNOSIS — Z74.09 DECREASED FUNCTIONAL MOBILITY AND ENDURANCE: ICD-10-CM

## 2022-03-31 PROCEDURE — 97110 THERAPEUTIC EXERCISES: CPT | Mod: PN

## 2022-03-31 NOTE — PROGRESS NOTES
"                                                    Physical Therapy Daily Note     Name: Heri GARCIA [facility] Number: [id number]  Diagnosis:   Encounter Diagnoses   Name Primary?    Acute postoperative pain of right hip Yes    Decreased functional mobility and endurance      Physician: Christopher Yanez II, MD  Precautions: Standard; Hemiarthroplasty Right hip   Visit #: 16 of 18  PTA Visit #: 4  Time In:    4:05 PM  Time Out:  5:15 PM      Subjective     Pt reports: Substitute heel lift in left shoe was helpful in reducing some of her back pain after her last visit; She was unable to find the heel cups at [employer] when she worked the other day, will continue to look for them. She did not work today, so is feeling better  Today.   Pain Scale: Heri rates pain on a scale of 0-10 to be 4 currently.  Lumbar and right hip  Objective     Heri received individual therapeutic exercises to develop strength, endurance, ROM and flexibility for 40 minutes including:  Nustep level 1 x 15 min  Heel Raises x 15 - single leg (R)   Toe Taps x 2 min on 8" step     Step-ups 2 x 10 on 6" step  Lateral step-up 6" Right x 20   Standing hip flex/ext/abd x 10 - twisted blue band   Heel Cord stretch on wedge 3 x 30 sec  Sit to stand x 10  LAQ x 20 DNP  S/L hip abduction 10 x 2 - bilateral -   Clams: yellow T-band around thighs 2 x 10   Bridges x 15  Ball Squeezes x 2 min   QS x 2 min  dnp  SLR 2 x 10 1# dnp  SAQ x 3 min on small grey roll - 4#  Stairs x 7 - single railing; one foot per step up/down dnp        Written Home Exercises Provided:   Pt demo good understanding of the education provided. Heri demonstrated good return demonstration of activities.     Education provided re:  Heri verbalized good understanding of education provided.   No spiritual or educational barriers to learning provided    Assessment     Patient did very well with her exercise. Right hip strength is progressing well; able to perform S/L hip abduction " without fatigue now; Still demonstrates antalgic gait pattern - hopefully heel lift in left will help correct this as well as improve Camryn's back pain with standing activities.       This is a 71 y.o. female referred to outpatient physical therapy and presents with a medical diagnosis of Right hip fracture - s/p hemiarthroplasty, and presents to PT with Right hip pain, right LE weakness, decreased functional mobility and endurance; decreased LE function and demonstrates limitations as described in the problem list. Pt prognosis is Excellent. Pt will continue to benefit from skilled outpatient physical therapy to address the deficits listed in the problem list, provide pt/family education and to maximize pt's level of independence in the home and community environment.     GOALS:  Short Term Goals: 3 weeks  Pain:decrease pain to less than 3/10 in right hip with daily activities > MET  Gait: able to walk at community distances without use of AD > MET   Demonstrate compliance with initial exercise program > MET      Long Term Goals: 6 weeks     Pain: Decrease pain to 0/10 to allow for improvement in daily activities > Progressing - pain is between 0-1/10   Strength: Improve strength in right Hip to 5/5 for improved LE stability > Progressing.   ROM: Improve ROM to 90% of normal limits  > Progressing   Functional scale: Improve score on FOTO/Hip  to 24% limitation   Lifting: Able to lift groceries from cart/car and carry into house without increased pain/compensation  > Ongoing   Walking: Increase walking duration to 20 mins without pain > walking around yard, store, at least 20 mins.   Postures: Increase sitting and/or standing duration to 60 mins without pain to allow her to return to work > Progressing, able to sit and watch in hour TV program, Standing: about an hour, hour and 1/2 to prepare food.   Transfers: Perform all transfers without increased pain or limitation > MET   Exercise: demonstrate independence  with home exercise program to maintain gains made in therapy. > Progressing.         Plan     Continue with established Plan of Care towards PT goals. Increase level of exercise as tolerated.     Therapist: Airam Mata, PT  3/31/2022

## 2022-04-19 ENCOUNTER — CLINICAL SUPPORT (OUTPATIENT)
Dept: REHABILITATION | Facility: HOSPITAL | Age: 72
End: 2022-04-19
Attending: ORTHOPAEDIC SURGERY
Payer: MEDICARE

## 2022-04-19 DIAGNOSIS — G89.18 ACUTE POSTOPERATIVE PAIN OF RIGHT HIP: Primary | ICD-10-CM

## 2022-04-19 DIAGNOSIS — Z74.09 DECREASED FUNCTIONAL MOBILITY AND ENDURANCE: ICD-10-CM

## 2022-04-19 DIAGNOSIS — M25.551 ACUTE POSTOPERATIVE PAIN OF RIGHT HIP: Primary | ICD-10-CM

## 2022-04-19 PROCEDURE — 97110 THERAPEUTIC EXERCISES: CPT | Mod: PN,CQ

## 2022-04-19 NOTE — PROGRESS NOTES
"                                                    Physical Therapy Daily Note     Name: Heri GARCIA [facility]  Clinic Number: [id number]  Diagnosis:   Encounter Diagnoses   Name Primary?    Acute postoperative pain of right hip Yes    Decreased functional mobility and endurance      Physician: Christopher Yanez II, MD  Precautions: Standard; Hemiarthroplasty Right hip   Visit #: 17 of 18  PTA Visit #: 2   Time In:    4:05 PM  Time Out:  5:15 PM      Subjective     Pt reports: doing better at work and using a thermal back patch for pain  Pain Scale: Heri rates pain on a scale of 0-10 to be 2-3 currently.  Lumbar and right hip  Objective     Heri received individual therapeutic exercises to develop strength, endurance, ROM and flexibility for 40 minutes including:  Nustep level 1 x 15 min  Heel Raises x 15 - single leg (R)   Toe Taps x 2 min on 8" step     Step-ups 2 x 10 on 6" step  Lateral step-up 6" Right x 20   Standing hip flex/ext/abd x 10 - twisted blue band   Heel Cord stretch on wedge 3 x 30 sec  Sit to stand x 10  LAQ x 20 DNP  S/L hip abduction 10 x 2 - bilateral -   Clams: yellow T-band around thighs 2 x 10   Bridges x 15  Ball Squeezes x 2 min   QS x 2 min  dnp  SLR 2 x 10 1# dnp  SAQ x 3 min on small grey roll - 4#  Stairs x 7 - single railing; one foot per step up/down dnp        Written Home Exercises Provided:   Pt demo good understanding of the education provided. Heri demonstrated good return demonstration of activities.     Education provided re:  Heri verbalized good understanding of education provided.   No spiritual or educational barriers to learning provided    Assessment     Patient did very well with her exercise. Right hip strength is progressing well.  Worked on gait techniques to resolve the limp.       This is a 71 y.o. female referred to outpatient physical therapy and presents with a medical diagnosis of Right hip fracture - s/p hemiarthroplasty, and presents to PT with Right hip " pain, right LE weakness, decreased functional mobility and endurance; decreased LE function and demonstrates limitations as described in the problem list. Pt prognosis is Excellent. Pt will continue to benefit from skilled outpatient physical therapy to address the deficits listed in the problem list, provide pt/family education and to maximize pt's level of independence in the home and community environment.     GOALS:  Short Term Goals: 3 weeks  Pain:decrease pain to less than 3/10 in right hip with daily activities > MET  Gait: able to walk at community distances without use of AD > MET   Demonstrate compliance with initial exercise program > MET      Long Term Goals: 6 weeks     Pain: Decrease pain to 0/10 to allow for improvement in daily activities > Progressing - pain is between 0-1/10   Strength: Improve strength in right Hip to 5/5 for improved LE stability > Progressing.   ROM: Improve ROM to 90% of normal limits  > Progressing   Functional scale: Improve score on FOTO/Hip  to 24% limitation   Lifting: Able to lift groceries from cart/car and carry into house without increased pain/compensation  > Ongoing   Walking: Increase walking duration to 20 mins without pain > walking around yard, store, at least 20 mins.   Postures: Increase sitting and/or standing duration to 60 mins without pain to allow her to return to work > Progressing, able to sit and watch in hour TV program, Standing: about an hour, hour and 1/2 to prepare food.   Transfers: Perform all transfers without increased pain or limitation > MET   Exercise: demonstrate independence with home exercise program to maintain gains made in therapy. > Progressing.         Plan     Continue with established Plan of Care towards PT goals. Increase level of exercise as tolerated.     Therapist: Bright Rawls, PTA  4/19/2022

## 2022-04-21 ENCOUNTER — CLINICAL SUPPORT (OUTPATIENT)
Dept: REHABILITATION | Facility: HOSPITAL | Age: 72
End: 2022-04-21
Attending: ORTHOPAEDIC SURGERY
Payer: MEDICARE

## 2022-04-21 DIAGNOSIS — Z74.09 DECREASED FUNCTIONAL MOBILITY AND ENDURANCE: ICD-10-CM

## 2022-04-21 DIAGNOSIS — G89.18 ACUTE POSTOPERATIVE PAIN OF RIGHT HIP: Primary | ICD-10-CM

## 2022-04-21 DIAGNOSIS — M25.551 ACUTE POSTOPERATIVE PAIN OF RIGHT HIP: Primary | ICD-10-CM

## 2022-04-21 PROCEDURE — 97110 THERAPEUTIC EXERCISES: CPT | Mod: PN,CQ

## 2022-04-21 NOTE — PROGRESS NOTES
"                                                    Physical Therapy Daily Note     Name: Heri GARCIA [facility]  Clinic Number: [id number]  Diagnosis:   Encounter Diagnoses   Name Primary?    Acute postoperative pain of right hip Yes    Decreased functional mobility and endurance      Physician: Christopher Yanez II, MD  Precautions: Standard; Hemiarthroplasty Right hip   Visit #: 18 of 18  PTA Visit #: 2   Time In:    3:55 PM  Time Out:  4:30 PM      Subjective     Pt reports: doing better and practicing at home.  Pain Scale: Heri rates pain on a scale of 0-10 to be 0-1 currently.  Lumbar and right hip  Objective     Heri received individual therapeutic exercises to develop strength, endurance, ROM and flexibility for 30 minutes including:  Nustep level 1 x 15 min  Heel Raises x 15 - single leg (R)   Toe Taps x 2 min on 8" step     Step-ups 2 x 10 on 6" step  Lateral step-up 6" Right x 20   Standing hip flex/ext/abd x 10 - twisted blue band   Heel Cord stretch on wedge 3 x 30 sec  Sit to stand x 10   LAQ x 20 DNP  S/L hip abduction 10 x 2 - bilateral -   Clams: yellow T-band around thighs 2 x 10   Bridges x 15  Ball Squeezes x 2 min   QS x 2 min  dnp  SLR 2 x 10 1# dnp  SAQ x 3 min on small grey roll - 4#  Stairs x 7 - single railing; one foot per step up/down dnp        Written Home Exercises Provided:   Pt demo good understanding of the education provided. Heri demonstrated good return demonstration of activities.     Education provided re:  Heri verbalized good understanding of education provided.   No spiritual or educational barriers to learning provided    Assessment     Patient did very well with her exercise. Review HEP.    This is a 71 y.o. female referred to outpatient physical therapy and presents with a medical diagnosis of Right hip fracture - s/p hemiarthroplasty, and presents to PT with Right hip pain, right LE weakness, decreased functional mobility and endurance; decreased LE function and " demonstrates limitations as described in the problem list. Pt prognosis is Excellent.     GOALS:  Short Term Goals: 3 weeks  Pain:decrease pain to less than 3/10 in right hip with daily activities > MET  Gait: able to walk at community distances without use of AD > MET   Demonstrate compliance with initial exercise program > MET      Long Term Goals: 6 weeks     Pain: Decrease pain to 0/10 to allow for improvement in daily activities > Progressing - pain is between 0-1/10   Strength: Improve strength in right Hip to 5/5 for improved LE stability > Progressing.   ROM: Improve ROM to 90% of normal limits  > Progressing   Functional scale: Improve score on FOTO/Hip  to 24% limitation   Lifting: Able to lift groceries from cart/car and carry into house without increased pain/compensation  > Ongoing   Walking: Increase walking duration to 20 mins without pain > walking around yard, store, at least 20 mins.   Postures: Increase sitting and/or standing duration to 60 mins without pain to allow her to return to work > Progressing, able to sit and watch in hour TV program, Standing: about an hour, hour and 1/2 to prepare food.   Transfers: Perform all transfers without increased pain or limitation > MET   Exercise: demonstrate independence with home exercise program to maintain gains made in therapy. > Progressing.         Plan     Discharge with [facility]    Therapist: Bright Rawls, PTA  4/21/2022

## 2022-05-09 PROBLEM — G89.18 ACUTE POSTOPERATIVE PAIN OF RIGHT HIP: Status: RESOLVED | Noted: 2022-02-02 | Resolved: 2022-05-09

## 2022-05-09 PROBLEM — M25.551 ACUTE POSTOPERATIVE PAIN OF RIGHT HIP: Status: RESOLVED | Noted: 2022-02-02 | Resolved: 2022-05-09

## 2022-07-15 ENCOUNTER — HOSPITAL ENCOUNTER (EMERGENCY)
Facility: HOSPITAL | Age: 72
Discharge: HOME | End: 2022-07-15
Attending: FAMILY MEDICINE
Payer: MEDICARE

## 2022-07-15 VITALS
DIASTOLIC BLOOD PRESSURE: 79 MMHG | RESPIRATION RATE: 18 BRPM | OXYGEN SATURATION: 97 % | BODY MASS INDEX: 17.28 KG/M2 | SYSTOLIC BLOOD PRESSURE: 144 MMHG | WEIGHT: 88 LBS | HEART RATE: 94 BPM | TEMPERATURE: 99 F | HEIGHT: 60 IN

## 2022-07-15 DIAGNOSIS — K59.00 CONSTIPATION, UNSPECIFIED CONSTIPATION TYPE: Primary | ICD-10-CM

## 2022-07-15 DIAGNOSIS — R10.13 EPIGASTRIC PAIN: ICD-10-CM

## 2022-07-15 DIAGNOSIS — R52 PAIN: ICD-10-CM

## 2022-07-15 LAB
ALBUMIN SERPL BCP-MCNC: 3.5 G/DL (ref 3.5–5.2)
ALP SERPL-CCNC: 86 U/L (ref 55–135)
ALT SERPL W/O P-5'-P-CCNC: 8 U/L (ref 10–44)
ANION GAP SERPL CALC-SCNC: 9 MMOL/L (ref 8–16)
AST SERPL-CCNC: 15 U/L (ref 10–40)
BASOPHILS # BLD AUTO: 0.03 K/UL (ref 0–0.2)
BASOPHILS NFR BLD: 0.4 % (ref 0–1.9)
BILIRUB SERPL-MCNC: 1.7 MG/DL (ref 0.1–1)
BUN SERPL-MCNC: 6 MG/DL (ref 8–23)
CALCIUM SERPL-MCNC: 9.2 MG/DL (ref 8.7–10.5)
CHLORIDE SERPL-SCNC: 102 MMOL/L (ref 95–110)
CO2 SERPL-SCNC: 25 MMOL/L (ref 23–29)
CREAT SERPL-MCNC: 0.7 MG/DL (ref 0.5–1.4)
DIFFERENTIAL METHOD: ABNORMAL
EOSINOPHIL # BLD AUTO: 0 K/UL (ref 0–0.5)
EOSINOPHIL NFR BLD: 0.5 % (ref 0–8)
ERYTHROCYTE [DISTWIDTH] IN BLOOD BY AUTOMATED COUNT: 12.7 % (ref 11.5–14.5)
EST. GFR  (AFRICAN AMERICAN): >60 ML/MIN/1.73 M^2
EST. GFR  (NON AFRICAN AMERICAN): >60 ML/MIN/1.73 M^2
GLUCOSE SERPL-MCNC: 119 MG/DL (ref 70–110)
HCT VFR BLD AUTO: 37.6 % (ref 37–48.5)
HGB BLD-MCNC: 12.9 G/DL (ref 12–16)
IMM GRANULOCYTES # BLD AUTO: 0.03 K/UL (ref 0–0.04)
IMM GRANULOCYTES NFR BLD AUTO: 0.4 % (ref 0–0.5)
LIPASE SERPL-CCNC: 8 U/L (ref 4–60)
LYMPHOCYTES # BLD AUTO: 1 K/UL (ref 1–4.8)
LYMPHOCYTES NFR BLD: 11.9 % (ref 18–48)
MCH RBC QN AUTO: 30.4 PG (ref 27–31)
MCHC RBC AUTO-ENTMCNC: 34.3 G/DL (ref 32–36)
MCV RBC AUTO: 89 FL (ref 82–98)
MONOCYTES # BLD AUTO: 0.7 K/UL (ref 0.3–1)
MONOCYTES NFR BLD: 8.5 % (ref 4–15)
NEUTROPHILS # BLD AUTO: 6.4 K/UL (ref 1.8–7.7)
NEUTROPHILS NFR BLD: 78.3 % (ref 38–73)
NRBC BLD-RTO: 0 /100 WBC
PLATELET # BLD AUTO: 251 K/UL (ref 150–450)
PMV BLD AUTO: 10 FL (ref 9.2–12.9)
POTASSIUM SERPL-SCNC: 4 MMOL/L (ref 3.5–5.1)
PROT SERPL-MCNC: 7.2 G/DL (ref 6–8.4)
RBC # BLD AUTO: 4.24 M/UL (ref 4–5.4)
SODIUM SERPL-SCNC: 136 MMOL/L (ref 136–145)
WBC # BLD AUTO: 8.13 K/UL (ref 3.9–12.7)

## 2022-07-15 PROCEDURE — 99285 EMERGENCY DEPT VISIT HI MDM: CPT | Mod: 25

## 2022-07-15 PROCEDURE — 83690 ASSAY OF LIPASE: CPT | Performed by: FAMILY MEDICINE

## 2022-07-15 PROCEDURE — 74019 RADEX ABDOMEN 2 VIEWS: CPT | Mod: 26,,, | Performed by: RADIOLOGY

## 2022-07-15 PROCEDURE — 96360 HYDRATION IV INFUSION INIT: CPT

## 2022-07-15 PROCEDURE — 93010 EKG 12-LEAD: ICD-10-PCS | Mod: ,,, | Performed by: INTERNAL MEDICINE

## 2022-07-15 PROCEDURE — 25000003 PHARM REV CODE 250: Performed by: FAMILY MEDICINE

## 2022-07-15 PROCEDURE — 74019 RADEX ABDOMEN 2 VIEWS: CPT | Mod: TC

## 2022-07-15 PROCEDURE — 85025 COMPLETE CBC W/AUTO DIFF WBC: CPT | Performed by: FAMILY MEDICINE

## 2022-07-15 PROCEDURE — 74019 XR ABDOMEN FLAT AND ERECT: ICD-10-PCS | Mod: 26,,, | Performed by: RADIOLOGY

## 2022-07-15 PROCEDURE — 93010 ELECTROCARDIOGRAM REPORT: CPT | Mod: ,,, | Performed by: INTERNAL MEDICINE

## 2022-07-15 PROCEDURE — 80053 COMPREHEN METABOLIC PANEL: CPT | Performed by: FAMILY MEDICINE

## 2022-07-15 PROCEDURE — 93005 ELECTROCARDIOGRAM TRACING: CPT

## 2022-07-15 RX ADMIN — SODIUM CHLORIDE 500 ML: 0.9 INJECTION, SOLUTION INTRAVENOUS at 03:07

## 2022-07-16 NOTE — ED PROVIDER NOTES
"Encounter Date: 7/15/2022       History     Chief Complaint   Patient presents with    Constipation     Pt was having diarrhea, started taking an anti diarrheal and is now constipated.Pt reporting epigastric pain, pt with hx of hernia      72-year-old female presents the ED complaining of constipation abdominal cramps she states that she had diarrhea after a back injection "I took a whole bottle of diarrhea pills" this was over 3 days, this was lomotil loperamide hyper hydro chloride        Review of patient's allergies indicates:  No Known Allergies  Past Medical History:   Diagnosis Date    Anxiety     GERD (gastroesophageal reflux disease)      Past Surgical History:   Procedure Laterality Date    FRACTURE SURGERY      HEMIARTHROPLASTY OF HIP Right 11/13/2021    Procedure: HEMIARTHROPLASTY, HIP;  Surgeon: Dontae Lopez MD;  Location: [facility];  Service: Orthopedics;  Laterality: Right;     Family History   Problem Relation Age of Onset    Heart disease Mother      Social History     Tobacco Use    Smoking status: Never Smoker    Smokeless tobacco: Never Used   Substance Use Topics    Alcohol use: Yes     Comment: a bottle of wine or 6 glasses of beer daily    Drug use: No     Review of Systems   Constitutional: Negative for fever.   HENT: Negative for sore throat.    Respiratory: Negative for shortness of breath.    Cardiovascular: Negative for chest pain.   Gastrointestinal: Negative for nausea.   Endocrine: Negative for cold intolerance.   Genitourinary: Negative for dysuria.   Musculoskeletal: Negative for back pain.   Skin: Negative for rash.   Neurological: Negative for weakness.   Hematological: Does not bruise/bleed easily.       Physical Exam     Initial Vitals [07/15/22 1459]   BP Pulse Resp Temp SpO2   (!) 144/79 94 18 99.2 °F (37.3 °C) 97 %      MAP       --         Physical Exam    Nursing note and vitals reviewed.  Constitutional: She appears well-developed and well-nourished. She is not " diaphoretic. No distress.   HENT:   Head: Normocephalic and atraumatic.   Eyes: Pupils are equal, round, and reactive to light. Right eye exhibits no discharge. Left eye exhibits no discharge.   Neck: No tracheal deviation present. No JVD present.   Cardiovascular: Exam reveals no friction rub.    No murmur heard.  Pulmonary/Chest: No stridor. No respiratory distress. She has no wheezes. She has no rales.   Abdominal: Bowel sounds are normal. She exhibits no distension.   Musculoskeletal:         General: Normal range of motion.     Neurological: She is alert.   Skin: Skin is warm.   Psychiatric: She has a normal mood and affect.         ED Course   Procedures  Labs Reviewed   CBC W/ AUTO DIFFERENTIAL - Abnormal; Notable for the following components:       Result Value    Gran % 78.3 (*)     Lymph % 11.9 (*)     All other components within normal limits   COMPREHENSIVE METABOLIC PANEL - Abnormal; Notable for the following components:    Glucose 119 (*)     BUN 6 (*)     Total Bilirubin 1.7 (*)     ALT 8 (*)     All other components within normal limits   LIPASE     EKG Readings: (Independently Interpreted)   Initial Reading: No STEMI. Rhythm: Normal Sinus Rhythm. Heart Rate: 03. Ectopy: No Ectopy. Conduction: RBBB. ST Segments: Normal ST Segments. T Waves: Normal.     ECG Results          EKG 12-lead (Final result)  Result time 07/15/22 16:35:03    Final result by Interface, Lab [facility] (07/15/22 16:35:03)                 Narrative:    Test Reason : R10.13,    Vent. Rate : 093 BPM     Atrial Rate : 093 BPM     P-R Int : 114 ms          QRS Dur : 112 ms      QT Int : 386 ms       P-R-T Axes : 027 016 -07 degrees     QTc Int : 479 ms    Normal sinus rhythm  Right bundle branch block  Abnormal ECG  When compared with ECG of 11-NOV-2021 14:29,  No significant change was found  Confirmed by Preet Marie MD ([id number]) on 7/15/2022 4:34:57 PM    Referred By: KENTRELL   SELF           Confirmed By:Preet Marie MD                             Imaging Results          X-Ray Abdomen Flat And Erect (Final result)  Result time 07/15/22 16:07:01    Final result by Rosetta Field MD (07/15/22 16:07:01)                 Impression:      Normal bowel gas pattern.    Multiple compression deformities in the thoracic and lumbar spine.      Electronically signed by: Rosetta Field  Date:    07/15/2022  Time:    16:07             Narrative:    EXAMINATION:  XR ABDOMEN FLAT AND ERECT    CLINICAL HISTORY:  Pain, unspecified    TECHNIQUE:  Flat and erect AP views of the abdomen were performed.    COMPARISON:  10/07/2011    FINDINGS:  The bowel gas pattern is nonobstructive and there is no free air.  The patient is status post right hip replacement.  The bones are demineralized and there are multilevel degenerative changes.  There is loss of height of multiple lower thoracic and lumbar vertebra consistent with compression deformities of uncertain age.                              X-Rays:   Independently Interpreted Readings:   Other Readings:  X-ray show no abnormal bowel gas pattern    Medications   sodium chloride 0.9% bolus 500 mL (0 mLs Intravenous Stopped 7/15/22 1628)     Medical Decision Making:   ED Management:  Patient's symptoms dissipated in the ED  I have advised the family to watch the patient closely when it comes to any prescription or over-the-counter medication                      Clinical Impression:   Final diagnoses:  [R52] Pain  [R10.13] Epigastric pain  [K59.00] Constipation, unspecified constipation type (Primary)          ED Disposition Condition    Discharge Stable        ED Prescriptions     None        Follow-up Information    None          Fei Landrum MD  07/16/22 [id number]       Fei Landrum MD  07/16/22 [id number]

## 2022-07-21 ENCOUNTER — OFFICE VISIT (OUTPATIENT)
Dept: GASTROENTEROLOGY | Facility: CLINIC | Age: 72
End: 2022-07-21
Payer: MEDICARE

## 2022-07-21 VITALS
HEART RATE: 101 BPM | BODY MASS INDEX: 17.75 KG/M2 | SYSTOLIC BLOOD PRESSURE: 133 MMHG | RESPIRATION RATE: 18 BRPM | HEIGHT: 60 IN | DIASTOLIC BLOOD PRESSURE: 78 MMHG | WEIGHT: 90.38 LBS

## 2022-07-21 DIAGNOSIS — K56.609 INTESTINAL OBSTRUCTION, UNSPECIFIED CAUSE, UNSPECIFIED WHETHER PARTIAL OR COMPLETE: ICD-10-CM

## 2022-07-21 DIAGNOSIS — R19.7 DIARRHEA, UNSPECIFIED TYPE: Primary | ICD-10-CM

## 2022-07-21 PROCEDURE — 99204 OFFICE O/P NEW MOD 45 MIN: CPT | Mod: S$PBB,,, | Performed by: INTERNAL MEDICINE

## 2022-07-21 PROCEDURE — 99999 PR PBB SHADOW E&M-EST. PATIENT-LVL III: ICD-10-PCS | Mod: PBBFAC,,, | Performed by: INTERNAL MEDICINE

## 2022-07-21 PROCEDURE — 99213 OFFICE O/P EST LOW 20 MIN: CPT | Mod: PBBFAC,PN | Performed by: INTERNAL MEDICINE

## 2022-07-21 PROCEDURE — 99999 PR PBB SHADOW E&M-EST. PATIENT-LVL III: CPT | Mod: PBBFAC,,, | Performed by: INTERNAL MEDICINE

## 2022-07-21 PROCEDURE — 99204 PR OFFICE/OUTPT VISIT, NEW, LEVL IV, 45-59 MIN: ICD-10-PCS | Mod: S$PBB,,, | Performed by: INTERNAL MEDICINE

## 2022-07-21 RX ORDER — TRAMADOL HYDROCHLORIDE 50 MG/1
50 TABLET ORAL 3 TIMES DAILY PRN
COMMUNITY
Start: 2022-06-10

## 2022-07-21 RX ORDER — CHOLESTYRAMINE 4 G/9G
POWDER, FOR SUSPENSION ORAL
COMMUNITY
Start: 2022-07-01

## 2022-07-21 RX ORDER — ALPRAZOLAM 0.25 MG/1
0.25 TABLET ORAL
COMMUNITY
Start: 2022-07-01 | End: 2023-12-02

## 2022-07-21 NOTE — LETTER
July 21, 2022    Heri Jansen  [address] [facility] [place] [address]             [facility]  Gastroenterology  [address] [place] [address] A  [place] [address]  Phone: [phone]  Fax: [phone]   July 21, 2022     Patient: Heri Jansen   YOB: 1950   Date of Visit: 7/21/2022       To Whom it May Concern:    Heri Jansen was seen in my clinic on 7/21/2022. Her return date is unexpected at this time. She will follow up with me in 3 weeks for a reevaluation.    Please excuse her from any classes or work missed.    If you have any questions or concerns, please don't hesitate to call.    Sincerely,         Ian White MD

## 2022-07-21 NOTE — PATIENT INSTRUCTIONS
She will continue her reflux regimen current medications vitamins and minerals.  She will make a food diary and avoid the offending foods.  Evaluation of her diarrhea is in progress with CT scanning and stool studies.  The extensive [facility] records were requested.
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
## 2022-07-25 ENCOUNTER — HOSPITAL ENCOUNTER (OUTPATIENT)
Dept: RADIOLOGY | Facility: HOSPITAL | Age: 72
Discharge: HOME | End: 2022-07-25
Attending: INTERNAL MEDICINE
Payer: MEDICARE

## 2022-07-25 DIAGNOSIS — K56.609 INTESTINAL OBSTRUCTION, UNSPECIFIED CAUSE, UNSPECIFIED WHETHER PARTIAL OR COMPLETE: ICD-10-CM

## 2022-07-25 PROCEDURE — 74177 CT ABD & PELVIS W/CONTRAST: CPT | Mod: TC

## 2022-07-25 PROCEDURE — 74177 CT ABD & PELVIS W/CONTRAST: CPT | Mod: 26,,, | Performed by: RADIOLOGY

## 2022-07-25 PROCEDURE — 25500020 PHARM REV CODE 255: Performed by: INTERNAL MEDICINE

## 2022-07-25 PROCEDURE — A9698 NON-RAD CONTRAST MATERIALNOC: HCPCS | Performed by: INTERNAL MEDICINE

## 2022-07-25 PROCEDURE — 74177 CT ENTEROGRAPHY ABD_PELVIS WITH CONTRAST: ICD-10-PCS | Mod: 26,,, | Performed by: RADIOLOGY

## 2022-07-25 RX ADMIN — IOHEXOL 100 ML: 350 INJECTION, SOLUTION INTRAVENOUS at 03:07

## 2022-07-25 RX ADMIN — BARIUM SULFATE 1350 ML: 1 SUSPENSION ORAL at 03:07

## 2022-07-26 ENCOUNTER — TELEPHONE (OUTPATIENT)
Dept: GASTROENTEROLOGY | Facility: CLINIC | Age: 72
End: 2022-07-26
Payer: MEDICARE

## 2022-07-26 NOTE — TELEPHONE ENCOUNTER
----- Message from Ian White MD sent at 7/26/2022  7:26 AM CDT -----  The CT scan shows possible diverticular inflammation.  Make appointment to discuss results.

## 2022-07-26 NOTE — TELEPHONE ENCOUNTER
Contacted patients son in law, patient states that he wants her to be seen sooner than the appointment scheduled. Attempted to explain to son in law that at this moment there are no available appointments any sooner, and patient did not agree and still wants to be seen sooner. Let patient know that we would discuss his concern with MD, and he verbalized understanding.

## 2022-07-29 ENCOUNTER — TELEPHONE (OUTPATIENT)
Dept: GASTROENTEROLOGY | Facility: CLINIC | Age: 72
End: 2022-07-29
Payer: MEDICARE

## 2022-07-29 NOTE — TELEPHONE ENCOUNTER
Called and spoke with patient son in law Vences. Informed that patient does have an upcoming appt on 08/12/22 at 0840 with Dr. White. Patients son in law Vences expresses frustration as he states that Dr. White said he would like to see her per her CT scan and now they can not get in to the office for a month.     Looked at MD communication from CT scan and informed Ru, of MD communications and that he would like to see her to discuss results further pt does have an appointment and I am unable to schedule anything sooner than what pt already has. Ru voices frustration and ends the call.

## 2022-07-29 NOTE — TELEPHONE ENCOUNTER
----- Message from Dick Bliss sent at 7/28/2022 11:01 AM CDT -----  Contact: Son-in-law/Ru  Type: Needs Medical Advice  Who Called:  Son-in-law/Ru  Best Call Back Number: [phone]   Additional Information:  Called to speak with office regarding sooner appt.

## 2022-07-29 NOTE — TELEPHONE ENCOUNTER
----- Message from Jaimie Galvin, Patient Care Assistant sent at 7/29/2022  8:05 AM CDT -----  Contact: Pt Son  Type: Return Call    Who Called: Pt Son  Who Left Message for Pt: Sunshine  Does the patient know what this is regarding: Yes/Scheduling  Best Call Back Number: [phone]  Thank you~

## 2022-08-12 ENCOUNTER — OFFICE VISIT (OUTPATIENT)
Dept: GASTROENTEROLOGY | Facility: CLINIC | Age: 72
End: 2022-08-12
Payer: MEDICARE

## 2022-08-12 VITALS
SYSTOLIC BLOOD PRESSURE: 134 MMHG | TEMPERATURE: 98 F | WEIGHT: 91.94 LBS | DIASTOLIC BLOOD PRESSURE: 73 MMHG | BODY MASS INDEX: 18.05 KG/M2 | HEART RATE: 90 BPM | HEIGHT: 60 IN

## 2022-08-12 DIAGNOSIS — M25.559 HIP PAIN: ICD-10-CM

## 2022-08-12 DIAGNOSIS — K21.9 GASTROESOPHAGEAL REFLUX DISEASE, UNSPECIFIED WHETHER ESOPHAGITIS PRESENT: ICD-10-CM

## 2022-08-12 DIAGNOSIS — K57.92 DIVERTICULITIS: ICD-10-CM

## 2022-08-12 DIAGNOSIS — R10.9 ABDOMINAL PAIN, UNSPECIFIED ABDOMINAL LOCATION: Primary | ICD-10-CM

## 2022-08-12 PROCEDURE — 99999 PR PBB SHADOW E&M-EST. PATIENT-LVL V: ICD-10-PCS | Mod: PBBFAC,,, | Performed by: INTERNAL MEDICINE

## 2022-08-12 PROCEDURE — 99214 PR OFFICE/OUTPT VISIT, EST, LEVL IV, 30-39 MIN: ICD-10-PCS | Mod: S$PBB,,, | Performed by: INTERNAL MEDICINE

## 2022-08-12 PROCEDURE — 99214 OFFICE O/P EST MOD 30 MIN: CPT | Mod: S$PBB,,, | Performed by: INTERNAL MEDICINE

## 2022-08-12 PROCEDURE — 99215 OFFICE O/P EST HI 40 MIN: CPT | Mod: PBBFAC,PN | Performed by: INTERNAL MEDICINE

## 2022-08-12 PROCEDURE — 99999 PR PBB SHADOW E&M-EST. PATIENT-LVL V: CPT | Mod: PBBFAC,,, | Performed by: INTERNAL MEDICINE

## 2022-08-12 NOTE — PROGRESS NOTES
Subjective:       Patient ID: Heri Jansen is a 72 y.o. female.    Chief Complaint: Follow-up    The CT scan shows an abnormality of the s are igmoid colon with narrowing and possibly spasm or inflammation.  She is referred to the surgeon.  She states her diarrhea is better.  She is not taking the Imodium excessively.  She was taking up  to 30 Imodium a day.  Now that she has cut back on the Imodium she is having 1-2 bowel movements per day that are semi-solid.  She denies hematemesis hematochezia jaundice or bleeding.  She now is trying to ingest 3 meals per day.  Her abdominal symptoms have markedly improved although she has a nonspecific discomfort in the left lower quadrant.  She has had nausea with right upper quadrant pressure and ultrasound was scheduled but not completed.  She denies jaundice or bleeding.  She denies dysphagia aspiration.      Allergies:  Review of patient's allergies indicates:  No Known Allergies    Medications:    Current Outpatient Medications:     ALPRAZolam (XANAX) 0.25 MG tablet, 0.25 mg., Disp: , Rfl:     azelastine (ASTELIN) 137 mcg (0.1 %) nasal spray, TAKE 2 PUFFS EACH NOSTRIL TWICE A DAY, Disp: , Rfl:     cyproheptadine (PERIACTIN) 4 mg tablet, Take 0.125 mg by mouth 4 (four) times daily before meals and nightly. DISSOLVE 1 TABLET ORALLY EVERY 4 HOURS BEFORE MEALS, Disp: , Rfl:     famotidine (PEPCID) 40 MG tablet, 40 mg., Disp: , Rfl:     fluticasone propionate (FLONASE) 50 mcg/actuation nasal spray, SMARTSI Puff(s) Both Nares Twice Daily, Disp: , Rfl:     hyoscyamine (LEVSIN/SL) 0.125 mg Subl, Place 0.125 mg under the tongue every 4 (four) hours as needed. Take sublingual before meals, Disp: , Rfl:     omeprazole (PRILOSEC) 40 MG capsule, Take 40 mg by mouth 2 (two) times daily., Disp: , Rfl:     traMADoL (ULTRAM) 50 mg tablet, Take 50 mg by mouth 3 (three) times daily as needed., Disp: , Rfl:     alprazolam (XANAX XR) 1 MG Tb24, Take 0.5 mg by mouth once  daily., Disp: , Rfl:     ALPRAZolam (XANAX) 0.5 MG tablet, SMARTSI Tablet(s) By Mouth Every 12 Hours PRN, Disp: , Rfl:     cholestyramine (QUESTRAN) 4 gram packet,  = 1 packet, Oral, BID, # 60 EA, 0 Refill(s), Maintenance, Pharmacy: [facility], GERD (gastroesophageal reflux disease)  Diarrhea  Stricture esophagus, 158, cm, 22 9:22:00 CDT, Height/Length Measured, 44.8, kg, 22 9:22:00 CDT, Weight Do..., Disp: , Rfl:     HYDROcodone-acetaminophen (NORCO) 5-325 mg per tablet, Take 1 tablet by mouth every 6 (six) hours as needed for Pain. (Patient not taking: Reported on 2022), Disp: 28 tablet, Rfl: 0    lidocaine HCl 2% (XYLOCAINE) 2 % Soln, Take 5 mLs by mouth every 4 (four) hours. (Patient not taking: Reported on 2022), Disp: 100 mL, Rfl: 0    rivaroxaban (XARELTO) 10 mg Tab, Take 1 tablet (10 mg total) by mouth daily with dinner or evening meal. for 5 days, Disp: 5 tablet, Rfl: 0    Past Medical History:   Diagnosis Date    Anxiety     GERD (gastroesophageal reflux disease)        Past Surgical History:   Procedure Laterality Date    FRACTURE SURGERY      HEMIARTHROPLASTY OF HIP Right 2021    Procedure: HEMIARTHROPLASTY, HIP;  Surgeon: Dontae Lopez MD;  Location: [facility];  Service: Orthopedics;  Laterality: Right;         Review of Systems   Constitutional: Negative for appetite change, fever and unexpected weight change.   HENT: Negative for trouble swallowing.         No jaundice.   Respiratory: Negative for cough, shortness of breath and wheezing.         She denies tobacco alcohol consumption.  She denies dysphagia aspiration hemoptysis chronic cough chronic sputum production or dyspnea on exertion.   Cardiovascular: Negative for chest pain.        She denies exertional chest pain or rhythm disturbance.   Gastrointestinal: Positive for abdominal pain and nausea. Negative for abdominal distention, anal bleeding, blood in stool, constipation, diarrhea, rectal pain  and vomiting.        She states the Pepcid has resolved the pyrosis and the dyspepsia.  She has had occasional nausea and left upper quadrant discomfort or pressure.  An ultrasound was scheduled.   Musculoskeletal: Positive for arthralgias. Negative for back pain and neck pain.        She is followed by the orthopedic group.  She recently had injection in her back which she believes cause the diarrhea.  And the back pains persisted.  The CT scan shows an abnormality of her sacrum and right hip.  A bone scan was recommended by the radiologist.  She is scheduled to follow up with her orthopedist.   Skin: Negative for pallor and rash.   Neurological: Negative for dizziness, seizures, syncope, speech difficulty, weakness and numbness.   Hematological: Negative for adenopathy.   Psychiatric/Behavioral: Negative for confusion.       Objective:      Physical Exam  Vitals reviewed.   Constitutional:       Appearance: She is well-developed.      Comments: Well-nourished well-hydrated thin afebrile nonicteric white female.  She is normocephalic.  Pupils are normal.  She is sitting comfortably in the chair.  She is breathing normally.  She appears to be oriented x3 and can relate her history and answer questions appropriately.   HENT:      Head: Normocephalic.   Eyes:      Pupils: Pupils are equal, round, and reactive to light.   Neck:      Thyroid: No thyromegaly.      Trachea: No tracheal deviation.   Cardiovascular:      Rate and Rhythm: Normal rate and regular rhythm.      Heart sounds: Normal heart sounds.   Pulmonary:      Effort: Pulmonary effort is normal.      Breath sounds: Normal breath sounds.   Abdominal:      General: Bowel sounds are normal. There is no distension.      Palpations: Abdomen is soft. There is no mass.      Tenderness: There is abdominal tenderness. There is no guarding or rebound.      Hernia: No hernia is present.      Comments: The abdomen is soft with mild right upper quadrant left upper  quadrant.  Organomegaly masses not detected.  Bowel sounds are normal.   Musculoskeletal:      Cervical back: Normal range of motion and neck supple.      Comments: She ambulates slowly.  She can go from the sitting the standing position without difficulty.  She can get on the exam table without difficulty.   Lymphadenopathy:      Cervical: No cervical adenopathy.   Skin:     General: Skin is warm and dry.   Neurological:      Mental Status: She is alert and oriented to person, place, and time.      Cranial Nerves: No cranial nerve deficit.   Psychiatric:         Behavior: Behavior normal.           Plan:       Abdominal pain, unspecified abdominal location  -     NM Bone Scan 3 Phase Hip; Future; Expected date: 08/12/2022  -     US Abdomen Complete; Future; Expected date: 08/12/2022  -     Ambulatory referral/consult to General Surgery; Future; Expected date: 08/19/2022    Gastroesophageal reflux disease, unspecified whether esophagitis present    Diverticulitis    Hip pain    She will continue her reflux regimen.  She continues her nutritious diet and will avoid the offending foods such as the fried and fatty foods.  She has added more fiber to the diet.  The sigmoid colon is abnormal and she is referred to the surgeon.  Additionally bone scan will be scheduled in she will follow-up with her orthopedist.  She has an ultrasound scheduled to evaluate the liver and gallbladder.

## 2022-08-12 NOTE — PATIENT INSTRUCTIONS
The CT scan shows an abnormality of the sacrum.  A bone scan will be scheduled.  She will follow-up with the [facility] for the back pain.  She will be referred to the surgeon to evaluate the abnormal sigmoid colon.  The diarrhea is markedly improved and she will avoid the Imodium.  She will continue her nutritious diet and avoid the offending foods.  The ultrasound is scheduled to evaluate the abdominal pain.

## 2022-08-25 ENCOUNTER — HOSPITAL ENCOUNTER (OUTPATIENT)
Dept: RADIOLOGY | Facility: HOSPITAL | Age: 72
Discharge: HOME | End: 2022-08-25
Attending: INTERNAL MEDICINE
Payer: MEDICARE

## 2022-08-25 DIAGNOSIS — K56.609 INTESTINAL OBSTRUCTION, UNSPECIFIED CAUSE, UNSPECIFIED WHETHER PARTIAL OR COMPLETE: ICD-10-CM

## 2022-08-25 PROCEDURE — 76700 US ABDOMEN COMPLETE: ICD-10-PCS | Mod: 26,,, | Performed by: RADIOLOGY

## 2022-08-25 PROCEDURE — 76700 US EXAM ABDOM COMPLETE: CPT | Mod: TC

## 2022-08-25 PROCEDURE — 76700 US EXAM ABDOM COMPLETE: CPT | Mod: 26,,, | Performed by: RADIOLOGY

## 2022-08-26 ENCOUNTER — HOSPITAL ENCOUNTER (OUTPATIENT)
Dept: RADIOLOGY | Facility: HOSPITAL | Age: 72
Discharge: HOME | End: 2022-08-26
Attending: INTERNAL MEDICINE
Payer: MEDICARE

## 2022-08-26 DIAGNOSIS — R10.9 ABDOMINAL PAIN, UNSPECIFIED ABDOMINAL LOCATION: ICD-10-CM

## 2022-08-26 PROCEDURE — 78315 NM BONE SCAN 3 PHASE HIP: ICD-10-PCS | Mod: 26,,, | Performed by: RADIOLOGY

## 2022-08-26 PROCEDURE — 78315 BONE IMAGING 3 PHASE: CPT | Mod: 26,,, | Performed by: RADIOLOGY

## 2022-08-26 PROCEDURE — A9503 TC99M MEDRONATE: HCPCS

## 2022-08-30 ENCOUNTER — TELEPHONE (OUTPATIENT)
Dept: GASTROENTEROLOGY | Facility: CLINIC | Age: 72
End: 2022-08-30
Payer: MEDICARE

## 2022-08-30 NOTE — TELEPHONE ENCOUNTER
----- Message from Jaimie Galvin Patient Care Assistant sent at 8/26/2022  9:46 AM CDT -----  Contact: Pt Son in Law  Type: Needs Medical Advice    Who Called: Pt Son in Law  Best Call Back Number: [phone]  Inquiry/Question: Pt son in law is calling to get the status of scheduling a colonoscopy for the pt. He sates that Dr White wanted the pt to be scheduled before her next appt. Please call back and advise. Thank you~

## 2022-08-31 ENCOUNTER — TELEPHONE (OUTPATIENT)
Dept: CARDIOLOGY | Facility: CLINIC | Age: 72
End: 2022-08-31
Payer: MEDICARE

## 2022-09-02 ENCOUNTER — OFFICE VISIT (OUTPATIENT)
Dept: GASTROENTEROLOGY | Facility: CLINIC | Age: 72
End: 2022-09-02
Payer: MEDICARE

## 2022-09-02 VITALS
OXYGEN SATURATION: 97 % | RESPIRATION RATE: 17 BRPM | SYSTOLIC BLOOD PRESSURE: 138 MMHG | HEART RATE: 97 BPM | DIASTOLIC BLOOD PRESSURE: 76 MMHG | WEIGHT: 91.38 LBS | HEIGHT: 60 IN | BODY MASS INDEX: 17.94 KG/M2

## 2022-09-02 DIAGNOSIS — K52.9 SIGMOIDITIS: Primary | ICD-10-CM

## 2022-09-02 DIAGNOSIS — K21.9 GASTROESOPHAGEAL REFLUX DISEASE, UNSPECIFIED WHETHER ESOPHAGITIS PRESENT: ICD-10-CM

## 2022-09-02 DIAGNOSIS — K57.92 DIVERTICULITIS: ICD-10-CM

## 2022-09-02 DIAGNOSIS — S32.10XA CLOSED FRACTURE OF SACRUM, UNSPECIFIED PORTION OF SACRUM, INITIAL ENCOUNTER: ICD-10-CM

## 2022-09-02 DIAGNOSIS — K81.1 CHRONIC CHOLECYSTITIS: ICD-10-CM

## 2022-09-02 PROCEDURE — 99214 OFFICE O/P EST MOD 30 MIN: CPT | Mod: S$PBB,,, | Performed by: INTERNAL MEDICINE

## 2022-09-02 PROCEDURE — 99999 PR PBB SHADOW E&M-EST. PATIENT-LVL V: ICD-10-PCS | Mod: PBBFAC,,, | Performed by: INTERNAL MEDICINE

## 2022-09-02 PROCEDURE — 99215 OFFICE O/P EST HI 40 MIN: CPT | Mod: PBBFAC,PN | Performed by: INTERNAL MEDICINE

## 2022-09-02 PROCEDURE — 99214 PR OFFICE/OUTPT VISIT, EST, LEVL IV, 30-39 MIN: ICD-10-PCS | Mod: S$PBB,,, | Performed by: INTERNAL MEDICINE

## 2022-09-02 PROCEDURE — 99999 PR PBB SHADOW E&M-EST. PATIENT-LVL V: CPT | Mod: PBBFAC,,, | Performed by: INTERNAL MEDICINE

## 2022-09-02 NOTE — PATIENT INSTRUCTIONS
She will be referred to Dr. Christopher Yanez the orthopedist for the sacral fracture.  She has complained of right hip pain and is had surgery.  She is referred to Dr. Lex Morton for the sigmoiditis.  She will continue her current medications vitamins and minerals.

## 2022-09-02 NOTE — PROGRESS NOTES
Subjective:       Patient ID: Heri Jansen is a 72 y.o. female.    Chief Complaint: Follow-up (Needs colo)    The bone scan shows possibly has sacral fracture on the right.  She has had a right hip replacement.  She has had arthritis of the back and hip pain.  She has an abnormal ultrasound will sludge but she did not follow-up with the biliary scan.  She was referred to the surgeon for the inflamed narrowed sigmoid colon.  She has had diarrhea.  She denies fever chills hematemesis hematochezia jaundice or bleeding.    Allergies:  Review of patient's allergies indicates:  No Known Allergies    Medications:    Current Outpatient Medications:     alprazolam (XANAX XR) 1 MG Tb24, Take 0.5 mg by mouth once daily., Disp: , Rfl:     ALPRAZolam (XANAX) 0.25 MG tablet, 0.25 mg., Disp: , Rfl:     ALPRAZolam (XANAX) 0.5 MG tablet, SMARTSI Tablet(s) By Mouth Every 12 Hours PRN, Disp: , Rfl:     azelastine (ASTELIN) 137 mcg (0.1 %) nasal spray, TAKE 2 PUFFS EACH NOSTRIL TWICE A DAY, Disp: , Rfl:     cholestyramine (QUESTRAN) 4 gram packet,  = 1 packet, Oral, BID, # 60 EA, 0 Refill(s), Maintenance, Pharmacy: [facility], GERD (gastroesophageal reflux disease)  Diarrhea  Stricture esophagus, 158, cm, 22 9:22:00 CDT, Height/Length Measured, 44.8, kg, 22 9:22:00 CDT, Weight Do..., Disp: , Rfl:     cyproheptadine (PERIACTIN) 4 mg tablet, Take 0.125 mg by mouth 4 (four) times daily before meals and nightly. DISSOLVE 1 TABLET ORALLY EVERY 4 HOURS BEFORE MEALS, Disp: , Rfl:     famotidine (PEPCID) 40 MG tablet, 40 mg., Disp: , Rfl:     fluticasone propionate (FLONASE) 50 mcg/actuation nasal spray, SMARTSI Puff(s) Both Nares Twice Daily, Disp: , Rfl:     HYDROcodone-acetaminophen (NORCO) 5-325 mg per tablet, Take 1 tablet by mouth every 6 (six) hours as needed for Pain., Disp: 28 tablet, Rfl: 0    hyoscyamine (LEVSIN/SL) 0.125 mg Subl, Place 0.125 mg under the tongue every 4 (four) hours as needed. Take  sublingual before meals, Disp: , Rfl:     lidocaine HCl 2% (XYLOCAINE) 2 % Soln, Take 5 mLs by mouth every 4 (four) hours., Disp: 100 mL, Rfl: 0    omeprazole (PRILOSEC) 40 MG capsule, Take 40 mg by mouth 2 (two) times daily., Disp: , Rfl:     rivaroxaban (XARELTO) 10 mg Tab, Take 1 tablet (10 mg total) by mouth daily with dinner or evening meal. for 5 days, Disp: 5 tablet, Rfl: 0    traMADoL (ULTRAM) 50 mg tablet, Take 50 mg by mouth 3 (three) times daily as needed., Disp: , Rfl:     Past Medical History:   Diagnosis Date    Anxiety     GERD (gastroesophageal reflux disease)        Past Surgical History:   Procedure Laterality Date    FRACTURE SURGERY      HEMIARTHROPLASTY OF HIP Right 11/13/2021    Procedure: HEMIARTHROPLASTY, HIP;  Surgeon: Dontae Lopez MD;  [facility];  Service: Orthopedics;  Laterality: Right;         Review of Systems   Constitutional:  Negative for appetite change, fever and unexpected weight change.   HENT:  Negative for trouble swallowing.         No jaundice.   Respiratory:  Negative for cough, shortness of breath and wheezing.         She denies tobacco usage.  She consumes minimal or none none alcohol.  She denies dysphagia aspiration hemoptysis chronic cough chronic sputum production or dyspnea on exertion.   Cardiovascular:  Negative for chest pain.        She denies exertional chest pain or rhythm disturbance.   Gastrointestinal:  Positive for abdominal pain, diarrhea and nausea. Negative for abdominal distention, anal bleeding, blood in stool and constipation.        She has a history gastroesophageal reflux and is on the omeprazole in the morning and the Pepcid at night.  She has tried to avoid the offending foods particularly the fried in the fatty foods.  She denies dysphagia aspiration hematemesis hematochezia jaundice or bleeding.  The ultrasound reveals sludge.  She never followed up with the biliary scan.  She has had diarrhea and has tried the Colestid and  cholestyramine.  The diarrhea has resolved and she has only 1-2 bowel movements per day.  The CT reveals a narrowed inflamed sigmoid colon and she was referred to the surgeons.  She denies fever chills.   Musculoskeletal:  Positive for arthralgias and back pain. Negative for neck pain.   Skin:  Negative for pallor and rash.   Neurological:  Negative for dizziness, seizures, syncope, speech difficulty, weakness and numbness.   Hematological:  Negative for adenopathy.   Psychiatric/Behavioral:  Negative for confusion.      Objective:      Physical Exam  Constitutional:       Appearance: She is well-developed.      Comments: Well-nourished well-hydrated afebrile nonicteric elderly white female.  She is sitting comfortably in the chair.  She is breathing normally.  She appears to be oriented x3.  She can relate her history and answer questions appropriately.  The family is with her and aunts additional information.   HENT:      Head: Normocephalic.   Eyes:      Pupils: Pupils are equal, round, and reactive to light.   Neck:      Thyroid: No thyromegaly.      Trachea: No tracheal deviation.   Cardiovascular:      Rate and Rhythm: Normal rate and regular rhythm.      Heart sounds: Normal heart sounds.   Pulmonary:      Effort: Pulmonary effort is normal.      Breath sounds: Normal breath sounds.   Abdominal:      General: Bowel sounds are normal. There is no distension.      Palpations: Abdomen is soft. There is no mass.      Tenderness: There is abdominal tenderness. There is no right CVA tenderness, left CVA tenderness, guarding or rebound.      Hernia: No hernia is present.   Musculoskeletal:         General: Normal range of motion.      Cervical back: Normal range of motion and neck supple.      Comments: She ambulates slowly.  She can go go from the sitting the standing position.   Lymphadenopathy:      Cervical: No cervical adenopathy.   Skin:     General: Skin is warm and dry.   Neurological:      Mental Status:  She is alert and oriented to person, place, and time.      Cranial Nerves: No cranial nerve deficit.   Psychiatric:         Behavior: Behavior normal.         Plan:       Closed fracture of sacrum, unspecified portion of sacrum, initial encounter    Encounter for screening colonoscopy  She has sigmoiditis.  She is referred to the surgeon for evaluation of the lower abdominal pain.  She will need colonoscopy.  Additionally she has an abnormal gallbladder possibly cholecystitis with sludge.  She has not followed up with the biliary scan.  Additionally she has had right hip replacement and has pain and has a sacral fracture and she is referred to the orthopedist.  She will continue her reflux regimen current medications vitamins and minerals.

## 2022-09-09 ENCOUNTER — TELEPHONE (OUTPATIENT)
Dept: GASTROENTEROLOGY | Facility: CLINIC | Age: 72
End: 2022-09-09
Payer: MEDICARE

## 2022-09-09 NOTE — TELEPHONE ENCOUNTER
----- Message from Elyse Lim LPN sent at 9/8/2022  4:58 PM CDT -----  Please contact patient.   ----- Message -----  From: Demetris Lechuga  Sent: 9/8/2022   4:57 PM CDT  To: Christopher Vergara Staff    Type: Needs Medical Advice  Who Called:  Pt    Best Call Back Number: [phone]     Additional Information: Sts she needs a call from a nurse,  Sts the information from the office was not enough and if they don't get the right information they would have call for termination.  Please advise -- Thank you

## 2022-09-09 NOTE — TELEPHONE ENCOUNTER
I called the patient. LVM stating someone would call to schedule her biliary scan and to call back if she had any questions.    ----- Message -----  From: Ian White MD  Sent: 8/25/2022  10:27 AM CDT  To: Christopher Sosa Staff    Ultrasound shows sludge in the gallbladder.  She may have cholecystitis.  Schedule biliary scan.  Then make appointment.

## 2022-09-12 ENCOUNTER — OFFICE VISIT (OUTPATIENT)
Dept: ORTHOPEDICS | Facility: CLINIC | Age: 72
End: 2022-09-12
Payer: MEDICARE

## 2022-09-12 ENCOUNTER — TELEPHONE (OUTPATIENT)
Dept: GASTROENTEROLOGY | Facility: CLINIC | Age: 72
End: 2022-09-12
Payer: MEDICARE

## 2022-09-12 VITALS — HEIGHT: 60 IN | WEIGHT: 91.25 LBS | BODY MASS INDEX: 17.91 KG/M2

## 2022-09-12 DIAGNOSIS — S32.10XA CLOSED FRACTURE OF SACRUM, UNSPECIFIED PORTION OF SACRUM, INITIAL ENCOUNTER: ICD-10-CM

## 2022-09-12 PROCEDURE — 99999 PR PBB SHADOW E&M-EST. PATIENT-LVL III: CPT | Mod: PBBFAC,,, | Performed by: ORTHOPAEDIC SURGERY

## 2022-09-12 PROCEDURE — 99213 OFFICE O/P EST LOW 20 MIN: CPT | Mod: PBBFAC,PN | Performed by: ORTHOPAEDIC SURGERY

## 2022-09-12 PROCEDURE — 99214 OFFICE O/P EST MOD 30 MIN: CPT | Mod: S$PBB,,, | Performed by: ORTHOPAEDIC SURGERY

## 2022-09-12 PROCEDURE — 99214 PR OFFICE/OUTPT VISIT, EST, LEVL IV, 30-39 MIN: ICD-10-PCS | Mod: S$PBB,,, | Performed by: ORTHOPAEDIC SURGERY

## 2022-09-12 PROCEDURE — 99999 PR PBB SHADOW E&M-EST. PATIENT-LVL III: ICD-10-PCS | Mod: PBBFAC,,, | Performed by: ORTHOPAEDIC SURGERY

## 2022-09-12 NOTE — PROGRESS NOTES
CC:  72-year-old female follows up with her right hip.  She had a hemiarthroplasty done for a femoral neck fracture by another surgeon about 10 months ago.  She occasionally has some groin pain but was also having some back pain.  She  she had a bone scan ordered by another physician that showed a possible insufficiency fracture of the right sacrum so she presents today to discuss those bone scan results.    Past Medical History:   Diagnosis Date    Anxiety     GERD (gastroesophageal reflux disease)        Past Surgical History:   Procedure Laterality Date    FRACTURE SURGERY      HEMIARTHROPLASTY OF HIP Right 2021    Procedure: HEMIARTHROPLASTY, HIP;  Surgeon: Dontae Lopez MD;  Location: [facility];  Service: Orthopedics;  Laterality: Right;       Current Outpatient Medications on File Prior to Visit   Medication Sig Dispense Refill    alprazolam (XANAX XR) 1 MG Tb24 Take 0.5 mg by mouth once daily.      ALPRAZolam (XANAX) 0.25 MG tablet 0.25 mg.      ALPRAZolam (XANAX) 0.5 MG tablet SMARTSI Tablet(s) By Mouth Every 12 Hours PRN      azelastine (ASTELIN) 137 mcg (0.1 %) nasal spray TAKE 2 PUFFS EACH NOSTRIL TWICE A DAY      cholestyramine (QUESTRAN) 4 gram packet   = 1 packet, Oral, BID, # 60 EA, 0 Refill(s), Maintenance, Pharmacy: [facility], GERD (gastroesophageal reflux disease)  Diarrhea  Stricture esophagus, 158, cm, 22 9:22:00 CDT, Height/Length Measured, 44.8, kg, 22 9:22:00 CDT, Weight Do...      cyproheptadine (PERIACTIN) 4 mg tablet Take 0.125 mg by mouth 4 (four) times daily before meals and nightly. DISSOLVE 1 TABLET ORALLY EVERY 4 HOURS BEFORE MEALS      famotidine (PEPCID) 40 MG tablet 40 mg.      fluticasone propionate (FLONASE) 50 mcg/actuation nasal spray SMARTSI Puff(s) Both Nares Twice Daily      HYDROcodone-acetaminophen (NORCO) 5-325 mg per tablet Take 1 tablet by mouth every 6 (six) hours as needed for Pain. 28 tablet 0    hyoscyamine (LEVSIN/SL) 0.125 mg Subl  Place 0.125 mg under the tongue every 4 (four) hours as needed. Take sublingual before meals      lidocaine HCl 2% (XYLOCAINE) 2 % Soln Take 5 mLs by mouth every 4 (four) hours. 100 mL 0    omeprazole (PRILOSEC) 40 MG capsule Take 40 mg by mouth 2 (two) times daily.      rivaroxaban (XARELTO) 10 mg Tab Take 1 tablet (10 mg total) by mouth daily with dinner or evening meal. for 5 days 5 tablet 0    traMADoL (ULTRAM) 50 mg tablet Take 50 mg by mouth 3 (three) times daily as needed.       No current facility-administered medications on file prior to visit.       ROS:    Constitution: Denies chills, fever, and sweats.  HENT: Denies headaches or blurry vision.  Cardiovascular: Denies chest pain or irregular heart beat.  Respiratory: Denies cough or shortness of breath.  Gastrointestinal: Denies abdominal pain, nausea, or vomiting.  Genitourinary:  Denies urinary incontinence, bladder and kidney issues  Musculoskeletal:  Denies muscle cramps.  Neurological: Denies dizziness or focal weakness.  Psychiatric/Behavioral: Normal mental status.  Hematologic/Lymphatic: Denies bleeding problem or easy bruising/bleeding.  Skin: Denies rash or suspicious lesions.    Physical examination     Gen - No acute distress, well nourished, well groomed   Eyes - Extraoccular motions intact, pupils equally round and reactive to light and accommodation   ENT - normocephalic, atruamtic, oropharynx clear   Neck - Supple, no abnormal masses   Cardiovascular - regular rate and rhythm   Pulmonary - clear to auscultation bilaterally, no wheezes, ronchi, or rales   Abdomen - soft, non-tender, non-distended, positive bowel sounds   Psych - The patient is alert and oriented x3 with normal mood and affect    Examination of the Right Lower Extremity    Skin is intact throughout  Motor in intact EHL,FHL,TA,corry  +2 DP/PT  Sensation LT intact D/P/1st    Examination of the Right Hip    C-Sign negative  Logroll negative  Stenchfield negative    Pain with ROM  negative    ROM:    Flexion   120  Extension   30  Abduction   45  Adduction   20  External Rotation 45  Internal Rotation 35    Flexion contracture negative    FADIR negative  FADER negative    Tenderness to palpation over lateral and posterolateral greater tochanter negative  Mild tenderness noted over the right sacrum    Bone scan images were examined and personally interpreted by me.  There is increased uptake over the right sacral ala.  No increased uptake over the right hip    Dx:  Status post right hip hemiarthroplasty, stable.  Insufficiency fracture of the right sacral ala    Plan:  I reviewed the results with the patient and her son-in-law.  The insufficiency fracture should heal with protected weight-bearing and enough time.  The hip hemiarthroplasty is stable.  She can follow up p.r.n..

## 2022-09-12 NOTE — TELEPHONE ENCOUNTER
Attempted to contact Hrei Jansen to discuss  [facility] paperwork .    Left voice mail to return our call at [phone] on [phone] (home).    Pauly Zhu MA

## 2022-09-12 NOTE — TELEPHONE ENCOUNTER
----- Message from Maria Elena Callahan sent at 9/12/2022 12:43 PM CDT -----  Type: Patient Call Back         Who called: Pt  son in law          What is the request in detail: Pt son in law is requesting to speak with Pauly Zhu MA in regards to his Mother         Can the clinic reply by [facility]?         Would the patient rather a call back or a response via [facility]?         Best call back number:         Additional Information:           Thank You

## 2022-09-12 NOTE — TELEPHONE ENCOUNTER
----- Message from Maria Elena Callahan sent at 9/12/2022 12:46 PM CDT -----  Type: Patient Call Back         Who called: Pt son in law Ru Mahan          What is the request in detail: Pt son in law called in regarding wanting to speak with Pauly Zhu MA if possible I regards to his mother          Can the clinic reply by [facility]?no          Would the patient rather a call back or a response via [facility]?call back          Best call back number:[phone] (mobile) Ru Mahan         Additional Information:           Thank You

## 2022-09-12 NOTE — TELEPHONE ENCOUNTER
Attempted to contact Heri Jansen to discuss  paperwork .    Left voice mail to return our call at [phone] on [phone] (home).    Pauly Zhu MA

## 2022-09-13 ENCOUNTER — TELEPHONE (OUTPATIENT)
Dept: GASTROENTEROLOGY | Facility: CLINIC | Age: 72
End: 2022-09-13
Payer: MEDICARE

## 2022-09-13 NOTE — TELEPHONE ENCOUNTER
----- Message from Nuha Ford sent at 9/12/2022  4:48 PM CDT -----  .Type:  Patient Call Back    Who Called: PT       Does the patient know what this is regarding?: THE PAPERWORK TO EXTEND HER LEAVE FROM WORK. PT STATED THAT THE PAPERWORK NEEDED TO BE IN TODAY SHE'S IN JEOPARDY OF LOSING HER JOB IF THIS ISN'T SENT TO THEM PLEASE REACH OUT TO PT I DID LET HER KNOW THE OFFICE DID REACH OUT TO HER     Would the patient rather a call back YES     Best Call Back Number:  [phone]    Additional Information: Thank You

## 2022-09-19 ENCOUNTER — TELEPHONE (OUTPATIENT)
Dept: GASTROENTEROLOGY | Facility: CLINIC | Age: 72
End: 2022-09-19
Payer: MEDICARE

## 2022-09-19 NOTE — TELEPHONE ENCOUNTER
----- Message from Mily Uirbene sent at 9/19/2022  3:48 PM CDT -----  Contact: pt at [phone]  Type: Needs Medical Advice  Who Called:  pt  Best Call Back Number: [phone]  Additional Information: pt is calling the office to have an ending date for when she could return to work sent to [facility]. Please call back to advise.

## 2022-09-23 ENCOUNTER — TELEPHONE (OUTPATIENT)
Dept: GASTROENTEROLOGY | Facility: CLINIC | Age: 72
End: 2022-09-23
Payer: MEDICARE

## 2022-09-23 NOTE — TELEPHONE ENCOUNTER
----- Message from Sunshine Odom sent at 9/23/2022 10:58 AM CDT -----  Patient Call Back    Who Called: PT     What is the reqeust in detail: PT CALLING TO SPEAK WITH SOMEONE REGARDING HER RECEIVING AN ENDING DATE FOR HER TO RETURN BACK TO WORK. THE PT STATED THAT HER JOB IS IN [employer] BECAUSE THEY ARE WAITING ON AN ENDING DATE. THE PT STATED THAT SHE NEED TO KEEP HER JOB. THE PT WOULD LIKE TO SPEAK WITH SOMEONE REGARDING HER CONCERNS.       Can the clinic reply by [facility]?    Best Call Back Number: [phone]

## 2022-09-23 NOTE — TELEPHONE ENCOUNTER
Attempted to contact Heri Jansen to discuss  paperwork .    Unable to leave message on phone - no voicemail or mailbox full on [phone] (home).    Pauly Zhu MA

## 2022-09-27 ENCOUNTER — OFFICE VISIT (OUTPATIENT)
Dept: SURGERY | Facility: CLINIC | Age: 72
End: 2022-09-27
Payer: MEDICARE

## 2022-09-27 VITALS
RESPIRATION RATE: 17 BRPM | WEIGHT: 92 LBS | HEIGHT: 60 IN | SYSTOLIC BLOOD PRESSURE: 129 MMHG | HEART RATE: 80 BPM | BODY MASS INDEX: 18.06 KG/M2 | DIASTOLIC BLOOD PRESSURE: 73 MMHG | OXYGEN SATURATION: 98 %

## 2022-09-27 DIAGNOSIS — R10.13 EPIGASTRIC PAIN: ICD-10-CM

## 2022-09-27 DIAGNOSIS — R19.7 DIARRHEA, UNSPECIFIED TYPE: Primary | ICD-10-CM

## 2022-09-27 DIAGNOSIS — R14.0 BLOATING: ICD-10-CM

## 2022-09-27 DIAGNOSIS — K52.9 SIGMOIDITIS: ICD-10-CM

## 2022-09-27 DIAGNOSIS — Z86.010 PERSONAL HISTORY OF COLONIC POLYPS: ICD-10-CM

## 2022-09-27 PROCEDURE — 99215 OFFICE O/P EST HI 40 MIN: CPT | Mod: PBBFAC | Performed by: SURGERY

## 2022-09-27 PROCEDURE — 99205 PR OFFICE/OUTPT VISIT, NEW, LEVL V, 60-74 MIN: ICD-10-PCS | Mod: S$PBB,,, | Performed by: SURGERY

## 2022-09-27 PROCEDURE — 99205 OFFICE O/P NEW HI 60 MIN: CPT | Mod: S$PBB,,, | Performed by: SURGERY

## 2022-09-27 PROCEDURE — 99999 PR PBB SHADOW E&M-EST. PATIENT-LVL V: ICD-10-PCS | Mod: PBBFAC,,, | Performed by: SURGERY

## 2022-09-27 PROCEDURE — 99999 PR PBB SHADOW E&M-EST. PATIENT-LVL V: CPT | Mod: PBBFAC,,, | Performed by: SURGERY

## 2022-09-27 RX ORDER — SODIUM CHLORIDE 9 MG/ML
INJECTION, SOLUTION INTRAVENOUS CONTINUOUS
Status: CANCELLED | OUTPATIENT
Start: 2022-09-27

## 2022-09-27 NOTE — PROGRESS NOTES
Patient, Heri Jansen, was instructed on Miralax bowel prep. Patient was given instructions on pre-op, araceli-op, and post-op scheduled appointments.  Patient received blue folder containing all written instructions.      Keegan Erazo MA

## 2022-09-27 NOTE — H&P
[facility] H&P Note    Subjective:       Patient ID: Heri Jansen is a 72 y.o. female.    Chief Complaint:  Doc I need some help.  HPI:  Heri Jansen is a 72 y.o. female history of anxiety gastroesophageal reflux disease presents today as a new patient referral from Dr. White for evaluation of diarrhea, abnormal CT scan abdomen pelvis, abnormal gallbladder study.  Patient states that she started having diarrhea after back injection associated with hip fracture.  She underwent hip hemiarthroplasty.  Subsequently went downhill.  Started having diarrhea, profuse.  Impacted her ability to perform her job.  She took 2 bottles of Imodium 1 day to stop having bowel movements to allow her to do her job.  This caused other troubles.  She presented to Dr. Crespo's clinic for evaluation.  Underwent CT scan enterography.  Showed evidence of diverticulitis.  Patient has had improving diarrhea however no resolution.  She also underwent gallbladder workup which showed evidence of sludge.  Patient endorses symptoms of bloating, worsening indigestion, epigastric pain associated greasy fried fatty food ingestion.  She now presents today surgery Clinic for evaluation above.    Past Medical History:   Diagnosis Date    Anxiety     GERD (gastroesophageal reflux disease)      Past Surgical History:   Procedure Laterality Date    FRACTURE SURGERY      HEMIARTHROPLASTY OF HIP Right 11/13/2021    Procedure: HEMIARTHROPLASTY, HIP;  Surgeon: Dontae Lopez MD;  Location: [facility];  Service: Orthopedics;  Laterality: Right;     Family History   Problem Relation Age of Onset    Heart disease Mother      Social History     Socioeconomic History    Marital status:    Tobacco Use    Smoking status: Never    Smokeless tobacco: Never   Substance and Sexual Activity    Alcohol use: Yes     Comment: a bottle of wine or 6 glasses of beer daily    Drug use: No       Current Outpatient Medications   Medication Sig Dispense  Refill    alprazolam (XANAX XR) 1 MG Tb24 Take 0.5 mg by mouth once daily.      ALPRAZolam (XANAX) 0.25 MG tablet 0.25 mg.      ALPRAZolam (XANAX) 0.5 MG tablet SMARTSI Tablet(s) By Mouth Every 12 Hours PRN      azelastine (ASTELIN) 137 mcg (0.1 %) nasal spray TAKE 2 PUFFS EACH NOSTRIL TWICE A DAY      cholestyramine (QUESTRAN) 4 gram packet   = 1 packet, Oral, BID, # 60 EA, 0 Refill(s), Maintenance, Pharmacy: [facility], GERD (gastroesophageal reflux disease)  Diarrhea  Stricture esophagus, 158, cm, 22 9:22:00 CDT, Height/Length Measured, 44.8, kg, 22 9:22:00 CDT, Weight Do...      cyproheptadine (PERIACTIN) 4 mg tablet Take 0.125 mg by mouth 4 (four) times daily before meals and nightly. DISSOLVE 1 TABLET ORALLY EVERY 4 HOURS BEFORE MEALS      famotidine (PEPCID) 40 MG tablet 40 mg.      fluticasone propionate (FLONASE) 50 mcg/actuation nasal spray SMARTSI Puff(s) Both Nares Twice Daily      HYDROcodone-acetaminophen (NORCO) 5-325 mg per tablet Take 1 tablet by mouth every 6 (six) hours as needed for Pain. 28 tablet 0    hyoscyamine (LEVSIN/SL) 0.125 mg Subl Place 0.125 mg under the tongue every 4 (four) hours as needed. Take sublingual before meals      lidocaine HCl 2% (XYLOCAINE) 2 % Soln Take 5 mLs by mouth every 4 (four) hours. 100 mL 0    omeprazole (PRILOSEC) 40 MG capsule Take 40 mg by mouth 2 (two) times daily.      traMADoL (ULTRAM) 50 mg tablet Take 50 mg by mouth 3 (three) times daily as needed.       No current facility-administered medications for this visit.     Review of patient's allergies indicates:  No Known Allergies    Review of Systems   Constitutional:  Negative for activity change, appetite change, chills and fever.   HENT:  Negative for congestion, dental problem and ear discharge.    Eyes:  Negative for discharge and itching.   Respiratory:  Negative for apnea, choking and chest tightness.    Cardiovascular:  Negative for chest pain and leg swelling.    Gastrointestinal:  Positive for abdominal pain and diarrhea. Negative for abdominal distention, anal bleeding, constipation and nausea.   Endocrine: Negative for cold intolerance and heat intolerance.   Genitourinary:  Negative for difficulty urinating and dyspareunia.   Musculoskeletal:  Negative for arthralgias and back pain.   Skin:  Negative for color change and pallor.   Neurological:  Negative for dizziness and facial asymmetry.   Hematological:  Negative for adenopathy. Does not bruise/bleed easily.   Psychiatric/Behavioral:  Negative for agitation and behavioral problems.      Objective:      Vitals:    09/27/22 1150   BP: 129/73   Pulse: 80   Resp: 17   SpO2: 98%   Weight: 41.7 kg (92 lb)   Height: 5' (1.524 m)     Physical Exam  Constitutional:       General: She is not in acute distress.     Appearance: She is well-developed.   HENT:      Head: Normocephalic and atraumatic.   Eyes:      Pupils: Pupils are equal, round, and reactive to light.   Neck:      Thyroid: No thyromegaly.   Cardiovascular:      Rate and Rhythm: Normal rate and regular rhythm.   Pulmonary:      Effort: Pulmonary effort is normal.      Breath sounds: Normal breath sounds.   Abdominal:      General: Bowel sounds are normal. There is no distension.      Palpations: Abdomen is soft.      Tenderness: There is no abdominal tenderness.   Musculoskeletal:         General: No deformity. Normal range of motion.      Cervical back: Normal range of motion and neck supple.   Skin:     General: Skin is warm.      Capillary Refill: Capillary refill takes less than 2 seconds.      Findings: No erythema.   Neurological:      Mental Status: She is alert and oriented to person, place, and time.      Cranial Nerves: No cranial nerve deficit.   Psychiatric:         Behavior: Behavior normal.     CT scan reviewed.  Evidence of sigmoiditis.  Likely diverticular in origin.  Chronic?     Assessment:       1. Diarrhea, unspecified type    2. Sigmoiditis     3. Bloating    4. Epigastric pain    5. Personal history of colonic polyps          Plan:   Diarrhea, unspecified type  -     Full code; Standing  -     Place in Outpatient; Standing  -     Case Request Operating Room: COLONOSCOPY  -     Vital signs; Standing  -     Insert peripheral IV; Standing  -     Notify Physician; Standing  -     Diet NPO; Standing  -     Place MINDY hose; Standing  -     Place sequential compression device; Standing    Sigmoiditis  -     Ambulatory referral/consult to General Surgery  -     Full code; Standing  -     Place in Outpatient; Standing  -     Case Request Operating Room: COLONOSCOPY  -     Vital signs; Standing  -     Insert peripheral IV; Standing  -     Notify Physician; Standing  -     Diet NPO; Standing  -     Place MINDY hose; Standing  -     Place sequential compression device; Standing    Bloating  -     H. pylori antigen, stool; Future; Expected date: 09/27/2022  -     Pancreatic elastase, fecal; Future; Expected date: 09/27/2022  -     Fecal fat, qualitative; Future; Expected date: 09/27/2022  -     Occult blood x 1, stool; Future; Expected date: 09/27/2022  -     WBC, Stool; Future; Expected date: 09/27/2022  -     Rotavirus antigen, stool; Future; Expected date: 09/27/2022  -     Adenovirus Antigen EIA, Stool; Future; Expected date: 09/27/2022  -     Calprotectin, Stool; Future; Expected date: 09/27/2022  -     Giardia / Cryptosporidum, EIA; Future; Expected date: 09/27/2022  -     Stool Exam-Ova,Cysts,Parasites; Future; Expected date: 09/27/2022  -     Clostridium difficile EIA; Future; Expected date: 09/27/2022  -     Stool culture; Future; Expected date: 09/27/2022    Epigastric pain  -     H. pylori antigen, stool; Future; Expected date: 09/27/2022  -     Pancreatic elastase, fecal; Future; Expected date: 09/27/2022  -     Fecal fat, qualitative; Future; Expected date: 09/27/2022  -     Occult blood x 1, stool; Future; Expected date: 09/27/2022  -     WBC, Stool;  Future; Expected date: 09/27/2022  -     Rotavirus antigen, stool; Future; Expected date: 09/27/2022  -     Adenovirus Antigen EIA, Stool; Future; Expected date: 09/27/2022  -     Calprotectin, Stool; Future; Expected date: 09/27/2022  -     Giardia / Cryptosporidum, EIA; Future; Expected date: 09/27/2022  -     Stool Exam-Ova,Cysts,Parasites; Future; Expected date: 09/27/2022  -     Clostridium difficile EIA; Future; Expected date: 09/27/2022  -     Stool culture; Future; Expected date: 09/27/2022    Personal history of colonic polyps  -     Full code; Standing  -     Place in Outpatient; Standing  -     Case Request Operating Room: COLONOSCOPY  -     Vital signs; Standing  -     Insert peripheral IV; Standing  -     Notify Physician; Standing  -     Diet NPO; Standing  -     Place MINDY hose; Standing  -     Place sequential compression device; Standing    Other orders  -     0.9%  NaCl infusion        Medical Decision Making/Counseling:  Diarrhea.  Uncertain etiology.  Recommendation be full gamut of stool studies to include inflammatory markers as well as C diff.      Personal history of colon polyps.  Sigmoiditis on CT scan.  Diverticulitis.  6+ weeks since flare.  Still symptoms of diarrhea.  Will recommend patient undergo colonoscopy further evaluation.  Pending colonoscopy findings will determine further course.      Biliary sludge with bloating epigastric pain etcetera.  Very likely gallbladder origin.  Patient instructed to monitor symptoms associated with greasy fatty fried food ingestion.  If symptoms associated with these likely gallbladder origin may necessitate cholecystectomy.    Follow-up once workup complete.    Patient instructed that best way to communicate with my office staff is for patient to get on the [facility] [employer] patient portal to expedite communication and communication issues that may occur.  Patient was given instructions on how to get on the portal.  I encouraged patient to obtain portal  access as well.  Ultimately it is up to the patient to obtain access.  Patient voiced understanding.

## 2022-09-30 ENCOUNTER — TELEPHONE (OUTPATIENT)
Dept: SURGERY | Facility: CLINIC | Age: 72
End: 2022-09-30
Payer: MEDICARE

## 2022-09-30 NOTE — TELEPHONE ENCOUNTER
Duplicate    Writer spoke to patient and let her know that we are looking at an estimated date to return to work around 12/19/2022. Patient expressed verbal understanding. [place] paperwork faxed to [employer].

## 2022-09-30 NOTE — TELEPHONE ENCOUNTER
----- Message from Michael Pedro sent at 9/30/2022  3:18 PM CDT -----  Regarding: pt return call  Who Called: ROSALINE GERARDO [[id number]]    Who Left Message for Patient:N/A    Does the patient know what this is regarding?    Best Call Back Number:[phone] (home)       Additional Information:

## 2022-09-30 NOTE — TELEPHONE ENCOUNTER
Writer spoke to patient and let her know that we are looking at an estimated date to return to work around 12/19/2022. Patient expressed verbal understanding. [place] paperwork faxed to [employer].

## 2022-09-30 NOTE — TELEPHONE ENCOUNTER
----- Message from Sabra Gutierrez sent at 9/30/2022  2:17 PM CDT -----  Contact: [phone]  Type: Needs Medical Advice  Who Called:  Pt     Best Call Back Number: [phone] Home     Additional Information: Pt needs a fax sent to her work showing the ending date of her leave from work.   Pls call back and advise  Pt sated she left a form at the office on Sep 27th

## 2022-10-03 ENCOUNTER — LAB VISIT (OUTPATIENT)
Dept: LAB | Facility: HOSPITAL | Age: 72
End: 2022-10-03
Attending: SURGERY
Payer: MEDICARE

## 2022-10-03 DIAGNOSIS — R10.13 EPIGASTRIC PAIN: ICD-10-CM

## 2022-10-03 DIAGNOSIS — R14.0 BLOATING: ICD-10-CM

## 2022-10-03 LAB
C DIFF GDH STL QL: NEGATIVE
C DIFF TOX A+B STL QL IA: NEGATIVE
OB PNL STL: NEGATIVE
RV AG STL QL IA.RAPID: NEGATIVE
WBC #/AREA STL HPF: ABNORMAL /[HPF]

## 2022-10-03 PROCEDURE — 89055 LEUKOCYTE ASSESSMENT FECAL: CPT | Performed by: SURGERY

## 2022-10-03 PROCEDURE — 87449 NOS EACH ORGANISM AG IA: CPT | Performed by: SURGERY

## 2022-10-03 PROCEDURE — 82705 FATS/LIPIDS FECES QUAL: CPT | Performed by: SURGERY

## 2022-10-03 PROCEDURE — 83993 ASSAY FOR CALPROTECTIN FECAL: CPT | Performed by: SURGERY

## 2022-10-03 PROCEDURE — 82272 OCCULT BLD FECES 1-3 TESTS: CPT | Performed by: SURGERY

## 2022-10-03 PROCEDURE — 87046 STOOL CULTR AEROBIC BACT EA: CPT | Mod: 59 | Performed by: SURGERY

## 2022-10-03 PROCEDURE — 87338 HPYLORI STOOL AG IA: CPT | Performed by: SURGERY

## 2022-10-03 PROCEDURE — 82656 EL-1 FECAL QUAL/SEMIQ: CPT | Performed by: SURGERY

## 2022-10-03 PROCEDURE — 87045 FECES CULTURE AEROBIC BACT: CPT | Performed by: SURGERY

## 2022-10-03 PROCEDURE — 87427 SHIGA-LIKE TOXIN AG IA: CPT | Mod: 59 | Performed by: SURGERY

## 2022-10-03 PROCEDURE — 87425 ROTAVIRUS AG IA: CPT | Performed by: SURGERY

## 2022-10-03 PROCEDURE — 87329 GIARDIA AG IA: CPT | Performed by: SURGERY

## 2022-10-04 LAB
CRYPTOSP AG STL QL IA: NEGATIVE
G LAMBLIA AG STL QL IA: NEGATIVE

## 2022-10-05 LAB
E COLI SXT1 STL QL IA: NEGATIVE
E COLI SXT2 STL QL IA: NEGATIVE
O+P STL MICRO: NORMAL

## 2022-10-06 LAB — ELASTASE 1, FECAL: >500 MCG/G

## 2022-10-07 LAB — BACTERIA STL CULT: NORMAL

## 2022-10-08 LAB
FAT STL QL: NORMAL
NEUTRAL FAT STL QL: NORMAL

## 2022-10-10 LAB — CALPROTECTIN STL-MCNT: <27.1 MCG/G

## 2022-10-11 LAB
H PYLORI AG STL QL IA: NOT DETECTED
SPECIMEN SOURCE: NORMAL

## 2022-10-13 ENCOUNTER — OFFICE VISIT (OUTPATIENT)
Dept: SURGERY | Facility: CLINIC | Age: 72
End: 2022-10-13
Payer: MEDICARE

## 2022-10-13 VITALS
DIASTOLIC BLOOD PRESSURE: 69 MMHG | BODY MASS INDEX: 18.26 KG/M2 | SYSTOLIC BLOOD PRESSURE: 122 MMHG | HEART RATE: 91 BPM | WEIGHT: 93 LBS | HEIGHT: 60 IN | OXYGEN SATURATION: 98 % | RESPIRATION RATE: 17 BRPM

## 2022-10-13 DIAGNOSIS — R93.89 ABNORMAL CT SCAN: ICD-10-CM

## 2022-10-13 DIAGNOSIS — K82.8 GALLBLADDER SLUDGE: Primary | ICD-10-CM

## 2022-10-13 PROCEDURE — 99999 PR PBB SHADOW E&M-EST. PATIENT-LVL V: CPT | Mod: PBBFAC,,, | Performed by: SURGERY

## 2022-10-13 PROCEDURE — 99215 OFFICE O/P EST HI 40 MIN: CPT | Mod: S$PBB,,, | Performed by: SURGERY

## 2022-10-13 PROCEDURE — 99215 OFFICE O/P EST HI 40 MIN: CPT | Mod: PBBFAC | Performed by: SURGERY

## 2022-10-13 PROCEDURE — 99215 PR OFFICE/OUTPT VISIT, EST, LEVL V, 40-54 MIN: ICD-10-PCS | Mod: S$PBB,,, | Performed by: SURGERY

## 2022-10-13 PROCEDURE — 99999 PR PBB SHADOW E&M-EST. PATIENT-LVL V: ICD-10-PCS | Mod: PBBFAC,,, | Performed by: SURGERY

## 2022-10-13 RX ORDER — SODIUM CHLORIDE 9 MG/ML
INJECTION, SOLUTION INTRAVENOUS CONTINUOUS
Status: CANCELLED | OUTPATIENT
Start: 2022-10-13

## 2022-10-13 NOTE — NURSING
Patient, Heri Jansen, was instructed on Miralax bowel prep. Patient was given instructions on pre-op, araceli-op, and post-op scheduled appointments.  Patient received blue folder containing all written instructions.      Dixie Brar LPN

## 2022-10-13 NOTE — H&P
[facility] H&P Note    Subjective:       Patient ID: Heri Jansen is a 72 y.o. female.    Chief Complaint:  Doc I need some help.  HPI:  Heri Jansen is a 72 y.o. female history of anxiety gastroesophageal reflux disease presents today as a new patient referral from Dr. White for evaluation of diarrhea, abnormal CT scan abdomen pelvis, abnormal gallbladder study.  Patient states that she started having diarrhea after back injection associated with hip fracture.  She underwent hip hemiarthroplasty.  Subsequently went downhill.  Started having diarrhea, profuse.  Impacted her ability to perform her job.  She took 2 bottles of Imodium 1 day to stop having bowel movements to allow her to do her job.  This caused other troubles.  She presented to Dr. Crespo's clinic for evaluation.  Underwent CT scan enterography.  Showed evidence of diverticulitis.  Patient has had improving diarrhea however no resolution.  She also underwent gallbladder workup which showed evidence of sludge.  Patient endorses symptoms of bloating, worsening indigestion, epigastric pain associated greasy fried fatty food ingestion.  She now presents today surgery Clinic for evaluation above.    10/13/2022  Patient presents today for follow-up examination.  Continued improvement in diarrhea however no complete resolution.  Soft stools at best now couple of times a day.  CT scan previously showed sigmoid colitis.  Further evaluation with colonoscopy indicated.  Further patient with continued issues of epigastric pain bloating etcetera associated greasy fried food ingestion.  She desires to proceed with gallbladder surgery as well.  She presents today for follow-up examination.    Past Medical History:   Diagnosis Date    Anxiety     GERD (gastroesophageal reflux disease)      Past Surgical History:   Procedure Laterality Date    FRACTURE SURGERY      HEMIARTHROPLASTY OF HIP Right 11/13/2021    Procedure: HEMIARTHROPLASTY, HIP;   Surgeon: Dontae Lopez MD;  Location: [facility];  Service: Orthopedics;  Laterality: Right;     Family History   Problem Relation Age of Onset    Heart disease Mother      Social History     Socioeconomic History    Marital status:    Tobacco Use    Smoking status: Never    Smokeless tobacco: Never   Substance and Sexual Activity    Alcohol use: Yes     Comment: a bottle of wine or 6 glasses of beer daily    Drug use: No       Current Outpatient Medications   Medication Sig Dispense Refill    alprazolam (XANAX XR) 1 MG Tb24 Take 0.5 mg by mouth once daily.      ALPRAZolam (XANAX) 0.25 MG tablet 0.25 mg.      ALPRAZolam (XANAX) 0.5 MG tablet SMARTSI Tablet(s) By Mouth Every 12 Hours PRN      azelastine (ASTELIN) 137 mcg (0.1 %) nasal spray TAKE 2 PUFFS EACH NOSTRIL TWICE A DAY      cholestyramine (QUESTRAN) 4 gram packet   = 1 packet, Oral, BID, # 60 EA, 0 Refill(s), Maintenance, Pharmacy: [facility], GERD (gastroesophageal reflux disease)  Diarrhea  Stricture esophagus, 158, cm, 22 9:22:00 CDT, Height/Length Measured, 44.8, kg, 22 9:22:00 CDT, Weight Do...      cyproheptadine (PERIACTIN) 4 mg tablet Take 0.125 mg by mouth 4 (four) times daily before meals and nightly. DISSOLVE 1 TABLET ORALLY EVERY 4 HOURS BEFORE MEALS      famotidine (PEPCID) 40 MG tablet 40 mg.      fluticasone propionate (FLONASE) 50 mcg/actuation nasal spray SMARTSI Puff(s) Both Nares Twice Daily      HYDROcodone-acetaminophen (NORCO) 5-325 mg per tablet Take 1 tablet by mouth every 6 (six) hours as needed for Pain. 28 tablet 0    hyoscyamine (LEVSIN/SL) 0.125 mg Subl Place 0.125 mg under the tongue every 4 (four) hours as needed. Take sublingual before meals      lidocaine HCl 2% (XYLOCAINE) 2 % Soln Take 5 mLs by mouth every 4 (four) hours. 100 mL 0    omeprazole (PRILOSEC) 40 MG capsule Take 40 mg by mouth 2 (two) times daily.      traMADoL (ULTRAM) 50 mg tablet Take 50 mg by mouth 3 (three) times daily as  needed.       Current Facility-Administered Medications   Medication Dose Route Frequency Provider Last Rate Last Admin    ceFOXItin (MEFOXIN) 2 g in dextrose 5 % 50 mL IVPB  2 g Intravenous On Call Procedure Lex Morton MD         Review of patient's allergies indicates:  No Known Allergies    Review of Systems   Constitutional:  Negative for activity change, appetite change, chills and fever.   HENT:  Negative for congestion, dental problem and ear discharge.    Eyes:  Negative for discharge and itching.   Respiratory:  Negative for apnea, choking and chest tightness.    Cardiovascular:  Negative for chest pain and leg swelling.   Gastrointestinal:  Positive for abdominal pain and diarrhea. Negative for abdominal distention, anal bleeding, constipation and nausea.   Endocrine: Negative for cold intolerance and heat intolerance.   Genitourinary:  Negative for difficulty urinating and dyspareunia.   Musculoskeletal:  Negative for arthralgias and back pain.   Skin:  Negative for color change and pallor.   Neurological:  Negative for dizziness and facial asymmetry.   Hematological:  Negative for adenopathy. Does not bruise/bleed easily.   Psychiatric/Behavioral:  Negative for agitation and behavioral problems.      Objective:      Vitals:    10/13/22 1045   BP: 122/69   Pulse: 91   Resp: 17   SpO2: 98%   Weight: 42.2 kg (93 lb)   Height: 5' (1.524 m)     Physical Exam  Constitutional:       General: She is not in acute distress.     Appearance: She is well-developed.   HENT:      Head: Normocephalic and atraumatic.   Eyes:      Pupils: Pupils are equal, round, and reactive to light.   Neck:      Thyroid: No thyromegaly.   Cardiovascular:      Rate and Rhythm: Normal rate and regular rhythm.   Pulmonary:      Effort: Pulmonary effort is normal.      Breath sounds: Normal breath sounds.   Abdominal:      General: Bowel sounds are normal. There is no distension.      Palpations: Abdomen is soft.       Tenderness: There is no abdominal tenderness.   Musculoskeletal:         General: No deformity. Normal range of motion.      Cervical back: Normal range of motion and neck supple.   Skin:     General: Skin is warm.      Capillary Refill: Capillary refill takes less than 2 seconds.      Findings: No erythema.   Neurological:      Mental Status: She is alert and oriented to person, place, and time.      Cranial Nerves: No cranial nerve deficit.   Psychiatric:         Behavior: Behavior normal.     CT scan reviewed.  Evidence of sigmoiditis.  Likely diverticular in origin.  Chronic?    Ultrasound gallbladder sludge.     Assessment:       1. Gallbladder sludge    2. Abnormal CT scan          Plan:   Gallbladder sludge  -     Case Request Operating Room: CHOLECYSTECTOMY-LAPAROSCOPIC  -     Vital signs; Standing  -     Insert peripheral IV; Standing  -     Verify beta-blocker dose taken within 24 hours if patient is prescribed beta-blocker; Standing  -     Height and weight; Standing  -     Intake and output; Standing  -     Verify discontinuation of antithrombotics; Standing  -     POCT glucose; Standing  -     Verify blood consent; Standing  -     Verify consent; Standing  -     Clip and Prep Abdomen Zyphoid to Pubis; Standing  -     Chlorhexidine (CHG) 2% Wipes; Standing  -     Hibiclens shower; Standing  -     Hibiclens shower; Standing  -     Notify Physician; Standing  -     Diet NPO; Standing  -     Place MINDY hose; Standing  -     Place sequential compression device; Standing    Abnormal CT scan  -     Full code; Standing  -     Place in Outpatient; Standing  -     Case Request Operating Room: COLONOSCOPY  -     Vital signs; Standing  -     Insert peripheral IV; Standing  -     Notify Physician; Standing  -     Diet NPO; Standing  -     Place MINDY hose; Standing  -     Place sequential compression device; Standing    Other orders  -     0.9%  NaCl infusion  -     0.9%  NaCl infusion  -     IP VTE LOW RISK PATIENT;  Standing  -     ceFOXItin (MEFOXIN) 2 g in dextrose 5 % 50 mL IVPB      Medical Decision Making/Counseling:  Diarrhea. Personal history of colon polyps.  Sigmoiditis on CT scan.  Diverticulitis.  6+ weeks since flare.  Still symptoms of diarrhea.  Will recommend patient undergo colonoscopy further evaluation.  Colonoscopy indicated at this point with RANDOM COLON BIOPSIES given diarrhea.    Biliary sludge with bloating epigastric pain etcetera.  Patient with biliary colic.  Will recommend patient undergo cholecystectomy at this point given continued symptomatology with greasy fried fatty food ingestion.  Risk benefits were discussed.  Patient voiced understanding risk benefits wished to proceed near future.  Follow-up once workup complete.    Risks and benefits of cholecystectomy were discussed with the patient. Benefits the patient could receive would be the resolution of gallbladder attacks causing pain, cramps, nausea vomiting etc.  Benefits also include eliminating the risk of the patient presenting through the ER with acute cholecystitis and needing an emergent operation.  Risks of cholecystectomy were also included in our discussion.  Risks include injury to the common bile duct (liver drain tube) occurring in 1 in 250 cholecystectomy is performed across the [place].  I discussed with the patient that if this were to occur she could need further surgeries to include likely a hepaticojejunostomy.  I discussed there is a possibility of transfer for surgical therapy for this, if it were to occur.  I also discussed the risk of conversion to an open procedure (cold fashion surgery, how gallbladders were taken out previously) to occur in 5% of cholecystectomies.  I discussed that in 100% cholecystectomies I started with the small incision (laparoscopic technique).  Reason for conversion to an open procedure is related to bleeding, significant scarring or inflammation, inability to obtain a critical view which  could lead to injury of surrounding structures to include the stomach, duodenum, IVC, common bile duct, etc.  I discussed there is also a possibility of postoperatively needing reoperation.  Additionally, I have discussed with the patient the possibilities of biliary leak after laparoscopic cholecystectomy.  Literature would suggest a 2% leak rate.  This could necessitate a postoperative ERCP or even postoperative procedure including diagnostic laparoscopy for drainage or percutaneous drainage.  There is also the intrinsic risks of any surgery to include bleeding and infection.  Patient understands all the above risks and benefits and wishes to proceed in the near future with laparoscopic versus open cholecystectomy.  Counseling time 60 minutes in clinic.  I drew a picture for the patient of the foregut liver biliary anatomy and tree for further understanding while in clinic.    Patient instructed that best way to communicate with my office staff is for patient to get on the [facility] patient portal to expedite communication and communication issues that may occur.  Patient was given instructions on how to get on the portal.  I encouraged patient to obtain portal access as well.  Ultimately it is up to the patient to obtain access.  Patient voiced understanding.

## 2022-11-07 ENCOUNTER — ANESTHESIA EVENT (OUTPATIENT)
Dept: SURGERY | Facility: HOSPITAL | Age: 72
End: 2022-11-07
Payer: MEDICARE

## 2022-11-07 ENCOUNTER — ANESTHESIA (OUTPATIENT)
Dept: SURGERY | Facility: HOSPITAL | Age: 72
End: 2022-11-07
Payer: MEDICARE

## 2022-11-07 ENCOUNTER — HOSPITAL ENCOUNTER (OUTPATIENT)
Facility: HOSPITAL | Age: 72
Discharge: HOME | End: 2022-11-07
Attending: SURGERY | Admitting: SURGERY
Payer: MEDICARE

## 2022-11-07 DIAGNOSIS — Z86.010 PERSONAL HISTORY OF COLONIC POLYPS: ICD-10-CM

## 2022-11-07 DIAGNOSIS — R93.89 ABNORMAL CT SCAN: ICD-10-CM

## 2022-11-07 DIAGNOSIS — K82.8 GALLBLADDER SLUDGE: ICD-10-CM

## 2022-11-07 DIAGNOSIS — K52.9 SIGMOIDITIS: ICD-10-CM

## 2022-11-07 DIAGNOSIS — R19.7 DIARRHEA, UNSPECIFIED TYPE: ICD-10-CM

## 2022-11-07 PROCEDURE — D9220A PRA ANESTHESIA: Mod: CRNA,,, | Performed by: NURSE ANESTHETIST, CERTIFIED REGISTERED

## 2022-11-07 PROCEDURE — 88305 TISSUE EXAM BY PATHOLOGIST: ICD-10-PCS | Mod: 26,,, | Performed by: STUDENT IN AN ORGANIZED HEALTH CARE EDUCATION/TRAINING PROGRAM

## 2022-11-07 PROCEDURE — D9220A PRA ANESTHESIA: ICD-10-PCS | Mod: ANES,,, | Performed by: ANESTHESIOLOGY

## 2022-11-07 PROCEDURE — 88305 TISSUE EXAM BY PATHOLOGIST: CPT | Mod: 59 | Performed by: STUDENT IN AN ORGANIZED HEALTH CARE EDUCATION/TRAINING PROGRAM

## 2022-11-07 PROCEDURE — 37000008 HC ANESTHESIA 1ST 15 MINUTES: Performed by: SURGERY

## 2022-11-07 PROCEDURE — 37000009 HC ANESTHESIA EA ADD 15 MINS: Performed by: SURGERY

## 2022-11-07 PROCEDURE — 45380 COLONOSCOPY AND BIOPSY: CPT | Mod: ,,, | Performed by: SURGERY

## 2022-11-07 PROCEDURE — 88305 TISSUE EXAM BY PATHOLOGIST: CPT | Mod: 26,,, | Performed by: STUDENT IN AN ORGANIZED HEALTH CARE EDUCATION/TRAINING PROGRAM

## 2022-11-07 PROCEDURE — 63600175 PHARM REV CODE 636 W HCPCS: Performed by: ANESTHESIOLOGY

## 2022-11-07 PROCEDURE — 45380 PR COLONOSCOPY,BIOPSY: ICD-10-PCS | Mod: ,,, | Performed by: SURGERY

## 2022-11-07 PROCEDURE — 45380 COLONOSCOPY AND BIOPSY: CPT | Performed by: SURGERY

## 2022-11-07 PROCEDURE — D9220A PRA ANESTHESIA: Mod: ANES,,, | Performed by: ANESTHESIOLOGY

## 2022-11-07 PROCEDURE — 63600175 PHARM REV CODE 636 W HCPCS: Performed by: NURSE ANESTHETIST, CERTIFIED REGISTERED

## 2022-11-07 PROCEDURE — 25000003 PHARM REV CODE 250: Performed by: ANESTHESIOLOGY

## 2022-11-07 PROCEDURE — D9220A PRA ANESTHESIA: ICD-10-PCS | Mod: CRNA,,, | Performed by: NURSE ANESTHETIST, CERTIFIED REGISTERED

## 2022-11-07 RX ORDER — SODIUM CHLORIDE 9 MG/ML
INJECTION, SOLUTION INTRAVENOUS CONTINUOUS
Status: DISCONTINUED | OUTPATIENT
Start: 2022-11-07 | End: 2022-11-07 | Stop reason: HOSPADM

## 2022-11-07 RX ORDER — SODIUM CHLORIDE, SODIUM LACTATE, POTASSIUM CHLORIDE, CALCIUM CHLORIDE 600; 310; 30; 20 MG/100ML; MG/100ML; MG/100ML; MG/100ML
INJECTION, SOLUTION INTRAVENOUS CONTINUOUS
Status: DISCONTINUED | OUTPATIENT
Start: 2022-11-07 | End: 2022-11-07 | Stop reason: HOSPADM

## 2022-11-07 RX ORDER — LIDOCAINE HYDROCHLORIDE 10 MG/ML
1 INJECTION, SOLUTION EPIDURAL; INFILTRATION; INTRACAUDAL; PERINEURAL ONCE
Status: COMPLETED | OUTPATIENT
Start: 2022-11-07 | End: 2022-11-07

## 2022-11-07 RX ORDER — ONDANSETRON 2 MG/ML
4 INJECTION INTRAMUSCULAR; INTRAVENOUS DAILY PRN
Status: DISCONTINUED | OUTPATIENT
Start: 2022-11-07 | End: 2022-11-07 | Stop reason: HOSPADM

## 2022-11-07 RX ORDER — SODIUM CHLORIDE, SODIUM LACTATE, POTASSIUM CHLORIDE, CALCIUM CHLORIDE 600; 310; 30; 20 MG/100ML; MG/100ML; MG/100ML; MG/100ML
125 INJECTION, SOLUTION INTRAVENOUS CONTINUOUS
Status: DISCONTINUED | OUTPATIENT
Start: 2022-11-07 | End: 2022-11-07 | Stop reason: HOSPADM

## 2022-11-07 RX ORDER — PROPOFOL 10 MG/ML
VIAL (ML) INTRAVENOUS
Status: DISCONTINUED | OUTPATIENT
Start: 2022-11-07 | End: 2022-11-07

## 2022-11-07 RX ADMIN — PROPOFOL 100 MG: 10 INJECTION, EMULSION INTRAVENOUS at 08:11

## 2022-11-07 RX ADMIN — SODIUM CHLORIDE, POTASSIUM CHLORIDE, SODIUM LACTATE AND CALCIUM CHLORIDE: 600; 310; 30; 20 INJECTION, SOLUTION INTRAVENOUS at 08:11

## 2022-11-07 RX ADMIN — LIDOCAINE HYDROCHLORIDE 20 MG: 10 INJECTION, SOLUTION EPIDURAL; INFILTRATION; INTRACAUDAL; PERINEURAL at 08:11

## 2022-11-07 NOTE — DISCHARGE SUMMARY
Discharge Note        SUMMARY     Admit Date: 2022    Attending Physician: Lex Morton MD     Discharge Physician: Lex Morton MD    Discharge Date: 2022 8:52 AM      Hospital Course: Patient tolerated procedure well.     Disposition: Home or Self Care    Patient Instructions:   Current Discharge Medication List        CONTINUE these medications which have NOT CHANGED    Details   !! ALPRAZolam (XANAX) 0.5 MG tablet SMARTSI Tablet(s) By Mouth Every 12 Hours PRN      azelastine (ASTELIN) 137 mcg (0.1 %) nasal spray TAKE 2 PUFFS EACH NOSTRIL TWICE A DAY      cholestyramine (QUESTRAN) 4 gram packet   = 1 packet, Oral, BID, # 60 EA, 0 Refill(s), Maintenance, Pharmacy: [facility], GERD (gastroesophageal reflux disease)  Diarrhea  Stricture esophagus, 158, cm, 22 9:22:00 CDT, Height/Length Measured, 44.8, kg, 22 9:22:00 CDT, Weight Do...      cyproheptadine (PERIACTIN) 4 mg tablet Take 0.125 mg by mouth 4 (four) times daily before meals and nightly. DISSOLVE 1 TABLET ORALLY EVERY 4 HOURS BEFORE MEALS      fluticasone propionate (FLONASE) 50 mcg/actuation nasal spray SMARTSI Puff(s) Both Nares Twice Daily      hyoscyamine (LEVSIN/SL) 0.125 mg Subl Place 0.125 mg under the tongue every 4 (four) hours as needed. Take sublingual before meals      omeprazole (PRILOSEC) 40 MG capsule Take 40 mg by mouth 2 (two) times daily.      alprazolam (XANAX XR) 1 MG Tb24 Take 0.5 mg by mouth once daily.      !! ALPRAZolam (XANAX) 0.25 MG tablet 0.25 mg.      famotidine (PEPCID) 40 MG tablet 40 mg.      HYDROcodone-acetaminophen (NORCO) 5-325 mg per tablet Take 1 tablet by mouth every 6 (six) hours as needed for Pain.  Qty: 28 tablet, Refills: 0    Comments: n/a   Associated Diagnoses: S/P right hip fracture      lidocaine HCl 2% (XYLOCAINE) 2 % Soln Take 5 mLs by mouth every 4 (four) hours.  Qty: 100 mL, Refills: 0      traMADoL (ULTRAM) 50 mg tablet Take 50 mg by mouth 3 (three) times  daily as needed.       !! - Potential duplicate medications found. Please discuss with provider.          Discharge Procedure Orders (must include Diet, Follow-up, Activity):   Discharge Procedure Orders (must include Diet, Follow-up, Activity)   Diet general     Call MD for:  temperature >100.4     Call MD for:  persistent nausea and vomiting     Call MD for:  severe uncontrolled pain     Call MD for:  difficulty breathing, headache or visual disturbances     Call MD for:  redness, tenderness, or signs of infection (pain, swelling, redness, odor or green/yellow discharge around incision site)     Call MD for:  persistent dizziness or light-headedness        Follow Up:  Follow up as scheduled.  Resume routine diet.  Activity as tolerated.    No driving day of procedure.

## 2022-11-07 NOTE — TRANSFER OF CARE
Anesthesia Transfer of Care Note    Patient: Heri GARCIA Vermeal    Procedure(s) Performed: Procedure(s) (LRB):  COLONOSCOPY (N/A)    Patient location: PACU    Anesthesia Type: general    Transport from OR: Transported from OR on room air with adequate spontaneous ventilation    Post pain: adequate analgesia    Post assessment: no apparent anesthetic complications and tolerated procedure well    Post vital signs: stable    Level of consciousness: awake, alert and oriented    Nausea/Vomiting: no nausea/vomiting    Complications: none    Transfer of care protocol was followed      Last vitals:   Visit Vitals  BP (!) 147/75 (BP Location: Left arm, Patient Position: Sitting)   Pulse 81   Temp 36.7 °C (98.1 °F) (Oral)   Resp 17   Ht 5' (1.524 m)   Wt 41.7 kg (92 lb)   SpO2 100%   Breastfeeding No   BMI 17.97 kg/m²

## 2022-11-07 NOTE — ANESTHESIA POSTPROCEDURE EVALUATION
Anesthesia Post Evaluation    Patient: Heri Jansen    Procedure(s) Performed: Procedure(s) (LRB):  COLONOSCOPY (N/A)    Final Anesthesia Type: general      Patient location during evaluation: PACU  Patient participation: Yes- Able to Participate  Level of consciousness: awake and awake and alert  Post-procedure vital signs: reviewed and stable  Pain management: adequate  Airway patency: patent    PONV status at discharge: No PONV  Anesthetic complications: no      Cardiovascular status: blood pressure returned to baseline  Respiratory status: unassisted and spontaneous ventilation  Hydration status: euvolemic  Follow-up not needed.          Vitals Value Taken Time   /78 11/07/22 0944   Temp 36.4 °C (97.6 °F) 11/07/22 0900   Pulse 69 11/07/22 0943   Resp 20 11/07/22 0943   SpO2 99 % 11/07/22 0943         Event Time   Out of Recovery 09:41:15         Pain/Jorge Score: Jorge Score: 10 (11/7/2022  9:31 AM)  Modified Jorge Score: 20 (11/7/2022  9:47 AM)

## 2022-11-07 NOTE — ANESTHESIA PREPROCEDURE EVALUATION
11/07/2022  Heri Jansen is a 72 y.o., female.      Pre-op Assessment    I have reviewed the Patient Summary Reports.     I have reviewed the Nursing Notes.    I have reviewed the Medications.     Review of Systems  Anesthesia Hx:  No problems with previous Anesthesia  Neg history of prior surgery. Denies Family Hx of Anesthesia complications.   Denies Personal Hx of Anesthesia complications.   Social:  Non-Smoker    Hematology/Oncology:  Hematology Normal   Oncology Normal     EENT/Dental:EENT/Dental Normal   Cardiovascular:  Cardiovascular Normal     Pulmonary:  Pulmonary Normal    Renal/:  Renal/ Normal     Hepatic/GI:   GERD, poorly controlled    Musculoskeletal:  Musculoskeletal Normal    Neurological:  Neurology Normal    Endocrine:  Endocrine Normal    Dermatological:  Skin Normal    Psych:  Psychiatric Normal           Physical Exam  General: Alert    Airway:  Mallampati: II   Mouth Opening: Normal  TM Distance: Normal  Neck ROM: Normal ROM    Dental:  Intact    Chest/Lungs:  Clear to auscultation, Normal Respiratory Rate    Heart:  Rate: Normal    Abdomen:  Normal        Anesthesia Plan  Type of Anesthesia, risks & benefits discussed:    Anesthesia Type: Gen ETT  Post Op Pain Control Plan: multimodal analgesia  Airway Plan: Direct, Post-Induction  Informed Consent: Informed consent signed with the Patient and all parties understand the risks and agree with anesthesia plan.  All questions answered. Patient consented to blood products? No  ASA Score: 2  Day of Surgery Review of History & Physical: H&P Update referred to the surgeon/provider.    Ready For Surgery From Anesthesia Perspective.     .

## 2022-11-07 NOTE — H&P
[facility] H&P Note    Subjective:       Patient ID: Heri Jansen is a 72 y.o. female.    Chief Complaint:  Doc I need some help.  HPI:  Heri Jansen is a 72 y.o. female history of anxiety gastroesophageal reflux disease presents today as a new patient referral from Dr. White for evaluation of diarrhea, abnormal CT scan abdomen pelvis, abnormal gallbladder study.  Patient states that she started having diarrhea after back injection associated with hip fracture.  She underwent hip hemiarthroplasty.  Subsequently went downhill.  Started having diarrhea, profuse.  Impacted her ability to perform her job.  She took 2 bottles of Imodium 1 day to stop having bowel movements to allow her to do her job.  This caused other troubles.  She presented to Dr. Crespo's clinic for evaluation.  Underwent CT scan enterography.  Showed evidence of diverticulitis.  Patient has had improving diarrhea however no resolution.  She also underwent gallbladder workup which showed evidence of sludge.  Patient endorses symptoms of bloating, worsening indigestion, epigastric pain associated greasy fried fatty food ingestion.  She now presents today surgery Clinic for evaluation above.    Past Medical History:   Diagnosis Date    Anxiety     GERD (gastroesophageal reflux disease)      Past Surgical History:   Procedure Laterality Date    FRACTURE SURGERY      HEMIARTHROPLASTY OF HIP Right 11/13/2021    Procedure: HEMIARTHROPLASTY, HIP;  Surgeon: Dontae Lopez MD;  Location: [facility];  Service: Orthopedics;  Laterality: Right;     Family History   Problem Relation Age of Onset    Heart disease Mother      Social History     Socioeconomic History    Marital status:    Tobacco Use    Smoking status: Never    Smokeless tobacco: Never   Substance and Sexual Activity    Alcohol use: Yes     Comment: a bottle of wine or 6 glasses of beer daily    Drug use: No       No current facility-administered medications for this  encounter.     Review of patient's allergies indicates:   Allergen Reactions    Levaquin [levofloxacin] Hives       Review of Systems   Constitutional:  Negative for activity change, appetite change, chills and fever.   HENT:  Negative for congestion, dental problem and ear discharge.    Eyes:  Negative for discharge and itching.   Respiratory:  Negative for apnea, choking and chest tightness.    Cardiovascular:  Negative for chest pain and leg swelling.   Gastrointestinal:  Positive for abdominal pain and diarrhea. Negative for abdominal distention, anal bleeding, constipation and nausea.   Endocrine: Negative for cold intolerance and heat intolerance.   Genitourinary:  Negative for difficulty urinating and dyspareunia.   Musculoskeletal:  Negative for arthralgias and back pain.   Skin:  Negative for color change and pallor.   Neurological:  Negative for dizziness and facial asymmetry.   Hematological:  Negative for adenopathy. Does not bruise/bleed easily.   Psychiatric/Behavioral:  Negative for agitation and behavioral problems.      Objective:      There were no vitals filed for this visit.    Physical Exam  Constitutional:       General: She is not in acute distress.     Appearance: She is well-developed.   HENT:      Head: Normocephalic and atraumatic.   Eyes:      Pupils: Pupils are equal, round, and reactive to light.   Neck:      Thyroid: No thyromegaly.   Cardiovascular:      Rate and Rhythm: Normal rate and regular rhythm.   Pulmonary:      Effort: Pulmonary effort is normal.      Breath sounds: Normal breath sounds.   Abdominal:      General: Bowel sounds are normal. There is no distension.      Palpations: Abdomen is soft.      Tenderness: There is no abdominal tenderness.   Musculoskeletal:         General: No deformity. Normal range of motion.      Cervical back: Normal range of motion and neck supple.   Skin:     General: Skin is warm.      Capillary Refill: Capillary refill takes less than 2  seconds.      Findings: No erythema.   Neurological:      Mental Status: She is alert and oriented to person, place, and time.      Cranial Nerves: No cranial nerve deficit.   Psychiatric:         Behavior: Behavior normal.     CT scan reviewed.  Evidence of sigmoiditis.  Likely diverticular in origin.  Chronic?       Medical Decision Making/Counseling:  Diarrhea.  Uncertain etiology.  Recommendation be full gamut of stool studies to include inflammatory markers as well as C diff.      Personal history of colon polyps.  Sigmoiditis on CT scan.  Diverticulitis.  6+ weeks since flare.  Still symptoms of diarrhea.  Will recommend patient undergo colonoscopy further evaluation.  Pending colonoscopy findings will determine further course.      Biliary sludge with bloating epigastric pain etcetera.  Very likely gallbladder origin.  Patient instructed to monitor symptoms associated with greasy fatty fried food ingestion.  If symptoms associated with these likely gallbladder origin may necessitate cholecystectomy.    Follow-up once workup complete.    Patient instructed that best way to communicate with my office staff is for patient to get on the [facility] patient portal to expedite communication and communication issues that may occur.  Patient was given instructions on how to get on the portal.  I encouraged patient to obtain portal access as well.  Ultimately it is up to the patient to obtain access.  Patient voiced understanding.

## 2022-11-07 NOTE — [employer] PATIENT INSTRUCTIONS
Discharge Summary/Instructions after an Endoscopic Procedure  Patient Name: Heri Jansen  Patient MRN: [id number]  Patient YOB: 1950  Monday, November 7, 2022  Lex Morton MD  RESTRICTIONS:  During your procedure today, you received medications for sedation.  These   medications may affect your judgment, balance and coordination.  Therefore,   for 24 hours, you have the following restrictions:   - DO NOT drive a car, operate machinery, make legal/financial decisions,   sign important papers or drink alcohol.    ACTIVITY:  Today: no heavy lifting, straining or running due to procedural   sedation/anesthesia.  The following day: return to full activity including work.  DIET:  Eat and drink normally unless instructed otherwise.     TREATMENT FOR COMMON SIDE EFFECTS:  - Mild abdominal pain, nausea, belching, bloating or excessive gas:  rest,   eat lightly and use a heating pad.  - Sore Throat: treat with throat lozenges and/or gargle with warm salt   water.  - Because air was used during the procedure, expelling large amounts of air   from your rectum or belching is normal.  - If a bowel prep was taken, you may not have a bowel movement for 1-3 days.    This is normal.  SYMPTOMS TO WATCH FOR AND REPORT TO YOUR PHYSICIAN:  1. Abdominal pain or bloating, other than gas cramps.  2. Chest pain.  3. Back pain.  4. Signs of infection such as: chills or fever occurring within 24 hours   after the procedure.  5. Rectal bleeding, which would show as bright red, maroon, or black stools.   (A tablespoon of blood from the rectum is not serious, especially if   hemorrhoids are present.)  6. Vomiting.  7. Weakness or dizziness.  GO DIRECTLY TO THE NEAREST EMERGENCY ROOM IF YOU HAVE ANY OF THE FOLLOWING:      Difficulty breathing              Chills and/or fever over 101 F   Persistent vomiting and/or vomiting blood   Severe abdominal pain   Severe chest pain   Black, tarry stools   Bleeding- more than one  tablespoon   Any other symptom or condition that you feel may need urgent attention  Your doctor recommends these additional instructions:  If any biopsies were taken, your doctors clinic will contact you in 1 to 2   weeks with any results.  - Patient has a contact number available for emergencies.  The signs and   symptoms of potential delayed complications were discussed with the   patient.  Return to normal activities tomorrow.  Written discharge   instructions were provided to the patient.   - Resume previous diet.   - Discharge patient to home (ambulatory).   - Continue present medications.   - Repeat colonoscopy in 5 years for surveillance.   - Return to GI clinic PRN.  For questions, problems or results please call your physician - Lex Morton MD at Work:  [phone].  [facility] EMERGENCY ROOM PHONE NUMBER: [phone]  IF A COMPLICATION OR EMERGENCY SITUATION ARISES AND YOU ARE UNABLE TO REACH   YOUR PHYSICIAN - GO DIRECTLY TO THE EMERGENCY ROOM.  MD Lex Hoff MD  11/7/2022 8:55:03 AM  This report has been verified and signed electronically.  Dear patient,  As a result of recent federal legislation (The Federal Cures Act), you may   receive lab or pathology results from your procedure in your [facility]   account before your physician is able to contact you. Your physician or   their representative will relay the results to you with their   recommendations at their soonest availability.  Thank you,  [employer]

## 2022-11-08 VITALS
HEIGHT: 60 IN | OXYGEN SATURATION: 99 % | DIASTOLIC BLOOD PRESSURE: 78 MMHG | HEART RATE: 69 BPM | SYSTOLIC BLOOD PRESSURE: 145 MMHG | RESPIRATION RATE: 20 BRPM | BODY MASS INDEX: 18.06 KG/M2 | TEMPERATURE: 98 F | WEIGHT: 92 LBS

## 2022-11-11 LAB
FINAL PATHOLOGIC DIAGNOSIS: NORMAL
GROSS: NORMAL
Lab: NORMAL

## 2022-11-29 ENCOUNTER — OFFICE VISIT (OUTPATIENT)
Dept: SURGERY | Facility: CLINIC | Age: 72
End: 2022-11-29
Payer: MEDICARE

## 2022-11-29 ENCOUNTER — ANESTHESIA EVENT (OUTPATIENT)
Dept: SURGERY | Facility: HOSPITAL | Age: 72
End: 2022-11-29
Payer: MEDICARE

## 2022-11-29 VITALS
HEART RATE: 81 BPM | HEIGHT: 60 IN | DIASTOLIC BLOOD PRESSURE: 63 MMHG | BODY MASS INDEX: 18.26 KG/M2 | SYSTOLIC BLOOD PRESSURE: 137 MMHG | OXYGEN SATURATION: 96 % | WEIGHT: 93 LBS

## 2022-11-29 DIAGNOSIS — K83.8 BILIARY SLUDGE: ICD-10-CM

## 2022-11-29 DIAGNOSIS — K52.831 COLLAGENOUS COLITIS: Primary | ICD-10-CM

## 2022-11-29 PROCEDURE — 99999 PR PBB SHADOW E&M-EST. PATIENT-LVL IV: CPT | Mod: PBBFAC,,, | Performed by: SURGERY

## 2022-11-29 PROCEDURE — 99214 OFFICE O/P EST MOD 30 MIN: CPT | Mod: PBBFAC | Performed by: SURGERY

## 2022-11-29 PROCEDURE — 99999 PR PBB SHADOW E&M-EST. PATIENT-LVL IV: ICD-10-PCS | Mod: PBBFAC,,, | Performed by: SURGERY

## 2022-11-29 PROCEDURE — 99215 OFFICE O/P EST HI 40 MIN: CPT | Mod: S$PBB,,, | Performed by: SURGERY

## 2022-11-29 PROCEDURE — 99215 PR OFFICE/OUTPT VISIT, EST, LEVL V, 40-54 MIN: ICD-10-PCS | Mod: S$PBB,,, | Performed by: SURGERY

## 2022-11-29 NOTE — PROGRESS NOTES
Patient given pre-op instruction and bottle of Hibiclens handed to patient. Instructions given for all pre-op, araceli-op, and post-op appointments. Patient given blue folder with all written instructions. Patient instructed that providers can not prescribe pain medications before surgery. Stated understanding.

## 2022-11-29 NOTE — H&P
[facility] Surgery H&P Note    Subjective:       Patient ID: Heri Jansen is a 72 y.o. female.    Chief Complaint:  I am ready to have my gallbladder removed.  Follow-up diarrhea.  HPI:  Heri Jansen is a 72 y.o. female history of anxiety gastroesophageal reflux disease presents today for follow-up evaluation.  Patient initially presented to me with evidence of abnormal gallbladder workup as well as diarrhea after orthopedic procedure.  Patient took significant amounts of NSAID products after orthopedic procedure.  Patient underwent stool studies, which showed evidence of no infection, white blood cell elevated as well as calprotectin present.  Patient underwent colonoscopy which showed evidence of random biopsy showing collagenous colitis, microscopic colitis.  Patient admits to continue to take Aleve as needed for pain.  Symptoms have improved however not completely resolved from the diarrhea standpoint.  Still having symptoms of bloating epigastric symptomatology etcetera related to gallbladder.  She presents today for follow-up evaluation.    Past Medical History:   Diagnosis Date    Anxiety     GERD (gastroesophageal reflux disease)      Past Surgical History:   Procedure Laterality Date    COLONOSCOPY N/A 11/7/2022    Procedure: COLONOSCOPY;  Surgeon: Lex Morton MD;  Location: [facility];  Service: General;  Laterality: N/A;    FRACTURE SURGERY      HEMIARTHROPLASTY OF HIP Right 11/13/2021    Procedure: HEMIARTHROPLASTY, HIP;  Surgeon: Dontae Lopez MD;  Location: [facility];  Service: Orthopedics;  Laterality: Right;     Family History   Problem Relation Age of Onset    Heart disease Mother      Social History     Socioeconomic History    Marital status:    Tobacco Use    Smoking status: Never    Smokeless tobacco: Never   Substance and Sexual Activity    Alcohol use: Yes     Comment: a bottle of wine or 6 glasses of beer daily    Drug use: No    Sexual activity: Not Currently        Current Outpatient Medications   Medication Sig Dispense Refill    alprazolam (XANAX XR) 1 MG Tb24 Take 0.5 mg by mouth once daily.      ALPRAZolam (XANAX) 0.25 MG tablet 0.25 mg.      ALPRAZolam (XANAX) 0.5 MG tablet SMARTSI Tablet(s) By Mouth Every 12 Hours PRN      azelastine (ASTELIN) 137 mcg (0.1 %) nasal spray TAKE 2 PUFFS EACH NOSTRIL TWICE A DAY      cholestyramine (QUESTRAN) 4 gram packet   = 1 packet, Oral, BID, # 60 EA, 0 Refill(s), Maintenance, Pharmacy: [facility], GERD (gastroesophageal reflux disease)  Diarrhea  Stricture esophagus, 158, cm, 22 9:22:00 CDT, Height/Length Measured, 44.8, kg, 22 9:22:00 CDT, Weight Do...      cyproheptadine (PERIACTIN) 4 mg tablet Take 0.125 mg by mouth 4 (four) times daily before meals and nightly. DISSOLVE 1 TABLET ORALLY EVERY 4 HOURS BEFORE MEALS      fluticasone propionate (FLONASE) 50 mcg/actuation nasal spray SMARTSI Puff(s) Both Nares Twice Daily      HYDROcodone-acetaminophen (NORCO) 5-325 mg per tablet Take 1 tablet by mouth every 6 (six) hours as needed for Pain. 28 tablet 0    hyoscyamine (LEVSIN/SL) 0.125 mg Subl Place 0.125 mg under the tongue every 4 (four) hours as needed. Take sublingual before meals      lidocaine HCl 2% (XYLOCAINE) 2 % Soln Take 5 mLs by mouth every 4 (four) hours. 100 mL 0    omeprazole (PRILOSEC) 40 MG capsule Take 40 mg by mouth 2 (two) times daily.      traMADoL (ULTRAM) 50 mg tablet Take 50 mg by mouth 3 (three) times daily as needed.      famotidine (PEPCID) 40 MG tablet 40 mg.       Current Facility-Administered Medications   Medication Dose Route Frequency Provider Last Rate Last Admin    ceFOXItin (MEFOXIN) 2 g in dextrose 5 % 50 mL IVPB  2 g Intravenous On Call Procedure Lex Morton MD         Review of patient's allergies indicates:   Allergen Reactions    Levaquin [levofloxacin] Hives       Review of Systems   Constitutional:  Negative for activity change, appetite change, chills and  fever.   HENT:  Negative for congestion, dental problem and ear discharge.    Eyes:  Negative for discharge and itching.   Respiratory:  Negative for apnea, choking and chest tightness.    Cardiovascular:  Negative for chest pain and leg swelling.   Gastrointestinal:  Positive for abdominal pain. Negative for abdominal distention, anal bleeding, constipation, diarrhea and nausea.   Endocrine: Negative for cold intolerance and heat intolerance.   Genitourinary:  Negative for difficulty urinating and dyspareunia.   Musculoskeletal:  Negative for arthralgias and back pain.   Skin:  Negative for color change and pallor.   Neurological:  Negative for dizziness and facial asymmetry.   Hematological:  Negative for adenopathy. Does not bruise/bleed easily.   Psychiatric/Behavioral:  Negative for agitation and behavioral problems.      Objective:      Vitals:    11/29/22 1005   BP: 137/63   Pulse: 81   SpO2: 96%   Weight: 42.2 kg (93 lb)   Height: 5' (1.524 m)     Physical Exam  Constitutional:       General: She is not in acute distress.     Appearance: She is well-developed.   HENT:      Head: Normocephalic and atraumatic.   Eyes:      Pupils: Pupils are equal, round, and reactive to light.   Neck:      Thyroid: No thyromegaly.   Cardiovascular:      Rate and Rhythm: Normal rate and regular rhythm.   Pulmonary:      Effort: Pulmonary effort is normal.      Breath sounds: Normal breath sounds.   Abdominal:      General: Bowel sounds are normal. There is no distension.      Palpations: Abdomen is soft.      Tenderness: There is abdominal tenderness (mild) in the epigastric area.   Musculoskeletal:         General: No deformity. Normal range of motion.      Cervical back: Normal range of motion and neck supple.   Skin:     General: Skin is warm.      Capillary Refill: Capillary refill takes less than 2 seconds.      Findings: No erythema.   Neurological:      Mental Status: She is alert and oriented to person, place, and  time.      Cranial Nerves: No cranial nerve deficit.   Psychiatric:         Behavior: Behavior normal.        Assessment:       1. Collagenous colitis    2. Biliary sludge          Plan:   Collagenous colitis    Biliary sludge      Medical Decision Making/Counseling:  Colonoscopy showed evidence of collagenous colitis, microscopic colitis likely release him for diarrhea.  NSAIDs have been associated with increased collagenous colitis risk.  Will recommend patient discontinue NSAIDs.  Use Lomotil as needed for diarrhea.  If symptoms were to worsen would recommend budesonide therapy.  Symptoms however at this point improved per the patient's account therefore no further treatment recommended for microscopic colitis which is likely related to NSAID utilization.  Discontinue NSAIDs.    Biliary sludge.  Biliary colic present.  Offered patient low-fat diet versus cholecystectomy.  Risk benefits have been discussed.  Patient voiced understanding risk benefits wished to proceed with surgical cholecystectomy.  All questions been answered to patient's satisfaction.      Risks and benefits of cholecystectomy were discussed with the patient. Benefits the patient could receive would be the resolution of gallbladder attacks causing pain, cramps, nausea vomiting etc.  Benefits also include eliminating the risk of the patient presenting through the ER with acute cholecystitis and needing an emergent operation.  Risks of cholecystectomy were also included in our discussion.  Risks include injury to the common bile duct (liver drain tube) occurring in 1 in 250 cholecystectomy is performed across the [place].  I discussed with the patient that if this were to occur she could need further surgeries to include likely a hepaticojejunostomy.  I discussed there is a possibility of transfer for surgical therapy for this, if it were to occur.  I also discussed the risk of conversion to an open procedure (cold fashion surgery, how  gallbladders were taken out previously) to occur in 5% of cholecystectomies.  I discussed that in 100% cholecystectomies I started with the small incision (laparoscopic technique).  Reason for conversion to an open procedure is related to bleeding, significant scarring or inflammation, inability to obtain a critical view which could lead to injury of surrounding structures to include the stomach, duodenum, IVC, common bile duct, etc.  I discussed there is also a possibility of postoperatively needing reoperation.  Additionally, I have discussed with the patient the possibilities of biliary leak after laparoscopic cholecystectomy.  Literature would suggest a 2% leak rate.  This could necessitate a postoperative ERCP or even postoperative procedure including diagnostic laparoscopy for drainage or percutaneous drainage.  There is also the intrinsic risks of any surgery to include bleeding and infection.  Patient understands all the above risks and benefits and wishes to proceed in the near future with laparoscopic versus open cholecystectomy.  Counseling time 60 minutes in clinic.  I drew a picture for the patient of the foregut liver biliary anatomy and tree for further understanding while in clinic.    Patient instructed that best way to communicate with my office staff is for patient to get on the [facility] patient portal to expedite communication and communication issues that may occur.  Patient was given instructions on how to get on the portal.  I encouraged patient to obtain portal access as well.  Ultimately it is up to the patient to obtain access.  Patient voiced understanding.

## 2022-11-29 NOTE — PATIENT INSTRUCTIONS
Pre-operative Instructions     Date of procedure:  11/30/2022      Pre-OP Instructions  On the night before Scrub the surgical area with Hibiclens for several minutes  On the morning of  scrub the surgical area with Hibiclens for several minutes  Do not shave or clip hair at the site of your surgery  Do not wear jewelry to the hospital.  You will have to remove it.  Leave at home so that it is not lost.    Food/Drink   Do not eat or drink after Midnight     Location of Department:   [facility] [address], [place] [address]    Parking:    Use parking lot A  Enter at the [facility] entrance  Check in with outpatient registration    Contact:   Someone from surgery will call you with a time of arrival.   If you surgery is on Monday, someone will contact you on Friday.  If you do not receive a call from surgery by 2pm the business day before your procedure, please call [phone].     Medications:   You may take your normal medications with a small sip of water except diabetic medications. Do not take any diabetic medications the morning of your procedure.     Do not take the following Medications for 3 days prior to your procedure:   Aspirin, Excedrin, BC powder or goodies powders   Ibuprofen or Motrin  Naproxen or Aleve          Transportation:   You will NOT be able to drive yourself.  You are required to have someone drive you home from the hospital due to the anesthesia.      Comments:      Post-operative Instructions      RESTRICTIONS:   During your procedure today, you received medications for sedation.     These medications may affect your judgement, balance, and coordination.     DO NOT drive a car, operate machinery, make legal/financial decisions, sign important papers or drink alcohol on day of procedure.     ACTIVITY:   After your surgery you CANNOT LIFT anything over 10 pounds, no pushing, no pulling, no bending or no strenuous exercise UNTIL released by  doctor.  Do not bath until your sutures or staples are removed.    DIET AND MEDICATIONS    May eat and drink normally unless instructed otherwise.     Continue present medications unless otherwise instructed     TREATMENT FOR COMMON SIDE EFFECTS:   Pain medication is prescribed to be taken as needed, NOT on a schedule. Use pain medication for periods of high activity and before sleep. Providers have limits on amounts of medications that they may prescribe. Use pain medication properly to assure availability.     Because air was used during your procedure you may experience mild abdominal pain, nausea, belching, bloating or excessive gas: walking, rest, eat lightly and use a heating pad to help alleviate discomfort.     Sore throat: treat with throat lozenges and/or gargle with warm salt water.     If a bowel prep was taken, you may not have a bowel movement for 1-3 days. This is normal.     IF YOU HAVE ANY OF THE SYMPTOMS BELOW, REPORT TO YOUR PHYSICIAN:     1. Excessive or unexpected pain in back or abdomen     2. Signs of infection such as: chills or fever occurring within 24 hours after the procedure.     3. Rectal bleeding, which would show as bright red, maroon, or black stools. (A tablespoon of blood from the rectum is not serious, especially if hemorrhoids are present.)     4. Vomiting     5. Weakness or dizziness.     IF YOU EXPERIENCE ANY OF THE FOLLOWING, GO DIRECTLY TO THE NEAREST EMERGENCY ROOM:  1. Difficulty breathing     2. Chills and/or fever over 101 F     3. Persistent vomiting and/or vomiting blood     4. Severe abdominal pain     5. Chest pain     6. Black, tarry stools     7. Bleeding-more than one tablespoon     8. Any other symptoms or condition that you feel may need urgent attention.       YOUR DOCTOR RECOMMENDS THESE ADDITIONAL INSTRUCTIONS:     1. If any biopsies were taken, your results will be discussed at your follow up appointment     2. Further recommendations will depend on how you  are recovering from you procedure

## 2022-11-30 ENCOUNTER — ANESTHESIA (OUTPATIENT)
Dept: SURGERY | Facility: HOSPITAL | Age: 72
End: 2022-11-30
Payer: MEDICARE

## 2022-11-30 ENCOUNTER — HOSPITAL ENCOUNTER (OUTPATIENT)
Facility: HOSPITAL | Age: 72
Discharge: HOME | End: 2022-11-30
Attending: SURGERY | Admitting: SURGERY
Payer: MEDICARE

## 2022-11-30 VITALS
RESPIRATION RATE: 16 BRPM | HEART RATE: 74 BPM | DIASTOLIC BLOOD PRESSURE: 63 MMHG | TEMPERATURE: 98 F | SYSTOLIC BLOOD PRESSURE: 116 MMHG | OXYGEN SATURATION: 96 %

## 2022-11-30 DIAGNOSIS — K82.8 GALLBLADDER SLUDGE: Primary | ICD-10-CM

## 2022-11-30 PROCEDURE — 71000033 HC RECOVERY, INTIAL HOUR: Performed by: SURGERY

## 2022-11-30 PROCEDURE — 88304 PR  SURG PATH,LEVEL III: ICD-10-PCS | Mod: 26,,, | Performed by: PATHOLOGY

## 2022-11-30 PROCEDURE — 88304 TISSUE EXAM BY PATHOLOGIST: CPT | Performed by: PATHOLOGY

## 2022-11-30 PROCEDURE — 25000003 PHARM REV CODE 250: Performed by: SURGERY

## 2022-11-30 PROCEDURE — 25000003 PHARM REV CODE 250

## 2022-11-30 PROCEDURE — 63600175 PHARM REV CODE 636 W HCPCS: Performed by: ANESTHESIOLOGY

## 2022-11-30 PROCEDURE — D9220A PRA ANESTHESIA: Mod: CRNA,,, | Performed by: NURSE ANESTHETIST, CERTIFIED REGISTERED

## 2022-11-30 PROCEDURE — 63600175 PHARM REV CODE 636 W HCPCS: Performed by: NURSE ANESTHETIST, CERTIFIED REGISTERED

## 2022-11-30 PROCEDURE — 37000008 HC ANESTHESIA 1ST 15 MINUTES: Performed by: SURGERY

## 2022-11-30 PROCEDURE — D9220A PRA ANESTHESIA: ICD-10-PCS | Mod: CRNA,,, | Performed by: NURSE ANESTHETIST, CERTIFIED REGISTERED

## 2022-11-30 PROCEDURE — 36000708 HC OR TIME LEV III 1ST 15 MIN: Performed by: SURGERY

## 2022-11-30 PROCEDURE — 88304 TISSUE EXAM BY PATHOLOGIST: CPT | Mod: 26,,, | Performed by: PATHOLOGY

## 2022-11-30 PROCEDURE — 37000009 HC ANESTHESIA EA ADD 15 MINS: Performed by: SURGERY

## 2022-11-30 PROCEDURE — 47562 PR LAP,CHOLECYSTECTOMY: ICD-10-PCS | Mod: ,,, | Performed by: SURGERY

## 2022-11-30 PROCEDURE — D9220A PRA ANESTHESIA: ICD-10-PCS | Mod: ANES,,, | Performed by: ANESTHESIOLOGY

## 2022-11-30 PROCEDURE — 27201423 OPTIME MED/SURG SUP & DEVICES STERILE SUPPLY: Performed by: SURGERY

## 2022-11-30 PROCEDURE — 25000003 PHARM REV CODE 250: Performed by: NURSE ANESTHETIST, CERTIFIED REGISTERED

## 2022-11-30 PROCEDURE — 71000039 HC RECOVERY, EACH ADD'L HOUR: Performed by: SURGERY

## 2022-11-30 PROCEDURE — 36000709 HC OR TIME LEV III EA ADD 15 MIN: Performed by: SURGERY

## 2022-11-30 PROCEDURE — D9220A PRA ANESTHESIA: Mod: ANES,,, | Performed by: ANESTHESIOLOGY

## 2022-11-30 PROCEDURE — 47562 LAPAROSCOPIC CHOLECYSTECTOMY: CPT | Mod: ,,, | Performed by: SURGERY

## 2022-11-30 PROCEDURE — 63600175 PHARM REV CODE 636 W HCPCS: Performed by: SURGERY

## 2022-11-30 PROCEDURE — 71000015 HC POSTOP RECOV 1ST HR: Performed by: SURGERY

## 2022-11-30 RX ORDER — ONDANSETRON 4 MG/1
4 TABLET, FILM COATED ORAL EVERY 6 HOURS PRN
Qty: 30 TABLET | Refills: 0 | Status: SHIPPED | OUTPATIENT
Start: 2022-11-30 | End: 2022-12-10

## 2022-11-30 RX ORDER — LIDOCAINE HYDROCHLORIDE 10 MG/ML
1 INJECTION, SOLUTION EPIDURAL; INFILTRATION; INTRACAUDAL; PERINEURAL ONCE
Status: DISCONTINUED | OUTPATIENT
Start: 2022-11-30 | End: 2022-11-30 | Stop reason: HOSPADM

## 2022-11-30 RX ORDER — PROPOFOL 10 MG/ML
VIAL (ML) INTRAVENOUS
Status: DISCONTINUED | OUTPATIENT
Start: 2022-11-30 | End: 2022-11-30

## 2022-11-30 RX ORDER — LIDOCAINE HYDROCHLORIDE 20 MG/ML
INJECTION, SOLUTION EPIDURAL; INFILTRATION; INTRACAUDAL; PERINEURAL
Status: DISCONTINUED | OUTPATIENT
Start: 2022-11-30 | End: 2022-11-30

## 2022-11-30 RX ORDER — FENTANYL CITRATE 50 UG/ML
INJECTION, SOLUTION INTRAMUSCULAR; INTRAVENOUS
Status: DISCONTINUED | OUTPATIENT
Start: 2022-11-30 | End: 2022-11-30

## 2022-11-30 RX ORDER — SODIUM CHLORIDE 9 MG/ML
INJECTION, SOLUTION INTRAVENOUS CONTINUOUS
Status: DISCONTINUED | OUTPATIENT
Start: 2022-11-30 | End: 2022-11-30 | Stop reason: HOSPADM

## 2022-11-30 RX ORDER — FAMOTIDINE 10 MG/ML
INJECTION INTRAVENOUS
Status: COMPLETED
Start: 2022-11-30 | End: 2022-11-30

## 2022-11-30 RX ORDER — ACETAMINOPHEN 10 MG/ML
INJECTION, SOLUTION INTRAVENOUS
Status: DISCONTINUED | OUTPATIENT
Start: 2022-11-30 | End: 2022-11-30

## 2022-11-30 RX ORDER — SODIUM CHLORIDE, SODIUM LACTATE, POTASSIUM CHLORIDE, CALCIUM CHLORIDE 600; 310; 30; 20 MG/100ML; MG/100ML; MG/100ML; MG/100ML
125 INJECTION, SOLUTION INTRAVENOUS CONTINUOUS
Status: DISCONTINUED | OUTPATIENT
Start: 2022-11-30 | End: 2022-11-30 | Stop reason: HOSPADM

## 2022-11-30 RX ORDER — MORPHINE SULFATE 4 MG/ML
2 INJECTION, SOLUTION INTRAMUSCULAR; INTRAVENOUS EVERY 5 MIN PRN
Status: DISCONTINUED | OUTPATIENT
Start: 2022-11-30 | End: 2022-11-30 | Stop reason: HOSPADM

## 2022-11-30 RX ORDER — DIPHENHYDRAMINE HYDROCHLORIDE 50 MG/ML
12.5 INJECTION INTRAMUSCULAR; INTRAVENOUS
Status: DISCONTINUED | OUTPATIENT
Start: 2022-11-30 | End: 2022-11-30 | Stop reason: HOSPADM

## 2022-11-30 RX ORDER — ONDANSETRON 2 MG/ML
INJECTION INTRAMUSCULAR; INTRAVENOUS
Status: DISCONTINUED | OUTPATIENT
Start: 2022-11-30 | End: 2022-11-30

## 2022-11-30 RX ORDER — ROCURONIUM BROMIDE 10 MG/ML
INJECTION, SOLUTION INTRAVENOUS
Status: DISCONTINUED | OUTPATIENT
Start: 2022-11-30 | End: 2022-11-30

## 2022-11-30 RX ORDER — FAMOTIDINE 10 MG/ML
20 INJECTION INTRAVENOUS ONCE
Status: COMPLETED | OUTPATIENT
Start: 2022-11-30 | End: 2022-11-30

## 2022-11-30 RX ORDER — ONDANSETRON 2 MG/ML
4 INJECTION INTRAMUSCULAR; INTRAVENOUS DAILY PRN
Status: DISCONTINUED | OUTPATIENT
Start: 2022-11-30 | End: 2022-11-30 | Stop reason: HOSPADM

## 2022-11-30 RX ORDER — DEXAMETHASONE SODIUM PHOSPHATE 4 MG/ML
INJECTION, SOLUTION INTRA-ARTICULAR; INTRALESIONAL; INTRAMUSCULAR; INTRAVENOUS; SOFT TISSUE
Status: DISCONTINUED | OUTPATIENT
Start: 2022-11-30 | End: 2022-11-30

## 2022-11-30 RX ORDER — MIDAZOLAM HYDROCHLORIDE 1 MG/ML
INJECTION INTRAMUSCULAR; INTRAVENOUS
Status: DISCONTINUED | OUTPATIENT
Start: 2022-11-30 | End: 2022-11-30

## 2022-11-30 RX ORDER — SODIUM CHLORIDE, SODIUM LACTATE, POTASSIUM CHLORIDE, CALCIUM CHLORIDE 600; 310; 30; 20 MG/100ML; MG/100ML; MG/100ML; MG/100ML
INJECTION, SOLUTION INTRAVENOUS CONTINUOUS
Status: DISCONTINUED | OUTPATIENT
Start: 2022-11-30 | End: 2022-11-30 | Stop reason: HOSPADM

## 2022-11-30 RX ORDER — CEFOXITIN SODIUM 2 G/50ML
2 INJECTION, SOLUTION INTRAVENOUS ONCE
Status: COMPLETED | OUTPATIENT
Start: 2022-11-30 | End: 2022-11-30

## 2022-11-30 RX ORDER — OXYCODONE AND ACETAMINOPHEN 5; 325 MG/1; MG/1
1 TABLET ORAL EVERY 6 HOURS PRN
Qty: 30 TABLET | Refills: 0 | Status: SHIPPED | OUTPATIENT
Start: 2022-11-30 | End: 2022-12-10

## 2022-11-30 RX ORDER — BUPIVACAINE HYDROCHLORIDE AND EPINEPHRINE 5; 5 MG/ML; UG/ML
INJECTION, SOLUTION EPIDURAL; INTRACAUDAL; PERINEURAL
Status: DISCONTINUED | OUTPATIENT
Start: 2022-11-30 | End: 2022-11-30 | Stop reason: HOSPADM

## 2022-11-30 RX ADMIN — FENTANYL CITRATE 50 MCG: 50 INJECTION, SOLUTION INTRAMUSCULAR; INTRAVENOUS at 07:11

## 2022-11-30 RX ADMIN — ROCURONIUM BROMIDE 30 MG: 10 INJECTION, SOLUTION INTRAVENOUS at 07:11

## 2022-11-30 RX ADMIN — FAMOTIDINE 20 MG: 10 INJECTION INTRAVENOUS at 06:11

## 2022-11-30 RX ADMIN — LIDOCAINE HYDROCHLORIDE 80 MG: 20 INJECTION, SOLUTION EPIDURAL; INFILTRATION; INTRACAUDAL; PERINEURAL at 07:11

## 2022-11-30 RX ADMIN — FAMOTIDINE 20 MG: 10 INJECTION, SOLUTION INTRAVENOUS at 06:11

## 2022-11-30 RX ADMIN — MORPHINE SULFATE 2 MG: 4 INJECTION INTRAVENOUS at 10:11

## 2022-11-30 RX ADMIN — SUGAMMADEX 50 MG: 100 INJECTION, SOLUTION INTRAVENOUS at 08:11

## 2022-11-30 RX ADMIN — SODIUM CHLORIDE, POTASSIUM CHLORIDE, SODIUM LACTATE AND CALCIUM CHLORIDE: 600; 310; 30; 20 INJECTION, SOLUTION INTRAVENOUS at 06:11

## 2022-11-30 RX ADMIN — ACETAMINOPHEN 1000 MG: 10 INJECTION INTRAVENOUS at 07:11

## 2022-11-30 RX ADMIN — MIDAZOLAM HYDROCHLORIDE 2 MG: 1 INJECTION, SOLUTION INTRAMUSCULAR; INTRAVENOUS at 07:11

## 2022-11-30 RX ADMIN — ONDANSETRON 4 MG: 2 INJECTION INTRAMUSCULAR; INTRAVENOUS at 09:11

## 2022-11-30 RX ADMIN — DEXAMETHASONE SODIUM PHOSPHATE 4 MG: 4 INJECTION, SOLUTION INTRAMUSCULAR; INTRAVENOUS at 07:11

## 2022-11-30 RX ADMIN — SODIUM CHLORIDE, POTASSIUM CHLORIDE, SODIUM LACTATE AND CALCIUM CHLORIDE: 600; 310; 30; 20 INJECTION, SOLUTION INTRAVENOUS at 08:11

## 2022-11-30 RX ADMIN — SUGAMMADEX 100 MG: 100 INJECTION, SOLUTION INTRAVENOUS at 08:11

## 2022-11-30 RX ADMIN — CEFOXITIN SODIUM 2 G: 2 INJECTION, SOLUTION INTRAVENOUS at 07:11

## 2022-11-30 RX ADMIN — GLYCOPYRROLATE 0.2 MG: 0.4 INJECTION INTRAMUSCULAR; INTRAVENOUS at 08:11

## 2022-11-30 RX ADMIN — ONDANSETRON 4 MG: 2 INJECTION INTRAMUSCULAR; INTRAVENOUS at 07:11

## 2022-11-30 RX ADMIN — PROPOFOL 100 MG: 10 INJECTION, EMULSION INTRAVENOUS at 07:11

## 2022-11-30 NOTE — ANESTHESIA PREPROCEDURE EVALUATION
11/30/2022  Heri Jansen is a 72 y.o., female.      Pre-op Assessment    I have reviewed the Patient Summary Reports.     I have reviewed the Nursing Notes. I have reviewed the NPO Status.   I have reviewed the Medications.     Review of Systems  Social:  Non-Smoker    Hematology/Oncology:  Hematology Normal   Oncology Normal     EENT/Dental:EENT/Dental Normal   Pulmonary:  Pulmonary Normal    Hepatic/GI:   GERD    Musculoskeletal:  Musculoskeletal Normal    Neurological:  Neurology Normal    Endocrine:  Endocrine Normal    Dermatological:  Skin Normal    Psych:   Psychiatric History anxiety          Physical Exam    Airway:  Mallampati: II   Mouth Opening: Normal  TM Distance: Normal  Tongue: Normal  Neck ROM: Normal ROM    Chest/Lungs:  Clear to auscultation    Heart:  Rate: Normal  Rhythm: Regular Rhythm        Anesthesia Plan  Type of Anesthesia, risks & benefits discussed:    Anesthesia Type: Gen ETT  Intra-op Monitoring Plan: Standard ASA Monitors  Post Op Pain Control Plan: multimodal analgesia  Induction:  IV  Airway Plan: Direct  Informed Consent: Informed consent signed with the Patient and all parties understand the risks and agree with anesthesia plan.  All questions answered.   ASA Score: 2  Day of Surgery Review of History & Physical: H&P Update referred to the surgeon/provider.    Ready For Surgery From Anesthesia Perspective.     .

## 2022-11-30 NOTE — ANESTHESIA PROCEDURE NOTES
Intubation    Date/Time: 11/30/2022 7:48 AM  Performed by: Lorene Byrne CRNA  Authorized by: Lorene Byrne CRNA     Intubation:     Induction:  Intravenous    Intubated:  Postinduction    Mask Ventilation:  Easy mask    Attempts:  1    Attempted By:  CRNA    Method of Intubation:  Direct    Blade:  Price 2    Laryngeal View Grade: Grade I - full view of cords      Difficult Airway Encountered?: No      Complications:  None    Airway Device:  Oral endotracheal tube    Airway Device Size:  7.0    Style/Cuff Inflation:  Cuffed (inflated to minimal occlusive pressure)    Tube secured:  21    Secured at:  The lips    Placement Verified By:  Capnometry    Complicating Factors:  None    Findings Post-Intubation:  BS equal bilateral and atraumatic/condition of teeth unchanged

## 2022-11-30 NOTE — ANESTHESIA POSTPROCEDURE EVALUATION
Anesthesia Post Evaluation    Patient: Heri Jansen    Procedure(s) Performed: Procedure(s) (LRB):  CHOLECYSTECTOMY-LAPAROSCOPIC (Bilateral)    Final Anesthesia Type: general      Patient location during evaluation: PACU  Patient participation: Yes- Able to Participate  Level of consciousness: awake and alert  Post-procedure vital signs: reviewed and stable  Pain management: adequate  Airway patency: patent    PONV status at discharge: No PONV  Anesthetic complications: no      Cardiovascular status: blood pressure returned to baseline  Respiratory status: unassisted  Hydration status: euvolemic  Follow-up not needed.          Vitals Value Taken Time   /63 11/30/22 1032   Temp 36.4 °C (97.5 °F) 11/30/22 0840   Pulse 75 11/30/22 1035   Resp 21 11/30/22 1035   SpO2 97 % 11/30/22 1035   Vitals shown include unvalidated device data.      Event Time   Out of Recovery 10:00:00         Pain/Jorge Score: Pain Rating Prior to Med Admin: 5 (11/30/2022 10:06 AM)  Jorge Score: 10 (11/30/2022  9:45 AM)  Modified Jorge Score: 20 (11/30/2022 10:43 AM)

## 2022-11-30 NOTE — DISCHARGE SUMMARY
Discharge Note        SUMMARY     Admit Date: 2022    Attending Physician: Lex Morton MD     Discharge Physician: Lex Morton MD    Discharge Date: 2022 8:28 AM      Hospital Course: Patient tolerated procedure well.     Disposition: Home or Self Care    Patient Instructions:   Current Discharge Medication List        CONTINUE these medications which have NOT CHANGED    Details   alprazolam (XANAX XR) 1 MG Tb24 Take 0.5 mg by mouth once daily.      !! ALPRAZolam (XANAX) 0.25 MG tablet 0.25 mg.      !! ALPRAZolam (XANAX) 0.5 MG tablet SMARTSI Tablet(s) By Mouth Every 12 Hours PRN      azelastine (ASTELIN) 137 mcg (0.1 %) nasal spray TAKE 2 PUFFS EACH NOSTRIL TWICE A DAY      cholestyramine (QUESTRAN) 4 gram packet   = 1 packet, Oral, BID, # 60 EA, 0 Refill(s), Maintenance, Pharmacy: [facility], GERD (gastroesophageal reflux disease)  Diarrhea  Stricture esophagus, 158, cm, 22 9:22:00 CDT, Height/Length Measured, 44.8, kg, 22 9:22:00 CDT, Weight Do...      cyproheptadine (PERIACTIN) 4 mg tablet Take 0.125 mg by mouth 4 (four) times daily before meals and nightly. DISSOLVE 1 TABLET ORALLY EVERY 4 HOURS BEFORE MEALS      famotidine (PEPCID) 40 MG tablet 40 mg.      fluticasone propionate (FLONASE) 50 mcg/actuation nasal spray SMARTSI Puff(s) Both Nares Twice Daily      HYDROcodone-acetaminophen (NORCO) 5-325 mg per tablet Take 1 tablet by mouth every 6 (six) hours as needed for Pain.  Qty: 28 tablet, Refills: 0    Comments: n/a   Associated Diagnoses: S/P right hip fracture      hyoscyamine (LEVSIN/SL) 0.125 mg Subl Place 0.125 mg under the tongue every 4 (four) hours as needed. Take sublingual before meals      omeprazole (PRILOSEC) 40 MG capsule Take 40 mg by mouth 2 (two) times daily.      traMADoL (ULTRAM) 50 mg tablet Take 50 mg by mouth 3 (three) times daily as needed.      lidocaine HCl 2% (XYLOCAINE) 2 % Soln Take 5 mLs by mouth every 4 (four) hours.  Qty:  100 mL, Refills: 0       !! - Potential duplicate medications found. Please discuss with provider.          Discharge Procedure Orders (must include Diet, Follow-up, Activity):   Discharge Procedure Orders (must include Diet, Follow-up, Activity)   Diet general     Lifting restrictions   Order Comments: No heavy lifting, pushing, pulling, bending, strenuous exercise greater than 10 lb for the next 6 weeks.     Other restrictions (specify):   Order Comments: No swimming or submersion of wounds in lakes, ponds, streams, oceans, bathtubs, etc until seen in clinic for postoperative evaluation.     No dressing needed     Call MD for:  temperature >100.4     Call MD for:  persistent nausea and vomiting     Call MD for:  severe uncontrolled pain     Call MD for:  difficulty breathing, headache or visual disturbances     Call MD for:  redness, tenderness, or signs of infection (pain, swelling, redness, odor or green/yellow discharge around incision site)     Call MD for:  persistent dizziness or light-headedness        Follow Up:  Follow up as scheduled.  Resume routine diet.  Activity as tolerated.    No driving day of procedure.

## 2022-11-30 NOTE — H&P
[facility] Surgery H&P Note    Subjective:       Patient ID: Heri Jansen is a 72 y.o. female.    Chief Complaint:  I am ready to have my gallbladder removed.  Follow-up diarrhea.  HPI:  Heri Jansen is a 72 y.o. female history of anxiety gastroesophageal reflux disease presents today for follow-up evaluation.  Patient initially presented to me with evidence of abnormal gallbladder workup as well as diarrhea after orthopedic procedure.  Patient took significant amounts of NSAID products after orthopedic procedure.  Patient underwent stool studies, which showed evidence of no infection, white blood cell elevated as well as calprotectin present.  Patient underwent colonoscopy which showed evidence of random biopsy showing collagenous colitis, microscopic colitis.  Patient admits to continue to take Aleve as needed for pain.  Symptoms have improved however not completely resolved from the diarrhea standpoint.  Still having symptoms of bloating epigastric symptomatology etcetera related to gallbladder.  She presents today for follow-up evaluation.    Past Medical History:   Diagnosis Date    Anxiety     GERD (gastroesophageal reflux disease)      Past Surgical History:   Procedure Laterality Date    COLONOSCOPY N/A 11/7/2022    Procedure: COLONOSCOPY;  Surgeon: Lex Morton MD;  Location: [facility];  Service: General;  Laterality: N/A;    FRACTURE SURGERY      HEMIARTHROPLASTY OF HIP Right 11/13/2021    Procedure: HEMIARTHROPLASTY, HIP;  Surgeon: Dontae Lopez MD;  Location: [facility];  Service: Orthopedics;  Laterality: Right;     Family History   Problem Relation Age of Onset    Heart disease Mother      Social History     Socioeconomic History    Marital status:    Tobacco Use    Smoking status: Never    Smokeless tobacco: Never   Substance and Sexual Activity    Alcohol use: Yes     Comment: a bottle of wine or 6 glasses of beer daily    Drug use: No    Sexual activity: Not Currently        Current Facility-Administered Medications   Medication Dose Route Frequency Provider Last Rate Last Admin    0.9%  NaCl infusion   Intravenous Continuous Lex Morton MD        ceFOXItin 2 g/50ml Dextrose IVPB  2 g Intravenous Once Lex Morton MD        lactated ringers infusion   Intravenous Continuous Preet Nails MD 10 mL/hr at 11/30/22 0659 New Bag at 11/30/22 0659    LIDOcaine (PF) 10 mg/ml (1%) injection 10 mg  1 mL Intradermal Once Preet Nails MD         Review of patient's allergies indicates:   Allergen Reactions    Levaquin [levofloxacin] Hives       Review of Systems   Constitutional:  Negative for activity change, appetite change, chills and fever.   HENT:  Negative for congestion, dental problem and ear discharge.    Eyes:  Negative for discharge and itching.   Respiratory:  Negative for apnea, choking and chest tightness.    Cardiovascular:  Negative for chest pain and leg swelling.   Gastrointestinal:  Positive for abdominal pain. Negative for abdominal distention, anal bleeding, constipation, diarrhea and nausea.   Endocrine: Negative for cold intolerance and heat intolerance.   Genitourinary:  Negative for difficulty urinating and dyspareunia.   Musculoskeletal:  Negative for arthralgias and back pain.   Skin:  Negative for color change and pallor.   Neurological:  Negative for dizziness and facial asymmetry.   Hematological:  Negative for adenopathy. Does not bruise/bleed easily.   Psychiatric/Behavioral:  Negative for agitation and behavioral problems.      Objective:      Vitals:    11/30/22 0652   BP: 133/72   Pulse: 80   Resp: 16   Temp: 98.1 °F (36.7 °C)   SpO2: 95%     Physical Exam  Constitutional:       General: She is not in acute distress.     Appearance: She is well-developed.   HENT:      Head: Normocephalic and atraumatic.   Eyes:      Pupils: Pupils are equal, round, and reactive to light.   Neck:      Thyroid: No thyromegaly.   Cardiovascular:       Rate and Rhythm: Normal rate and regular rhythm.   Pulmonary:      Effort: Pulmonary effort is normal.      Breath sounds: Normal breath sounds.   Abdominal:      General: Bowel sounds are normal. There is no distension.      Palpations: Abdomen is soft.      Tenderness: There is abdominal tenderness (mild) in the epigastric area.   Musculoskeletal:         General: No deformity. Normal range of motion.      Cervical back: Normal range of motion and neck supple.   Skin:     General: Skin is warm.      Capillary Refill: Capillary refill takes less than 2 seconds.      Findings: No erythema.   Neurological:      Mental Status: She is alert and oriented to person, place, and time.      Cranial Nerves: No cranial nerve deficit.   Psychiatric:         Behavior: Behavior normal.        Assessment:       1. Gallbladder sludge          Plan:   Gallbladder sludge  -     Case Request Operating Room: CHOLECYSTECTOMY-LAPAROSCOPIC  -     Vital signs; Standing  -     Verify beta-blocker dose taken within 24 hours if patient is prescribed beta-blocker; Standing  -     Height and weight; Standing  -     Intake and output; Standing  -     Verify discontinuation of antithrombotics; Standing  -     POCT glucose; Standing  -     Verify blood consent; Standing  -     Verify consent; Standing  -     Clip and Prep Abdomen Zyphoid to Pubis; Standing  -     Chlorhexidine (CHG) 2% Wipes; Standing  -     Hibiclens shower; Standing  -     Hibiclens shower; Standing  -     Notify Physician; Standing  -     Cancel: Diet NPO; Standing  -     Place MINDY hose; Standing  -     Place sequential compression device; Standing    Other orders  -     0.9%  NaCl infusion  -     IP VTE LOW RISK PATIENT; Standing  -     ceFOXItin 2 g/50ml Dextrose IVPB  -     Vital signs; Standing  -     Insert peripheral IV; Standing  -     Use 1% lidocaine at IV site; Standing  -     Nursing to confirm consent for anesthesia services; Standing  -     Diet NPO Except for:  Medication; Standing  -     lactated ringers infusion  -     Notify Anesthesiologist if Patient on Home Insulin Pump; Standing  -     LIDOcaine (PF) 10 mg/ml (1%) injection 10 mg  -     famotidine (PF) injection 20 mg  -     POCT glucose; Standing  -     Pregnancy, urine rapid; Standing  -     Admit to Phase 1 PACU, transfer to Phase 2 per protocol when indicated ; Standing  -     Vital signs; Standing  -     morphine injection 2 mg  -     ondansetron injection 4 mg  -     diphenhydrAMINE injection 12.5 mg  -     Intake and output Per protocol; Standing  -     Apply warming blanket; Standing  -     lactated ringers infusion  -     POCT glucose; Standing  -     famotidine (PF) 20 mg/2 mL injection      Medical Decision Making/Counseling:  Colonoscopy showed evidence of collagenous colitis, microscopic colitis likely release him for diarrhea.  NSAIDs have been associated with increased collagenous colitis risk.  Will recommend patient discontinue NSAIDs.  Use Lomotil as needed for diarrhea.  If symptoms were to worsen would recommend budesonide therapy.  Symptoms however at this point improved per the patient's account therefore no further treatment recommended for microscopic colitis which is likely related to NSAID utilization.  Discontinue NSAIDs.    Biliary sludge.  Biliary colic present.  Offered patient low-fat diet versus cholecystectomy.  Risk benefits have been discussed.  Patient voiced understanding risk benefits wished to proceed with surgical cholecystectomy.  All questions been answered to patient's satisfaction.      Risks and benefits of cholecystectomy were discussed with the patient. Benefits the patient could receive would be the resolution of gallbladder attacks causing pain, cramps, nausea vomiting etc.  Benefits also include eliminating the risk of the patient presenting through the ER with acute cholecystitis and needing an emergent operation.  Risks of cholecystectomy were also included in our  discussion.  Risks include injury to the common bile duct (liver drain tube) occurring in 1 in 250 cholecystectomy is performed across the [place].  I discussed with the patient that if this were to occur she could need further surgeries to include likely a hepaticojejunostomy.  I discussed there is a possibility of transfer for surgical therapy for this, if it were to occur.  I also discussed the risk of conversion to an open procedure (cold fashion surgery, how gallbladders were taken out previously) to occur in 5% of cholecystectomies.  I discussed that in 100% cholecystectomies I started with the small incision (laparoscopic technique).  Reason for conversion to an open procedure is related to bleeding, significant scarring or inflammation, inability to obtain a critical view which could lead to injury of surrounding structures to include the stomach, duodenum, IVC, common bile duct, etc.  I discussed there is also a possibility of postoperatively needing reoperation.  Additionally, I have discussed with the patient the possibilities of biliary leak after laparoscopic cholecystectomy.  Literature would suggest a 2% leak rate.  This could necessitate a postoperative ERCP or even postoperative procedure including diagnostic laparoscopy for drainage or percutaneous drainage.  There is also the intrinsic risks of any surgery to include bleeding and infection.  Patient understands all the above risks and benefits and wishes to proceed in the near future with laparoscopic versus open cholecystectomy.  Counseling time 60 minutes in clinic.  I drew a picture for the patient of the foregut liver biliary anatomy and tree for further understanding while in clinic.    Patient instructed that best way to communicate with my office staff is for patient to get on the [facility] patient portal to expedite communication and communication issues that may occur.  Patient was given instructions on how to get on the portal.   I encouraged patient to obtain portal access as well.  Ultimately it is up to the patient to obtain access.  Patient voiced understanding.

## 2022-11-30 NOTE — TRANSFER OF CARE
Anesthesia Transfer of Care Note    Patient: Heri GARCIA Vermeal    Procedure(s) Performed: Procedure(s) (LRB):  CHOLECYSTECTOMY-LAPAROSCOPIC (Bilateral)    Patient location: PACU    Anesthesia Type: general    Transport from OR: Transported from OR on room air with adequate spontaneous ventilation    Post pain: adequate analgesia    Post assessment: no apparent anesthetic complications and tolerated procedure well    Post vital signs: stable    Level of consciousness: awake, alert and oriented    Nausea/Vomiting: no nausea/vomiting    Complications: none    Transfer of care protocol was followed      Last vitals:   Visit Vitals  /72   Pulse 80   Temp 36.7 °C (98.1 °F)   Resp 16   SpO2 95%

## 2022-11-30 NOTE — OP NOTE
[facility] - Surgery  Operative Note     SUMMARY     Surgery Date: 11/30/2022     Pre-op Diagnosis:  Gallbladder sludge [K82.8]    Post-op Diagnosis:  Post-Op Diagnosis Codes:     * Gallbladder sludge [K82.8]    Procedure(s) (LRB):  CHOLECYSTECTOMY-LAPAROSCOPIC (Bilateral)    Surgeon(s) and Role:     * Lex Morton MD - Primary    Assistant: None    Antibiotics: mefoxin 2 gram IV    Estimated Blood Loss: 10 mL    Anesthesia:  General    Description of the findings of the procedure:  Chronic cholecystitis.  Critical view of safety was achieved ensured.    Specimens:  Gallbladder.    Complications:  None apparent in the operative room.    Implants:  None.         Indications for Procedure:     Ms Jansen is a 73yo F presented clinic as referral for evaluation of biliary sludge.  Patient also with diarrhea.  Found to have collagenous colitis.  Patient with symptoms of bloating etcetera.  Diarrhea improved.  Continued symptomatology.  Consistent with biliary colic.  Patient was offered low-fat diet versus cholecystectomy.  Risk benefits of both options were discussed in detail the patient clinic.  After risk benefits discussion, patient voiced understanding risk benefits wished to proceed today with surgical cholecystectomy.  All questions were answered to patient's satisfaction.    Procedure:     PROCEDURE IN DETAIL:  The patient was brought back into the Operative Room,  placed on table in supine position.  General anesthesia was introduced via  the endotracheal tube.  A timeout was then conducted with all in the  Operating Room in agreement, correct patient, correct procedure, correct  allergies and correct antibiotics.  The patient was then given 2g mefoxin  IV.  The patient's abdomen was then prepped and draped in the standard  sterile surgical fashion.    I then made a periumbilical incision.   Incision was carried down to the fascia with Bovie  electrocautery.  Towel clips were used to elevate the  umbilical stalk.  The  fascia was entered in a vertical fashion, two 0 Vicryl sutures were used  inferior and superior to the fascial incision sites as stay sutures.   Suze trocar was placed after a finger sweep was conducted, which  confirmed no adhesions to the abdominal wall.  The abdomen was then  insufflated to 15 mmHg through the Suze trocar without evidence of  bradycardia episodes of the patient.  The patient was then placed in a head  up, left lateral position.    I then placed three additional trocars in the patient's right upper  quadrant under direct visualization.  These were 5-mm trocars.  I then  placed the assistant's port on the dome of the gallbladder and retracted it  superiorly.  The gallbladder showed signs of chronic cholecystitis.  At this point, I  then placed my retractor on the infundibulum of the gallbladder and  retracted it inferolaterally.  The cystic duct structures were then bluntly  dissected out with the [place] retractor.  Cystic duct and cystic artery  were visualized.  Cystic artery was behind the node of Calot.  The cystic  duct and cystic artery were cleaned off and a critical view of safety was  achieved with the liver seen behind both structures and in between both  structures.  Two clips were placed proximally on both structures, one clip  distally.  Both structures were transected distally.  The gallbladder was  then retracted with adequate tension to allow for the Bovie electrocautery  to remove the gallbladder from the liver bed. The gallbladder was not entered.  Bile was not spilled.  The gallbladder was then dissected off, bovied off  the liver bed, it was placed in an EndoCatch bag.  EndoCatch bag was then  removed through the Suze trocar. The gallbladder was handed off for permanent  section.  The liver was then retracted superiorly with a grasper.  The  gallbladder bed was washed out copiously with  irrigant.  Hemostasis was  achieved in the gallbladder bed.   The clips placed previously on cystic  duct and cystic artery were visualized again.  There was no hemorrhage from  the artery.  There was no bile spillage from the cystic duct.  The patient  was then placed in a back to normal position, in the supine position.  The  right upper quadrant was irrigated out copiously with  irrigant until  clear fluid returned.  Three 5-mm trocars were removed under direct  visualization.  There was no hemorrhage from the port sites.  Insufflation  was released.  A Suze trocar was then removed.    Attention was then turned towards repair of the umbilical Suze port site.   A 0 Vicryl sutures were used in a figure-of-eight fashion to close the  fascia at the Suze port in a vertical fashion.  These were tied down.   The two stay sutures previously placed were also tied down.  The fascia at  this site was not patent to a finger.  At this point, the umbilical site  was washed out with  irrigant.  3-0 Vicryl sutures were then used in a  simple subdermal fashion to close the subdermis at all sites.  The skin was  then run with a 4-0 Vicryl suture in a running subcuticular fashion at the  umbilical site.  Dermabond was placed over all four dressing.  The patient  was then reversed from general anesthesia, extubated in the OR, transferred  back to the Postoperative Care Unit in stable condition.  All counts were  correct at the end of the procedure including lap pads, instruments as well  as needles.

## 2022-11-30 NOTE — PROGRESS NOTES
Pt awake and alert , holding down PO fluids,instructed on incentive spirometer , pt able to preform prior to discharge

## 2022-12-09 LAB
FINAL PATHOLOGIC DIAGNOSIS: NORMAL
GROSS: NORMAL
Lab: NORMAL

## 2022-12-15 ENCOUNTER — OFFICE VISIT (OUTPATIENT)
Dept: SURGERY | Facility: CLINIC | Age: 72
End: 2022-12-15
Payer: MEDICARE

## 2022-12-15 VITALS
DIASTOLIC BLOOD PRESSURE: 63 MMHG | SYSTOLIC BLOOD PRESSURE: 160 MMHG | OXYGEN SATURATION: 95 % | HEART RATE: 97 BPM | WEIGHT: 90 LBS | HEIGHT: 60 IN | BODY MASS INDEX: 17.67 KG/M2

## 2022-12-15 DIAGNOSIS — Z09 POSTOP CHECK: Primary | ICD-10-CM

## 2022-12-15 PROCEDURE — 99999 PR PBB SHADOW E&M-EST. PATIENT-LVL III: CPT | Mod: PBBFAC,,, | Performed by: SURGERY

## 2022-12-15 PROCEDURE — 99213 OFFICE O/P EST LOW 20 MIN: CPT | Mod: PBBFAC | Performed by: SURGERY

## 2022-12-15 PROCEDURE — 99999 PR PBB SHADOW E&M-EST. PATIENT-LVL III: ICD-10-PCS | Mod: PBBFAC,,, | Performed by: SURGERY

## 2022-12-15 PROCEDURE — 99024 PR POST-OP FOLLOW-UP VISIT: ICD-10-PCS | Mod: POP,,, | Performed by: SURGERY

## 2022-12-15 PROCEDURE — 99024 POSTOP FOLLOW-UP VISIT: CPT | Mod: POP,,, | Performed by: SURGERY

## 2022-12-15 NOTE — LETTER
December 15, 2022      [facility] - General Surgery  [address] [place] [address]  Phone: [phone]  Fax: [phone]       Patient: Heri Jansen   YOB: 1950  Date of Visit: 12/15/2022    To Whom It May Concern:    Janki Jansen  was at [facility] on 12/15/2022. The patient is under the care of Dr Lex Morton and may return to work on Monday January 2, 2023 with no restrictions. If you have any questions or concerns, or if I can be of further assistance, please do not hesitate to contact me.    Sincerely,          Marsha Brar LPN / Lex Morton

## 2022-12-15 NOTE — PROGRESS NOTES
General Surgery  [facility]  Follow-up    HPI/Follow-up exam:  Heri Jansen is a 72 y.o. female presents today for follow-up examination.  Patient since last visit has done well.  She underwent cholecystectomy in the last month.  Symptoms prior to cholecystectomy significantly improved.  Still some soreness bilateral upper quadrants.  Mild.  No nausea vomiting.  Tolerating diet having normal bowel function.  No other new issues or complaints.    PHYSICAL EXAM:  BP (!) 160/63   Pulse 97   Ht 5' (1.524 m)   Wt 40.8 kg (90 lb)   SpO2 95%   BMI 17.58 kg/m²   Gen: Wd Wn female in NAD  Heent: Nc/At, MMM  Cv: RRR  Lung: Non-labored breathing, clear bilaterally  Abd: Soft, non-tender, non-distended, surgical sites clean dry intact no signs or symptoms of infection.  Ext: No cyanosis clubbing or edema    Pathology:  Chronic cholecystitis.    Assessment:  Heri Jansen is a 72 y.o. female s/p laparoscopic cholecystectomy.    Plan/Medical Decision Making:  No apparent postoperative issues.  Will recommend patient remain off of work for the next 2 weeks.  Return to work anticipated January 2nd from a medical standpoint.  Continue lifting precautions until that time.  Follow-up surgery clinic as needed otherwise afterwards.    Followup:  As needed    Patient instructed that best way to communicate with my office staff is for patient to get on the [facility] patient portal to expedite communication and communication issues that may occur.  Patient was given instructions on how to get on the portal.  I encouraged patient to obtain portal access as well.  Ultimately it is up to the patient to obtain access.  Patient voiced understanding.

## 2022-12-16 ENCOUNTER — TELEPHONE (OUTPATIENT)
Dept: SURGERY | Facility: CLINIC | Age: 72
End: 2022-12-16
Payer: MEDICARE

## 2022-12-16 NOTE — TELEPHONE ENCOUNTER
Writer spoke to patient and pt stated that [employer] did not receive my faxed work excuse. Refaxed letter to both [phone] and [phone]. Pt expressed verbal understanding.

## 2022-12-16 NOTE — TELEPHONE ENCOUNTER
----- Message from Ashley Hernandez, Patient Care Assistant sent at 12/16/2022 12:48 PM CST -----  Regarding: advice  Contact: pt  Type: Needs Medical Advice  Who Called:  pt   Best Call Back Number: [phone] (home)     Additional Information: pt states she would like a callback regarding her insurance not covering her medical leave. Please call pt to advise. Thanks!

## 2023-03-25 ENCOUNTER — HOSPITAL ENCOUNTER (EMERGENCY)
Facility: HOSPITAL | Age: 73
Discharge: HOME | End: 2023-03-26
Attending: EMERGENCY MEDICINE
Payer: MEDICARE

## 2023-03-25 VITALS
BODY MASS INDEX: 19.24 KG/M2 | TEMPERATURE: 98 F | HEART RATE: 107 BPM | HEIGHT: 60 IN | RESPIRATION RATE: 21 BRPM | SYSTOLIC BLOOD PRESSURE: 129 MMHG | WEIGHT: 98 LBS | DIASTOLIC BLOOD PRESSURE: 65 MMHG | OXYGEN SATURATION: 99 %

## 2023-03-25 DIAGNOSIS — J18.9 PNEUMONIA OF RIGHT MIDDLE LOBE DUE TO INFECTIOUS ORGANISM: Primary | ICD-10-CM

## 2023-03-25 LAB
ALBUMIN SERPL BCP-MCNC: 3.2 G/DL (ref 3.5–5.2)
ALP SERPL-CCNC: 92 U/L (ref 55–135)
ALT SERPL W/O P-5'-P-CCNC: 7 U/L (ref 10–44)
ANION GAP SERPL CALC-SCNC: 10 MMOL/L (ref 8–16)
AST SERPL-CCNC: 13 U/L (ref 10–40)
BASOPHILS # BLD AUTO: 0.05 K/UL (ref 0–0.2)
BASOPHILS NFR BLD: 0.4 % (ref 0–1.9)
BILIRUB SERPL-MCNC: 0.6 MG/DL (ref 0.1–1)
BILIRUB UR QL STRIP: NEGATIVE
BNP SERPL-MCNC: 112 PG/ML (ref 0–99)
BUN SERPL-MCNC: 9 MG/DL (ref 8–23)
CALCIUM SERPL-MCNC: 8.9 MG/DL (ref 8.7–10.5)
CHLORIDE SERPL-SCNC: 99 MMOL/L (ref 95–110)
CLARITY UR: CLEAR
CO2 SERPL-SCNC: 23 MMOL/L (ref 23–29)
COLOR UR: YELLOW
CREAT SERPL-MCNC: 0.8 MG/DL (ref 0.5–1.4)
DIFFERENTIAL METHOD: ABNORMAL
EOSINOPHIL # BLD AUTO: 0 K/UL (ref 0–0.5)
EOSINOPHIL NFR BLD: 0.3 % (ref 0–8)
ERYTHROCYTE [DISTWIDTH] IN BLOOD BY AUTOMATED COUNT: 13.5 % (ref 11.5–14.5)
EST. GFR  (NO RACE VARIABLE): >60 ML/MIN/1.73 M^2
GLUCOSE SERPL-MCNC: 101 MG/DL (ref 70–110)
GLUCOSE UR QL STRIP: NEGATIVE
HCT VFR BLD AUTO: 28.6 % (ref 37–48.5)
HGB BLD-MCNC: 9.9 G/DL (ref 12–16)
HGB UR QL STRIP: NEGATIVE
IMM GRANULOCYTES # BLD AUTO: 0.04 K/UL (ref 0–0.04)
IMM GRANULOCYTES NFR BLD AUTO: 0.3 % (ref 0–0.5)
INFLUENZA A, MOLECULAR: NEGATIVE
INFLUENZA B, MOLECULAR: NEGATIVE
KETONES UR QL STRIP: NEGATIVE
LACTATE SERPL-SCNC: 0.6 MMOL/L (ref 0.5–2.2)
LEUKOCYTE ESTERASE UR QL STRIP: NEGATIVE
LYMPHOCYTES # BLD AUTO: 1.7 K/UL (ref 1–4.8)
LYMPHOCYTES NFR BLD: 12.9 % (ref 18–48)
MCH RBC QN AUTO: 31.4 PG (ref 27–31)
MCHC RBC AUTO-ENTMCNC: 34.6 G/DL (ref 32–36)
MCV RBC AUTO: 91 FL (ref 82–98)
MONOCYTES # BLD AUTO: 1 K/UL (ref 0.3–1)
MONOCYTES NFR BLD: 7.7 % (ref 4–15)
NEUTROPHILS # BLD AUTO: 10 K/UL (ref 1.8–7.7)
NEUTROPHILS NFR BLD: 78.4 % (ref 38–73)
NITRITE UR QL STRIP: NEGATIVE
NRBC BLD-RTO: 0 /100 WBC
PH UR STRIP: 7 [PH] (ref 5–8)
PLATELET # BLD AUTO: 366 K/UL (ref 150–450)
PMV BLD AUTO: 9.5 FL (ref 9.2–12.9)
POTASSIUM SERPL-SCNC: 4 MMOL/L (ref 3.5–5.1)
PROT SERPL-MCNC: 7.5 G/DL (ref 6–8.4)
PROT UR QL STRIP: NEGATIVE
RBC # BLD AUTO: 3.15 M/UL (ref 4–5.4)
SARS-COV-2 RDRP RESP QL NAA+PROBE: NEGATIVE
SODIUM SERPL-SCNC: 132 MMOL/L (ref 136–145)
SP GR UR STRIP: 1.01 (ref 1–1.03)
SPECIMEN SOURCE: NORMAL
URN SPEC COLLECT METH UR: NORMAL
UROBILINOGEN UR STRIP-ACNC: NEGATIVE EU/DL
WBC # BLD AUTO: 12.77 K/UL (ref 3.9–12.7)

## 2023-03-25 PROCEDURE — 63600175 PHARM REV CODE 636 W HCPCS: Performed by: EMERGENCY MEDICINE

## 2023-03-25 PROCEDURE — 85025 COMPLETE CBC W/AUTO DIFF WBC: CPT | Performed by: EMERGENCY MEDICINE

## 2023-03-25 PROCEDURE — 25000242 PHARM REV CODE 250 ALT 637 W/ HCPCS: Performed by: EMERGENCY MEDICINE

## 2023-03-25 PROCEDURE — 81003 URINALYSIS AUTO W/O SCOPE: CPT | Performed by: EMERGENCY MEDICINE

## 2023-03-25 PROCEDURE — 87040 BLOOD CULTURE FOR BACTERIA: CPT | Mod: 59 | Performed by: EMERGENCY MEDICINE

## 2023-03-25 PROCEDURE — U0002 COVID-19 LAB TEST NON-CDC: HCPCS | Performed by: EMERGENCY MEDICINE

## 2023-03-25 PROCEDURE — 87502 INFLUENZA DNA AMP PROBE: CPT | Performed by: EMERGENCY MEDICINE

## 2023-03-25 PROCEDURE — 71045 X-RAY EXAM CHEST 1 VIEW: CPT | Mod: TC

## 2023-03-25 PROCEDURE — 71045 XR CHEST AP PORTABLE: ICD-10-PCS | Mod: 26,,, | Performed by: RADIOLOGY

## 2023-03-25 PROCEDURE — 71250 CT CHEST WITHOUT CONTRAST: ICD-10-PCS | Mod: 26,,, | Performed by: RADIOLOGY

## 2023-03-25 PROCEDURE — 83880 ASSAY OF NATRIURETIC PEPTIDE: CPT | Performed by: EMERGENCY MEDICINE

## 2023-03-25 PROCEDURE — 96361 HYDRATE IV INFUSION ADD-ON: CPT

## 2023-03-25 PROCEDURE — 83605 ASSAY OF LACTIC ACID: CPT | Performed by: EMERGENCY MEDICINE

## 2023-03-25 PROCEDURE — 71250 CT THORAX DX C-: CPT | Mod: 26,,, | Performed by: RADIOLOGY

## 2023-03-25 PROCEDURE — 71045 X-RAY EXAM CHEST 1 VIEW: CPT | Mod: 26,,, | Performed by: RADIOLOGY

## 2023-03-25 PROCEDURE — 96365 THER/PROPH/DIAG IV INF INIT: CPT

## 2023-03-25 PROCEDURE — 25000003 PHARM REV CODE 250: Performed by: EMERGENCY MEDICINE

## 2023-03-25 PROCEDURE — 80053 COMPREHEN METABOLIC PANEL: CPT | Performed by: EMERGENCY MEDICINE

## 2023-03-25 PROCEDURE — 96375 TX/PRO/DX INJ NEW DRUG ADDON: CPT

## 2023-03-25 PROCEDURE — 99285 EMERGENCY DEPT VISIT HI MDM: CPT | Mod: 25

## 2023-03-25 PROCEDURE — 36415 COLL VENOUS BLD VENIPUNCTURE: CPT | Performed by: EMERGENCY MEDICINE

## 2023-03-25 PROCEDURE — 71250 CT THORAX DX C-: CPT | Mod: TC

## 2023-03-25 RX ORDER — AZITHROMYCIN 250 MG/1
TABLET, FILM COATED ORAL
Qty: 6 TABLET | Refills: 0 | Status: SHIPPED | OUTPATIENT
Start: 2023-03-25 | End: 2023-03-30

## 2023-03-25 RX ORDER — ALBUTEROL SULFATE 90 UG/1
1-2 AEROSOL, METERED RESPIRATORY (INHALATION) EVERY 6 HOURS PRN
Qty: 8 G | Refills: 0 | Status: SHIPPED | OUTPATIENT
Start: 2023-03-25

## 2023-03-25 RX ORDER — ALBUTEROL SULFATE 90 UG/1
1-2 AEROSOL, METERED RESPIRATORY (INHALATION) EVERY 6 HOURS PRN
Qty: 8 G | Refills: 0 | Status: SHIPPED | OUTPATIENT
Start: 2023-03-25 | End: 2023-03-25 | Stop reason: SDUPTHER

## 2023-03-25 RX ORDER — AZITHROMYCIN 250 MG/1
TABLET, FILM COATED ORAL
Qty: 6 TABLET | Refills: 0 | Status: SHIPPED | OUTPATIENT
Start: 2023-03-25 | End: 2023-03-25 | Stop reason: SDUPTHER

## 2023-03-25 RX ORDER — IBUPROFEN 400 MG/1
800 TABLET ORAL
Status: COMPLETED | OUTPATIENT
Start: 2023-03-25 | End: 2023-03-25

## 2023-03-25 RX ORDER — ACETAMINOPHEN 500 MG
1000 TABLET ORAL
Status: COMPLETED | OUTPATIENT
Start: 2023-03-25 | End: 2023-03-25

## 2023-03-25 RX ORDER — AMOXICILLIN AND CLAVULANATE POTASSIUM 600; 42.9 MG/5ML; MG/5ML
600 POWDER, FOR SUSPENSION ORAL EVERY 12 HOURS
Qty: 100 ML | Refills: 0 | Status: SHIPPED | OUTPATIENT
Start: 2023-03-25 | End: 2023-04-04

## 2023-03-25 RX ORDER — DEXAMETHASONE SODIUM PHOSPHATE 10 MG/ML
10 INJECTION INTRAMUSCULAR; INTRAVENOUS
Status: COMPLETED | OUTPATIENT
Start: 2023-03-25 | End: 2023-03-25

## 2023-03-25 RX ORDER — ALBUTEROL SULFATE 0.83 MG/ML
2.5 SOLUTION RESPIRATORY (INHALATION)
Status: COMPLETED | OUTPATIENT
Start: 2023-03-25 | End: 2023-03-25

## 2023-03-25 RX ORDER — AMOXICILLIN AND CLAVULANATE POTASSIUM 600; 42.9 MG/5ML; MG/5ML
600 POWDER, FOR SUSPENSION ORAL EVERY 12 HOURS
Qty: 100 ML | Refills: 0 | Status: SHIPPED | OUTPATIENT
Start: 2023-03-25 | End: 2023-03-25 | Stop reason: SDUPTHER

## 2023-03-25 RX ADMIN — SODIUM CHLORIDE 1000 ML: 9 INJECTION, SOLUTION INTRAVENOUS at 10:03

## 2023-03-25 RX ADMIN — IBUPROFEN 800 MG: 400 TABLET ORAL at 10:03

## 2023-03-25 RX ADMIN — CEFTRIAXONE 2 G: 2 INJECTION, SOLUTION INTRAVENOUS at 10:03

## 2023-03-25 RX ADMIN — DEXAMETHASONE SODIUM PHOSPHATE 10 MG: 10 INJECTION INTRAMUSCULAR; INTRAVENOUS at 10:03

## 2023-03-25 RX ADMIN — ALBUTEROL SULFATE 2.5 MG: 2.5 SOLUTION RESPIRATORY (INHALATION) at 10:03

## 2023-03-25 RX ADMIN — ACETAMINOPHEN 1000 MG: 500 TABLET ORAL at 10:03

## 2023-03-26 NOTE — DISCHARGE INSTRUCTIONS
Tylenol 650mg and/or Motrin 600mg every 4-6hrs for fever. See your doctor next week for recheck and to get set up for outpatient CT scan of your chest with contrast to ensure resolution of the pneumonia and check for lung mass.

## 2023-03-26 NOTE — ED PROVIDER NOTES
Encounter Date: 3/25/2023       History     Chief Complaint   Patient presents with    Fever     Cough x 2 weeks. Fever intermittent. Unknown timeline.     Pt here with cough and congestion the past week now with fever and chills. Family members with flu. Pt denies bodyaches and HA. She otherwise feels well.     The history is provided by the patient.   Fever  Primary symptoms of the febrile illness include fever and cough. Primary symptoms do not include fatigue, visual change, headaches, wheezing, shortness of breath, abdominal pain, nausea, vomiting, diarrhea, dysuria, altered mental status, myalgias, arthralgias or rash. The current episode started more than 1 week ago. This is a new problem. The problem has not changed since onset.  The cough began more than 1 week ago. The cough is non-productive.   Review of patient's allergies indicates:   Allergen Reactions    Levaquin [levofloxacin] Hives     Past Medical History:   Diagnosis Date    Anxiety     GERD (gastroesophageal reflux disease)      Past Surgical History:   Procedure Laterality Date    COLONOSCOPY N/A 11/7/2022    Procedure: COLONOSCOPY;  Surgeon: Lex Morton MD;  Location: [facility] ENDO;  Service: General;  Laterality: N/A;    FRACTURE SURGERY      HEMIARTHROPLASTY OF HIP Right 11/13/2021    Procedure: HEMIARTHROPLASTY, HIP;  Surgeon: Dontae Lopez MD;  Location: [facility] OR;  Service: Orthopedics;  Laterality: Right;    LAPAROSCOPIC CHOLECYSTECTOMY Bilateral 11/30/2022    Procedure: CHOLECYSTECTOMY-LAPAROSCOPIC;  Surgeon: Lex Morton MD;  Location: [facility] OR;  Service: General;  Laterality: Bilateral;     Family History   Problem Relation Age of Onset    Heart disease Mother      Social History     Tobacco Use    Smoking status: Never    Smokeless tobacco: Never   Substance Use Topics    Alcohol use: Yes     Comment: a bottle of wine or 6 glasses of beer daily    Drug use: No     Review of Systems   Constitutional:  Positive for  chills and fever. Negative for fatigue.   HENT:  Positive for congestion and postnasal drip.    Respiratory:  Positive for cough. Negative for shortness of breath and wheezing.    Cardiovascular:  Negative for chest pain, palpitations and leg swelling.   Gastrointestinal:  Negative for abdominal pain, diarrhea, nausea and vomiting.   Genitourinary:  Negative for dysuria.   Musculoskeletal:  Negative for arthralgias and myalgias.   Skin:  Negative for rash.   Neurological:  Negative for headaches.   All other systems reviewed and are negative.    Physical Exam     Initial Vitals [03/25/23 1919]   BP Pulse Resp Temp SpO2   136/68 (!) 122 20 (!) 100.7 °F (38.2 °C) 97 %      MAP       --         Physical Exam    Nursing note and vitals reviewed.  Constitutional: She appears well-developed and well-nourished. She is not diaphoretic. No distress.   HENT:   Mouth/Throat: Oropharynx is clear and moist.   Eyes: Conjunctivae and EOM are normal. No scleral icterus.   Neck: Neck supple.   Normal range of motion.  Cardiovascular:  Regular rhythm, normal heart sounds and intact distal pulses.           tachy   Pulmonary/Chest: No respiratory distress.   Mild basilar rales with good air movement   Abdominal: Abdomen is soft. She exhibits no distension. There is no abdominal tenderness.   Musculoskeletal:         General: No tenderness or edema. Normal range of motion.      Cervical back: Normal range of motion and neck supple.     Lymphadenopathy:     She has no cervical adenopathy.   Neurological: She is alert and oriented to person, place, and time. She has normal strength. No cranial nerve deficit or sensory deficit. GCS score is 15. GCS eye subscore is 4. GCS verbal subscore is 5. GCS motor subscore is 6.   Skin: Skin is warm and dry. Capillary refill takes less than 2 seconds. No rash noted. No erythema. No pallor.       ED Course   Procedures  Labs Reviewed   CBC W/ AUTO DIFFERENTIAL - Abnormal; Notable for the following  components:       Result Value    WBC 12.77 (*)     RBC 3.15 (*)     Hemoglobin 9.9 (*)     Hematocrit 28.6 (*)     MCH 31.4 (*)     Gran # (ANC) 10.0 (*)     Gran % 78.4 (*)     Lymph % 12.9 (*)     All other components within normal limits   COMPREHENSIVE METABOLIC PANEL - Abnormal; Notable for the following components:    Sodium 132 (*)     Albumin 3.2 (*)     ALT 7 (*)     All other components within normal limits   B-TYPE NATRIURETIC PEPTIDE - Abnormal; Notable for the following components:     (*)     All other components within normal limits   INFLUENZA A & B BY MOLECULAR   CULTURE, BLOOD   CULTURE, BLOOD   URINALYSIS, REFLEX TO URINE CULTURE    Narrative:     Preferred Collection Type->Urine, Clean Catch  Specimen Source->Urine   SARS-COV-2 RNA AMPLIFICATION, QUAL    Narrative:     Is the patient symptomatic?->No   LACTIC ACID, PLASMA   B-TYPE NATRIURETIC PEPTIDE          Imaging Results              CT Chest Without Contrast (In process)                      X-Ray Chest AP Portable (In process)                      Medications   cefTRIAXone (ROCEPHIN) 2 g/50 mL D5W IVPB (2 g Intravenous New Bag 3/25/23 2256)   sodium chloride 0.9% bolus 1,000 mL 1,000 mL (1,000 mLs Intravenous New Bag 3/25/23 2223)   acetaminophen tablet 1,000 mg (1,000 mg Oral Given 3/25/23 2221)   ibuprofen tablet 800 mg (800 mg Oral Given 3/25/23 2221)   albuterol nebulizer solution 2.5 mg (2.5 mg Nebulization Given 3/25/23 2220)   dexAMETHasone sodium phos (PF) injection 10 mg (10 mg Intravenous Given 3/25/23 2223)     Medical Decision Making:   Differential Diagnosis:   Pneumonia, covid, flu, bronchitis  Clinical Tests:   Lab Tests: Ordered and Reviewed  Radiological Study: Ordered and Reviewed  ED Management:  Pt presented with URI symptoms with cough and fever. Benign exam. Well patient. CBC with mild leukocytosis. Unremarkable CXR. CT chest performed and showed RML pneumonia vs mass. CT chest with contrast rec'd. Pt given  Rocephin. Covid, and flu neg. Lactate normal. UA neg. Will treat pt with abx and have her f/u with her PCP in the next week for recheck and outpatient CT. Pt agrees with plan.            ED Course as of 03/25/23 2325   Sat Mar 25, 2023   2146 CXR nap my read. [DC]   2225 CT shows RML pneumonia vs mass. Rads read pending. [DC]   2319 CT chest: IMPRESSION:  1. Masslike areas of consolidation within the right middle lobe, possible pneumonia. Neoplasm not  excluded particularly the lower area of consolidation which does not have air bronchograms. Followup indicated. Advise post IV contrast CT chest.  2. Single abnormally enlarged precarinal lymph node.  3. Severe osteopenia and with numerous chronic fractures. [DC]      ED Course User Index  [DC] Demetris Evans Jr., MD                 Clinical Impression:   Final diagnoses:  [J18.9] Pneumonia of right middle lobe due to infectious organism (Primary)        ED Disposition Condition    Discharge Stable          ED Prescriptions       Medication Sig Dispense Start Date End Date Auth. Provider    amoxicillin-clavulanate (AUGMENTIN) 600-42.9 mg/5 mL SusR Take 5 mLs (600 mg total) by mouth every 12 (twelve) hours. for 10 days 100 mL 3/25/2023 4/4/2023 Demetris Evans Jr., MD    azithromycin (Z-MILAGRO) 250 MG tablet Take 2 tablets by mouth on day 1; Take 1 tablet by mouth on days 2-5 6 tablet 3/25/2023 3/30/2023 Demetris Evans Jr., MD    albuterol (PROVENTIL/VENTOLIN HFA) 90 mcg/actuation inhaler Inhale 1-2 puffs into the lungs every 6 (six) hours as needed for Wheezing or Shortness of Breath. Rescue 8 g 3/25/2023 -- Demetris Evans Jr., MD          Follow-up Information       Follow up With Specialties Details Why Contact Info    Joaquin Manrique MD Otolaryngology In 1 week  [address] [facility] [place] [address] [phone]               Demetris Evans Jr., MD  03/25/23 2325

## 2023-03-31 LAB
BACTERIA BLD CULT: NORMAL
BACTERIA BLD CULT: NORMAL

## 2023-08-04 ENCOUNTER — OFFICE VISIT (OUTPATIENT)
Dept: FAMILY MEDICINE | Facility: CLINIC | Age: 73
End: 2023-08-04
Payer: MEDICARE

## 2023-08-04 VITALS
WEIGHT: 93.5 LBS | HEART RATE: 83 BPM | BODY MASS INDEX: 18.36 KG/M2 | SYSTOLIC BLOOD PRESSURE: 124 MMHG | HEIGHT: 60 IN | OXYGEN SATURATION: 97 % | DIASTOLIC BLOOD PRESSURE: 62 MMHG

## 2023-08-04 DIAGNOSIS — R05.2 SUBACUTE COUGH: Primary | ICD-10-CM

## 2023-08-04 DIAGNOSIS — R09.81 STUFFY NOSE: ICD-10-CM

## 2023-08-04 DIAGNOSIS — R09.81 NASAL CONGESTION: ICD-10-CM

## 2023-08-04 PROCEDURE — 99999 PR PBB SHADOW E&M-EST. PATIENT-LVL III: CPT | Mod: PBBFAC,,, | Performed by: FAMILY MEDICINE

## 2023-08-04 PROCEDURE — 99204 PR OFFICE/OUTPT VISIT, NEW, LEVL IV, 45-59 MIN: ICD-10-PCS | Mod: S$PBB,,, | Performed by: FAMILY MEDICINE

## 2023-08-04 PROCEDURE — 99204 OFFICE O/P NEW MOD 45 MIN: CPT | Mod: S$PBB,,, | Performed by: FAMILY MEDICINE

## 2023-08-04 PROCEDURE — 99999 PR PBB SHADOW E&M-EST. PATIENT-LVL III: ICD-10-PCS | Mod: PBBFAC,,, | Performed by: FAMILY MEDICINE

## 2023-08-04 PROCEDURE — 99213 OFFICE O/P EST LOW 20 MIN: CPT | Mod: PBBFAC,PN | Performed by: FAMILY MEDICINE

## 2023-08-04 RX ORDER — BENZONATATE 200 MG/1
200 CAPSULE ORAL 3 TIMES DAILY PRN
Qty: 30 CAPSULE | Refills: 0 | Status: SHIPPED | OUTPATIENT
Start: 2023-08-04 | End: 2023-08-14

## 2023-08-04 RX ORDER — METHYLPREDNISOLONE 4 MG/1
TABLET ORAL
Qty: 21 EACH | Refills: 0 | Status: SHIPPED | OUTPATIENT
Start: 2023-08-04 | End: 2023-08-21

## 2023-08-04 RX ORDER — MOMETASONE FUROATE 50 UG/1
2 SPRAY, METERED NASAL DAILY
Qty: 17 G | Refills: 1 | Status: SHIPPED | OUTPATIENT
Start: 2023-08-04

## 2023-08-04 NOTE — PROGRESS NOTES
Subjective:       Patient ID: Heri Jansen is a 73 y.o. female.    Chief Complaint: Establish Care (Sinus problems. Got nasal sprays from another doctor and she feels she is getting immune to it. Her nose drains a little but it is very dry and when it drains it drains into her chest. Woke up a few days ago to swollen left arm. Went down but still hurts. )    Ms. Jansen presents today with some sinus complaints. She has chronically had some problems with sinus congestion. Feels like her flonase and astelin are not chronically working. Her nose stays really dry and she sometimes get a scratchy throat and has had a little dry raspy cough. She is also newly having some swelling in the left wrist and hand. She idd have a break in the skin and thinks that something may have bit her. They do have a lot of bees on her daughters porch. It will sometimes itch and some times it won't. She thought that it may be arthritis.       Review of Systems   Constitutional:  Negative for activity change, appetite change, fatigue and fever.   Respiratory:  Negative for shortness of breath.    Gastrointestinal:  Negative for abdominal pain.   Integumentary:  Negative for rash.         Objective:      Physical Exam  Vitals and nursing note reviewed.   Constitutional:       General: She is not in acute distress.     Appearance: She is not ill-appearing.   Cardiovascular:      Rate and Rhythm: Normal rate and regular rhythm.      Heart sounds: No murmur heard.  Pulmonary:      Effort: Pulmonary effort is normal.      Breath sounds: Normal breath sounds. No wheezing.   Skin:     General: Skin is warm and dry.      Findings: No rash.   Neurological:      Mental Status: She is alert.   Psychiatric:         Mood and Affect: Mood normal.         Behavior: Behavior normal.         Assessment:       1. Subacute cough    2. Stuffy nose        Plan:       Problem List Items Addressed This Visit    None  Visit Diagnoses       Subacute cough    -   Primary    Relevant Medications    benzonatate (TESSALON) 200 MG capsule    methylPREDNISolone (MEDROL DOSEPACK) 4 mg tablet    Other Relevant Orders    POCT COVID-19 Rapid Screening    Stuffy nose        Relevant Orders    POCT COVID-19 Rapid Screening

## 2023-08-16 DIAGNOSIS — Z11.59 NEED FOR HEPATITIS C SCREENING TEST: ICD-10-CM

## 2023-08-18 ENCOUNTER — TELEPHONE (OUTPATIENT)
Dept: FAMILY MEDICINE | Facility: CLINIC | Age: 73
End: 2023-08-18
Payer: MEDICARE

## 2023-08-18 NOTE — TELEPHONE ENCOUNTER
----- Message from Sunshine Simpson sent at 8/18/2023 10:48 AM CDT -----  Type:  Same Day Appointment Request    Caller is requesting a same day appointment.  Caller declined first available appointment listed below.      Name of Caller:pts son in law tracy    When is the first available appointment?na    Symptoms:chronic cough and congestion    Best Call Back Number:[phone]      Additional Information:       Please call Back to advise. Thanks!

## 2023-08-21 ENCOUNTER — OFFICE VISIT (OUTPATIENT)
Dept: FAMILY MEDICINE | Facility: CLINIC | Age: 73
End: 2023-08-21
Payer: MEDICARE

## 2023-08-21 VITALS
RESPIRATION RATE: 16 BRPM | SYSTOLIC BLOOD PRESSURE: 110 MMHG | OXYGEN SATURATION: 97 % | BODY MASS INDEX: 18.43 KG/M2 | DIASTOLIC BLOOD PRESSURE: 64 MMHG | WEIGHT: 93.88 LBS | HEIGHT: 60 IN | HEART RATE: 85 BPM

## 2023-08-21 DIAGNOSIS — J01.00 SUBACUTE MAXILLARY SINUSITIS: ICD-10-CM

## 2023-08-21 DIAGNOSIS — K12.1 MOUTH ULCERS: Primary | ICD-10-CM

## 2023-08-21 PROCEDURE — 99214 OFFICE O/P EST MOD 30 MIN: CPT | Mod: S$PBB,,, | Performed by: STUDENT IN AN ORGANIZED HEALTH CARE EDUCATION/TRAINING PROGRAM

## 2023-08-21 PROCEDURE — 99214 PR OFFICE/OUTPT VISIT, EST, LEVL IV, 30-39 MIN: ICD-10-PCS | Mod: S$PBB,,, | Performed by: STUDENT IN AN ORGANIZED HEALTH CARE EDUCATION/TRAINING PROGRAM

## 2023-08-21 PROCEDURE — 99999 PR PBB SHADOW E&M-EST. PATIENT-LVL III: CPT | Mod: PBBFAC,,, | Performed by: STUDENT IN AN ORGANIZED HEALTH CARE EDUCATION/TRAINING PROGRAM

## 2023-08-21 PROCEDURE — 99999 PR PBB SHADOW E&M-EST. PATIENT-LVL III: ICD-10-PCS | Mod: PBBFAC,,, | Performed by: STUDENT IN AN ORGANIZED HEALTH CARE EDUCATION/TRAINING PROGRAM

## 2023-08-21 PROCEDURE — 99213 OFFICE O/P EST LOW 20 MIN: CPT | Mod: PBBFAC,PN | Performed by: STUDENT IN AN ORGANIZED HEALTH CARE EDUCATION/TRAINING PROGRAM

## 2023-08-21 RX ORDER — AMOXICILLIN AND CLAVULANATE POTASSIUM 875; 125 MG/1; MG/1
1 TABLET, FILM COATED ORAL 2 TIMES DAILY
Qty: 14 TABLET | Refills: 0 | Status: SHIPPED | OUTPATIENT
Start: 2023-08-21 | End: 2023-08-28

## 2023-08-21 NOTE — PROGRESS NOTES
Subjective:       Patient ID: Heri Jansen is a 73 y.o. female.    Chief Complaint: Sinusitis (X 3 - 4 weeks - stuffy nose, dry cough - also c/o mouth sores x a few days )    Chronic cough  On astelin and flonase and reports decreased efficacy  She was recently given tessalon and a steroid   This helped the congestion in her chest but did not help the post nasal drip or the congestion in her nose.  She is using the astelin without much help.  No rhinorrhea  She now has mouth ulcers.  She has had this before.  No fever currently but had a fever over the last week and the weekend  No ear pain but has facial fullness/pain  She has seen ENT in the past but not recently.       Review of Systems   Constitutional:  Negative for activity change, appetite change, chills and fever.   HENT:  Positive for nasal congestion and postnasal drip. Negative for ear pain.    Respiratory:  Positive for cough. Negative for shortness of breath and wheezing.    Cardiovascular:  Negative for chest pain.   Gastrointestinal:  Negative for abdominal pain.   Genitourinary:  Negative for dysuria.   Integumentary:  Negative for rash.   Psychiatric/Behavioral:  Negative for dysphoric mood and sleep disturbance. The patient is not nervous/anxious.          Objective:      Physical Exam  Constitutional:       General: She is not in acute distress.     Appearance: Normal appearance. She is not ill-appearing.   HENT:      Right Ear: Tympanic membrane, ear canal and external ear normal. There is no impacted cerumen.      Left Ear: Tympanic membrane, ear canal and external ear normal. There is no impacted cerumen.      Mouth/Throat:      Pharynx: No oropharyngeal exudate or posterior oropharyngeal erythema.   Eyes:      Conjunctiva/sclera: Conjunctivae normal.   Cardiovascular:      Rate and Rhythm: Normal rate and regular rhythm.      Heart sounds: No murmur heard.     Gallop present.   Pulmonary:      Effort: Pulmonary effort is normal. No  respiratory distress.      Breath sounds: Normal breath sounds. No wheezing or rales.   Musculoskeletal:      Right lower leg: No edema.      Left lower leg: No edema.   Skin:     General: Skin is warm and dry.   Neurological:      Mental Status: She is alert. Mental status is at baseline.      Gait: Gait normal.   Psychiatric:         Mood and Affect: Mood normal.         Behavior: Behavior normal.         Thought Content: Thought content normal.         Judgment: Judgment normal.         Assessment:       1. Mouth ulcers    2. Subacute maxillary sinusitis        Plan:       Problem List Items Addressed This Visit    None  Visit Diagnoses       Mouth ulcers    -  Primary    tiral of magic mouthwash if it can be made    Relevant Medications    diphenhydrAMINE-aluminum-magnesium hydroxide-simethicone-LIDOcaine HCl 2%    Subacute maxillary sinusitis        treat as sinusitis developing and followup with ent    Relevant Medications    amoxicillin-clavulanate 875-125mg (AUGMENTIN) 875-125 mg per tablet

## 2023-09-21 DIAGNOSIS — Z78.0 MENOPAUSE: ICD-10-CM

## 2023-09-29 ENCOUNTER — HOSPITAL ENCOUNTER (OUTPATIENT)
Dept: RADIOLOGY | Facility: HOSPITAL | Age: 73
Discharge: HOME | End: 2023-09-29
Attending: OTOLARYNGOLOGY
Payer: MEDICARE

## 2023-09-29 DIAGNOSIS — R91.8 LUNG MASS: ICD-10-CM

## 2023-09-29 PROCEDURE — 71260 CT THORAX DX C+: CPT | Mod: TC

## 2023-09-29 PROCEDURE — 25500020 PHARM REV CODE 255: Performed by: OTOLARYNGOLOGY

## 2023-09-29 PROCEDURE — 71260 CT CHEST WITH CONTRAST: ICD-10-PCS | Mod: 26,,, | Performed by: RADIOLOGY

## 2023-09-29 PROCEDURE — 71260 CT THORAX DX C+: CPT | Mod: 26,,, | Performed by: RADIOLOGY

## 2023-09-29 RX ADMIN — IOHEXOL 75 ML: 350 INJECTION, SOLUTION INTRAVENOUS at 02:09

## 2023-10-23 NOTE — TELEPHONE ENCOUNTER
----- Message from Sarah Velasquez sent at 10/23/2023  3:10 PM CDT -----  Type:  RX Refill Request    Who Called:  pt   Refill or New Rx:  refill   RX Name and Strength:  benzonatate 200 mg  Preferred Pharmacy with phone number:    [facility]  Address: [place] [address]  Phone: [phone]  Local or Mail Order:  local   Ordering Provider:  margarita Grant Call Back Number:  [phone] or [phone]    Additional Information:  pt stated she would like to be advised on getting refill for rx or change ue to pt still having ongoing symptoms ir changing dosage ONLY if rx pill size does not get affected by dosage change please call back to advise asap thanks!

## 2023-10-23 NOTE — TELEPHONE ENCOUNTER
Pt states benzonatate 200 mg, cough is clearing up yet fully gone away. Pt requesting additional RX to clear cough.

## 2023-10-24 RX ORDER — BENZONATATE 200 MG/1
200 CAPSULE ORAL 3 TIMES DAILY PRN
Qty: 30 CAPSULE | Refills: 0 | Status: SHIPPED | OUTPATIENT
Start: 2023-10-24 | End: 2023-11-03

## 2024-03-20 ENCOUNTER — PATIENT MESSAGE (OUTPATIENT)
Dept: ADMINISTRATIVE | Facility: HOSPITAL | Age: 74
End: 2024-03-20
Payer: MEDICARE

## 2024-10-18 ENCOUNTER — HOSPITAL ENCOUNTER (EMERGENCY)
Facility: HOSPITAL | Age: 74
Discharge: HOME | End: 2024-10-18
Attending: EMERGENCY MEDICINE
Payer: MEDICARE

## 2024-10-18 VITALS
HEIGHT: 60 IN | WEIGHT: 93.69 LBS | RESPIRATION RATE: 18 BRPM | SYSTOLIC BLOOD PRESSURE: 165 MMHG | OXYGEN SATURATION: 97 % | TEMPERATURE: 98 F | BODY MASS INDEX: 18.4 KG/M2 | DIASTOLIC BLOOD PRESSURE: 79 MMHG | HEART RATE: 86 BPM

## 2024-10-18 DIAGNOSIS — K59.00 CONSTIPATION: ICD-10-CM

## 2024-10-18 PROCEDURE — 25000003 PHARM REV CODE 250: Performed by: NURSE PRACTITIONER

## 2024-10-18 PROCEDURE — 74018 RADEX ABDOMEN 1 VIEW: CPT | Mod: TC

## 2024-10-18 PROCEDURE — 99283 EMERGENCY DEPT VISIT LOW MDM: CPT | Mod: 25

## 2024-10-18 PROCEDURE — 74018 RADEX ABDOMEN 1 VIEW: CPT | Mod: 26,,, | Performed by: RADIOLOGY

## 2024-10-18 RX ORDER — BISACODYL 5 MG
5 TABLET, DELAYED RELEASE (ENTERIC COATED) ORAL DAILY PRN
Qty: 30 TABLET | Refills: 0 | Status: SHIPPED | OUTPATIENT
Start: 2024-10-18 | End: 2024-10-18

## 2024-10-18 RX ORDER — SYRING-NEEDL,DISP,INSUL,0.3 ML 29 G X1/2"
296 SYRINGE, EMPTY DISPOSABLE MISCELLANEOUS
Status: COMPLETED | OUTPATIENT
Start: 2024-10-18 | End: 2024-10-18

## 2024-10-18 RX ORDER — LANOLIN ALCOHOL/MO/W.PET/CERES
1 CREAM (GRAM) TOPICAL
COMMUNITY

## 2024-10-18 RX ORDER — BISACODYL 5 MG
5 TABLET, DELAYED RELEASE (ENTERIC COATED) ORAL DAILY PRN
Qty: 30 TABLET | Refills: 0 | Status: SHIPPED | OUTPATIENT
Start: 2024-10-18

## 2024-10-18 RX ADMIN — MAGNESIUM CITRATE 296 ML: 1.75 LIQUID ORAL at 05:10

## 2024-10-18 NOTE — ED PROVIDER NOTES
CHIEF COMPLAINT  Chief Complaint   Patient presents with    Constipation     Pt c/o constipation x 1 wk; last normal BM 1 wk ago, is only passing small pieces of hard stool since then. Pt usually has BM every 2 days.       HISTORY OF PRESENT ILLNESS  Heri Jansen is a 74 y.o. female with PMH as below who presents to ER for evaluation of constipation.  States she did not have regular bowel movement for the past week, she had a pebble like hard stools 2 days ago.  She had her last meal yesterday with a feeling of bloating.  She is ambulatory, not in distress, denies nausea or vomiting.  No other specific aggravating or relieving factors otherwise.      PAST MEDICAL HISTORY  Past Medical History:   Diagnosis Date    Anemia, unspecified 2024    Anxiety     GERD (gastroesophageal reflux disease)        CURRENT MEDICATIONS      Current Facility-Administered Medications:     ceFOXItin (MEFOXIN) 2 g in dextrose 5 % 50 mL IVPB, 2 g, Intravenous, On Call Procedure, Lex Morton MD    Current Outpatient Medications:     albuterol (PROVENTIL/VENTOLIN HFA) 90 mcg/actuation inhaler, Inhale 1-2 puffs into the lungs every 6 (six) hours as needed for Wheezing or Shortness of Breath. Rescue (Patient not taking: Reported on 2023), Disp: 8 g, Rfl: 0    alprazolam (XANAX XR) 1 MG Tb24, Take 0.5 mg by mouth once daily., Disp: , Rfl:     ALPRAZolam (XANAX) 0.5 MG tablet, SMARTSI Tablet(s) By Mouth Every 12 Hours PRN, Disp: , Rfl:     azelastine (ASTELIN) 137 mcg (0.1 %) nasal spray, TAKE 2 PUFFS EACH NOSTRIL TWICE A DAY, Disp: , Rfl:     bisacodyL (DULCOLAX) 5 mg EC tablet, Take 1 tablet (5 mg total) by mouth daily as needed for Constipation., Disp: 30 tablet, Rfl: 0    cholestyramine (QUESTRAN) 4 gram packet,  = 1 packet, Oral, BID, # 60 EA, 0 Refill(s), Maintenance, Pharmacy: [facility], GERD (gastroesophageal reflux disease)  Diarrhea  Stricture esophagus, 158, cm, 22 9:22:00 CDT, Height/Length  Measured, 44.8, kg, 07/01/22 9:22:00 CDT, Weight Do..., Disp: , Rfl:     cyproheptadine (PERIACTIN) 4 mg tablet, Take 0.125 mg by mouth 4 (four) times daily before meals and nightly. DISSOLVE 1 TABLET ORALLY EVERY 4 HOURS BEFORE MEALS, Disp: , Rfl:     diphenhydrAMINE-aluminum-magnesium hydroxide-simethicone-LIDOcaine HCl 2%, Swish and spit 10 mLs every 6 (six) hours as needed (mouth pain)., Disp: 100 each, Rfl: 0    famotidine (PEPCID) 40 MG tablet, 40 mg., Disp: , Rfl:     ferrous sulfate (FEOSOL) Tab tablet, Take 1 tablet by mouth daily with breakfast., Disp: , Rfl:     HYDROcodone-acetaminophen (NORCO) 5-325 mg per tablet, Take 1 tablet by mouth every 6 (six) hours as needed for Pain., Disp: 28 tablet, Rfl: 0    hyoscyamine (LEVSIN/SL) 0.125 mg Subl, Place 0.125 mg under the tongue every 4 (four) hours as needed. Take sublingual before meals, Disp: , Rfl:     lidocaine HCl 2% (XYLOCAINE) 2 % Soln, Take 5 mLs by mouth every 4 (four) hours., Disp: 100 mL, Rfl: 0    mometasone (NASONEX) 50 mcg/actuation nasal spray, 2 sprays by Nasal route once daily., Disp: 17 g, Rfl: 1    omeprazole (PRILOSEC) 40 MG capsule, Take 40 mg by mouth 2 (two) times daily., Disp: , Rfl:     traMADoL (ULTRAM) 50 mg tablet, Take 50 mg by mouth 3 (three) times daily as needed., Disp: , Rfl:     ALLERGIES    Review of patient's allergies indicates:   Allergen Reactions    Levaquin [levofloxacin] Hives       SURGICAL HISTORY    Past Surgical History:   Procedure Laterality Date    CHOLECYSTECTOMY      COLONOSCOPY N/A 11/07/2022    Procedure: COLONOSCOPY;  Surgeon: Lex Morton MD;  Location: [facility];  Service: General;  Laterality: N/A;    FRACTURE SURGERY      HEMIARTHROPLASTY OF HIP Right 11/13/2021    Procedure: HEMIARTHROPLASTY, HIP;  Surgeon: Dontae Lopez MD;  Location: [facility] OR;  Service: Orthopedics;  Laterality: Right;    LAPAROSCOPIC CHOLECYSTECTOMY Bilateral 11/30/2022    Procedure: CHOLECYSTECTOMY-LAPAROSCOPIC;   Surgeon: Lex Morton MD;  Location: [facility] OR;  Service: General;  Laterality: Bilateral;       SOCIAL HISTORY    Social History     Socioeconomic History    Marital status:    Tobacco Use    Smoking status: Never    Smokeless tobacco: Never   Substance and Sexual Activity    Alcohol use: Yes     Comment: a bottle of wine or 6 glasses of beer daily    Drug use: No    Sexual activity: Not Currently       FAMILY HISTORY    Family History   Problem Relation Name Age of Onset    Heart disease Mother         REVIEW OF SYSTEMS    Review of Systems   Gastrointestinal:  Positive for constipation.     All other systems reviewed and are negative    VITAL SIGNS:   BP (!) 165/79 (BP Location: Left arm, Patient Position: Lying)   Pulse 86   Temp 97.7 °F (36.5 °C) (Oral)   Resp 18   Ht 5' (1.524 m)   Wt 42.5 kg (93 lb 11.1 oz)   SpO2 97%   Breastfeeding No   BMI 18.30 kg/m²      Physical Exam  Constitutional:       Appearance: Normal appearance.   HENT:      Head: Normocephalic.   Cardiovascular:      Rate and Rhythm: Normal rate.   Pulmonary:      Effort: Pulmonary effort is normal. No respiratory distress.      Breath sounds: Normal breath sounds.   Abdominal:      Palpations: Abdomen is soft.      Tenderness: There is generalized abdominal tenderness.   Musculoskeletal:         General: Normal range of motion.   Skin:     General: Skin is warm.      Capillary Refill: Capillary refill takes less than 2 seconds.   Neurological:      General: No focal deficit present.      Mental Status: She is alert.      GCS: GCS eye subscore is 4. GCS verbal subscore is 5. GCS motor subscore is 6.   Psychiatric:         Attention and Perception: Attention normal.         Mood and Affect: Mood normal.         Speech: Speech normal.       Vitals and nursing note reviewed.     LABS    Labs Reviewed - No data to display      EKG          RADIOLOGY    X-Ray Abdomen AP 1 View (KUB)   Final Result      No acute process.          Electronically signed by: Castro Malhotra MD   Date:    10/18/2024   Time:    16:50            PROCEDURES    Procedures    Medications   magnesium citrate solution 296 mL (296 mLs Oral Given 10/18/24 1708)                Medical Decision Making  Heri Jansen is a 74 y.o. female with PMH as below who presents to ER for evaluation of constipation.  States she did not have regular bowel movement for the past week, she had a pebble like hard stools 2 days ago.  She had her last meal yesterday with a feeling of bloating.  She is ambulatory, not in distress, denies nausea or vomiting.  No other specific aggravating or relieving factors otherwise. She states her PCP put her on iron supplement every other day with stool softener. Denies taking pain medication.     Ddx: functional constipation, bowel obstruction or perforation    Xray abdomen: diffused stools, no obstruction  Mag citrate given, she wants to drink it at home.  After taking into careful account the historical factors and physical exam findings of the patient's presentation today, in conjunction with imaging studies, no acute emergent medical condition requiring admission has been identified.   Patient was discharged to home with supportive care         Problems Addressed:  Constipation: acute illness or injury    Amount and/or Complexity of Data Reviewed  Radiology: ordered. Decision-making details documented in ED Course.    Risk  OTC drugs.  Prescription drug management.           Discharge Medication List as of 10/18/2024  5:14 PM          Discharge Medication List as of 10/18/2024  5:14 PM        START taking these medications    Details   bisacodyL (DULCOLAX) 5 mg EC tablet Take 1 tablet (5 mg total) by mouth daily as needed for Constipation., Starting Fri 10/18/2024, Normal             I discussed with patient and/or family/caretaker that evaluation in the ED does not suggest any emergent or life threatening medical condition requiring immediate  intervention beyond what was provided in the ED, and I believe the patient is safe for discharge.  Regardless, an unremarkable evaluation in the ED does not preclude the development or presence of a serious or life threatening condition. As such, patient was instructed to return immediately for any worsening or change in current symptoms  Patient agrees with plan of care.    DISPOSITION  Patient discharged to home in stable condition.        FINAL IMPRESSION    1. Constipation         Adriana Curiel NP  10/18/24 3836

## 2024-10-18 NOTE — ED NOTES
Pt reports she was diagnosed with anemia a couple months ago by Dr. White, and placed on Vitamin D2 (once a week), iron pills (every other day), and a stool softener (every other day).

## 2024-10-18 NOTE — DISCHARGE INSTRUCTIONS
Take dulcolax daily until you have large bowel movement. Return for any worsening or new symptoms. Follow up with Primary Care Provider in the next 2-3 days.

## 2024-10-18 NOTE — Clinical Note
"Heri Valenzuelasa" Justyna was seen and treated in our emergency department on 10/18/2024.  She may return to work on 10/21/2024.       If you have any questions or concerns, please don't hesitate to call.      Adriana Curiel, NP"

## 2024-10-19 ENCOUNTER — HOSPITAL ENCOUNTER (EMERGENCY)
Facility: HOSPITAL | Age: 74
Discharge: HOME | End: 2024-10-19
Attending: EMERGENCY MEDICINE
Payer: MEDICARE

## 2024-10-19 VITALS
HEIGHT: 60 IN | HEART RATE: 101 BPM | DIASTOLIC BLOOD PRESSURE: 77 MMHG | RESPIRATION RATE: 18 BRPM | WEIGHT: 98 LBS | TEMPERATURE: 98 F | OXYGEN SATURATION: 98 % | SYSTOLIC BLOOD PRESSURE: 161 MMHG | BODY MASS INDEX: 19.24 KG/M2

## 2024-10-19 DIAGNOSIS — K59.00 CONSTIPATION, UNSPECIFIED CONSTIPATION TYPE: Primary | ICD-10-CM

## 2024-10-19 PROCEDURE — 99284 EMERGENCY DEPT VISIT MOD MDM: CPT

## 2024-10-19 RX ORDER — LACTULOSE 10 G/15ML
20 SOLUTION ORAL 2 TIMES DAILY PRN
Qty: 300 ML | Refills: 0 | Status: SHIPPED | OUTPATIENT
Start: 2024-10-19 | End: 2024-10-19

## 2024-10-19 RX ORDER — LACTULOSE 10 G/15ML
20 SOLUTION ORAL 2 TIMES DAILY PRN
Qty: 300 ML | Refills: 0 | Status: SHIPPED | OUTPATIENT
Start: 2024-10-19 | End: 2024-10-26

## 2024-10-19 NOTE — Clinical Note
"Heri Marquez" Justyna was seen and treated in our emergency department on 10/19/2024.  She may return to work on 10/21/2024.  Patient may return to work once cleared of constipation.     If you have any questions or concerns, please don't hesitate to call.       RN    "

## 2024-10-20 ENCOUNTER — HOSPITAL ENCOUNTER (INPATIENT)
Facility: HOSPITAL | Age: 74
LOS: 1 days | Discharge: HOME HEALTH | DRG: 543 | End: 2024-10-22
Attending: STUDENT IN AN ORGANIZED HEALTH CARE EDUCATION/TRAINING PROGRAM | Admitting: INTERNAL MEDICINE
Payer: MEDICARE

## 2024-10-20 DIAGNOSIS — R07.9 CHEST PAIN: ICD-10-CM

## 2024-10-20 DIAGNOSIS — S32.050A COMPRESSION FRACTURE OF L5 VERTEBRA, INITIAL ENCOUNTER: Primary | ICD-10-CM

## 2024-10-20 DIAGNOSIS — S32.059A CLOSED FRACTURE OF FIFTH LUMBAR VERTEBRA, UNSPECIFIED FRACTURE MORPHOLOGY, INITIAL ENCOUNTER: ICD-10-CM

## 2024-10-20 PROBLEM — K59.00 CONSTIPATION: Status: ACTIVE | Noted: 2024-10-20

## 2024-10-20 LAB
ALBUMIN SERPL BCP-MCNC: 4 G/DL (ref 3.5–5.2)
ALP SERPL-CCNC: 82 U/L (ref 55–135)
ALT SERPL W/O P-5'-P-CCNC: 7 U/L (ref 10–44)
ANION GAP SERPL CALC-SCNC: 8 MMOL/L (ref 8–16)
AST SERPL-CCNC: 14 U/L (ref 10–40)
BASOPHILS # BLD AUTO: 0.04 K/UL (ref 0–0.2)
BASOPHILS NFR BLD: 0.5 % (ref 0–1.9)
BILIRUB SERPL-MCNC: 1.3 MG/DL (ref 0.1–1)
BILIRUB UR QL STRIP: NEGATIVE
BUN SERPL-MCNC: 8 MG/DL (ref 8–23)
CALCIUM SERPL-MCNC: 9.3 MG/DL (ref 8.7–10.5)
CHLORIDE SERPL-SCNC: 99 MMOL/L (ref 95–110)
CLARITY UR: ABNORMAL
CO2 SERPL-SCNC: 28 MMOL/L (ref 23–29)
COLOR UR: YELLOW
CREAT SERPL-MCNC: 0.7 MG/DL (ref 0.5–1.4)
DIFFERENTIAL METHOD BLD: ABNORMAL
EOSINOPHIL # BLD AUTO: 0.1 K/UL (ref 0–0.5)
EOSINOPHIL NFR BLD: 1.2 % (ref 0–8)
ERYTHROCYTE [DISTWIDTH] IN BLOOD BY AUTOMATED COUNT: 14.9 % (ref 11.5–14.5)
EST. GFR  (NO RACE VARIABLE): >60 ML/MIN/1.73 M^2
GLUCOSE SERPL-MCNC: 114 MG/DL (ref 70–110)
GLUCOSE UR QL STRIP: NEGATIVE
HCT VFR BLD AUTO: 37 % (ref 37–48.5)
HGB BLD-MCNC: 12.5 G/DL (ref 12–16)
HGB UR QL STRIP: NEGATIVE
HYALINE CASTS #/AREA URNS LPF: 1 /LPF
IMM GRANULOCYTES # BLD AUTO: 0.02 K/UL (ref 0–0.04)
IMM GRANULOCYTES NFR BLD AUTO: 0.3 % (ref 0–0.5)
KETONES UR QL STRIP: ABNORMAL
LEUKOCYTE ESTERASE UR QL STRIP: ABNORMAL
LYMPHOCYTES # BLD AUTO: 1 K/UL (ref 1–4.8)
LYMPHOCYTES NFR BLD: 13.2 % (ref 18–48)
MAGNESIUM SERPL-MCNC: 2 MG/DL (ref 1.6–2.6)
MCH RBC QN AUTO: 31.5 PG (ref 27–31)
MCHC RBC AUTO-ENTMCNC: 33.8 G/DL (ref 32–36)
MCV RBC AUTO: 93 FL (ref 82–98)
MICROSCOPIC COMMENT: NORMAL
MONOCYTES # BLD AUTO: 0.8 K/UL (ref 0.3–1)
MONOCYTES NFR BLD: 9.9 % (ref 4–15)
NEUTROPHILS # BLD AUTO: 5.8 K/UL (ref 1.8–7.7)
NEUTROPHILS NFR BLD: 74.9 % (ref 38–73)
NITRITE UR QL STRIP: NEGATIVE
NRBC BLD-RTO: 0 /100 WBC
PH UR STRIP: 7 [PH] (ref 5–8)
PHOSPHATE SERPL-MCNC: 2.7 MG/DL (ref 2.7–4.5)
PLATELET # BLD AUTO: 269 K/UL (ref 150–450)
PMV BLD AUTO: 9.6 FL (ref 9.2–12.9)
POTASSIUM SERPL-SCNC: 4 MMOL/L (ref 3.5–5.1)
PROT SERPL-MCNC: 7.4 G/DL (ref 6–8.4)
PROT UR QL STRIP: ABNORMAL
RBC # BLD AUTO: 3.97 M/UL (ref 4–5.4)
RBC #/AREA URNS HPF: 2 /HPF (ref 0–4)
SODIUM SERPL-SCNC: 135 MMOL/L (ref 136–145)
SP GR UR STRIP: 1.01 (ref 1–1.03)
URN SPEC COLLECT METH UR: ABNORMAL
UROBILINOGEN UR STRIP-ACNC: NEGATIVE EU/DL
WBC # BLD AUTO: 7.71 K/UL (ref 3.9–12.7)
WBC #/AREA URNS HPF: 5 /HPF (ref 0–5)

## 2024-10-20 PROCEDURE — 25000003 PHARM REV CODE 250: Performed by: INTERNAL MEDICINE

## 2024-10-20 PROCEDURE — G0378 HOSPITAL OBSERVATION PER HR: HCPCS

## 2024-10-20 PROCEDURE — 25500020 PHARM REV CODE 255: Performed by: STUDENT IN AN ORGANIZED HEALTH CARE EDUCATION/TRAINING PROGRAM

## 2024-10-20 PROCEDURE — 85025 COMPLETE CBC W/AUTO DIFF WBC: CPT | Performed by: STUDENT IN AN ORGANIZED HEALTH CARE EDUCATION/TRAINING PROGRAM

## 2024-10-20 PROCEDURE — 84100 ASSAY OF PHOSPHORUS: CPT | Performed by: STUDENT IN AN ORGANIZED HEALTH CARE EDUCATION/TRAINING PROGRAM

## 2024-10-20 PROCEDURE — 99285 EMERGENCY DEPT VISIT HI MDM: CPT | Mod: 25

## 2024-10-20 PROCEDURE — 83735 ASSAY OF MAGNESIUM: CPT | Performed by: STUDENT IN AN ORGANIZED HEALTH CARE EDUCATION/TRAINING PROGRAM

## 2024-10-20 PROCEDURE — 81001 URINALYSIS AUTO W/SCOPE: CPT | Performed by: STUDENT IN AN ORGANIZED HEALTH CARE EDUCATION/TRAINING PROGRAM

## 2024-10-20 PROCEDURE — 80053 COMPREHEN METABOLIC PANEL: CPT | Performed by: STUDENT IN AN ORGANIZED HEALTH CARE EDUCATION/TRAINING PROGRAM

## 2024-10-20 RX ORDER — TALC
6 POWDER (GRAM) TOPICAL NIGHTLY PRN
Status: DISCONTINUED | OUTPATIENT
Start: 2024-10-20 | End: 2024-10-22 | Stop reason: HOSPADM

## 2024-10-20 RX ORDER — ACETAMINOPHEN 325 MG/1
650 TABLET ORAL EVERY 4 HOURS PRN
Status: DISCONTINUED | OUTPATIENT
Start: 2024-10-20 | End: 2024-10-22 | Stop reason: HOSPADM

## 2024-10-20 RX ORDER — LANOLIN ALCOHOL/MO/W.PET/CERES
800 CREAM (GRAM) TOPICAL
Status: DISCONTINUED | OUTPATIENT
Start: 2024-10-20 | End: 2024-10-22 | Stop reason: HOSPADM

## 2024-10-20 RX ORDER — HYDROCODONE BITARTRATE AND ACETAMINOPHEN 5; 325 MG/1; MG/1
1 TABLET ORAL EVERY 6 HOURS PRN
Status: DISCONTINUED | OUTPATIENT
Start: 2024-10-20 | End: 2024-10-22 | Stop reason: HOSPADM

## 2024-10-20 RX ORDER — ACETAMINOPHEN 500 MG
5000 TABLET ORAL DAILY
COMMUNITY

## 2024-10-20 RX ORDER — HYDROMORPHONE HYDROCHLORIDE 1 MG/ML
0.5 INJECTION, SOLUTION INTRAMUSCULAR; INTRAVENOUS; SUBCUTANEOUS EVERY 4 HOURS PRN
Status: DISCONTINUED | OUTPATIENT
Start: 2024-10-20 | End: 2024-10-22 | Stop reason: HOSPADM

## 2024-10-20 RX ORDER — SODIUM,POTASSIUM PHOSPHATES 280-250MG
2 POWDER IN PACKET (EA) ORAL
Status: DISCONTINUED | OUTPATIENT
Start: 2024-10-20 | End: 2024-10-22 | Stop reason: HOSPADM

## 2024-10-20 RX ORDER — ACETAMINOPHEN 325 MG/1
650 TABLET ORAL EVERY 8 HOURS PRN
Status: DISCONTINUED | OUTPATIENT
Start: 2024-10-20 | End: 2024-10-22 | Stop reason: HOSPADM

## 2024-10-20 RX ORDER — ONDANSETRON HYDROCHLORIDE 2 MG/ML
4 INJECTION, SOLUTION INTRAVENOUS EVERY 6 HOURS PRN
Status: DISCONTINUED | OUTPATIENT
Start: 2024-10-20 | End: 2024-10-22 | Stop reason: HOSPADM

## 2024-10-20 RX ORDER — ALUMINUM HYDROXIDE, MAGNESIUM HYDROXIDE, AND SIMETHICONE 1200; 120; 1200 MG/30ML; MG/30ML; MG/30ML
30 SUSPENSION ORAL 4 TIMES DAILY PRN
Status: DISCONTINUED | OUTPATIENT
Start: 2024-10-20 | End: 2024-10-22 | Stop reason: HOSPADM

## 2024-10-20 RX ORDER — NALOXONE HCL 0.4 MG/ML
0.02 VIAL (ML) INJECTION
Status: DISCONTINUED | OUTPATIENT
Start: 2024-10-20 | End: 2024-10-22 | Stop reason: HOSPADM

## 2024-10-20 RX ORDER — ALPRAZOLAM 0.5 MG/1
0.5 TABLET ORAL 3 TIMES DAILY PRN
Status: DISCONTINUED | OUTPATIENT
Start: 2024-10-20 | End: 2024-10-22 | Stop reason: HOSPADM

## 2024-10-20 RX ORDER — PANTOPRAZOLE SODIUM 40 MG/1
40 TABLET, DELAYED RELEASE ORAL
Status: DISCONTINUED | OUTPATIENT
Start: 2024-10-21 | End: 2024-10-22 | Stop reason: HOSPADM

## 2024-10-20 RX ADMIN — HYDROCODONE BITARTRATE AND ACETAMINOPHEN 1 TABLET: 5; 325 TABLET ORAL at 09:10

## 2024-10-20 RX ADMIN — ALPRAZOLAM 0.5 MG: 0.5 TABLET ORAL at 09:10

## 2024-10-20 RX ADMIN — IOHEXOL 100 ML: 350 INJECTION, SOLUTION INTRAVENOUS at 04:10

## 2024-10-20 RX ADMIN — Medication 6 MG: at 09:10

## 2024-10-20 NOTE — ED PROVIDER NOTES
Encounter Date: 10/20/2024       History     Chief Complaint   Patient presents with    Back Pain    Constipation    Abdominal Pain     X 10 days, back pain started 5 days ago      HPI    Heri Jansen is a 74 year old female who presented to the ED for evaluation of worsening lumbar back pain.  Patient has been seen in the emergency department on 2 separate days for evaluation of constipation but since evaluation in [place] yesterday patient indicates that she has had 2 bowel movements although liquid in nature.  She believes that she is still having constipation.  Patient is without abdominal pain at this time.  Patient states most of her pain is at point tenderness around L5, without any additional complaints.  Patient denies fever, night sweats, chills, nausea, vomiting, incontinence, IVDU, prolonged steroid use.  Patient does admit to a ground level fall about a week and a half ago landing on her bottom in his reported to have brittle bones.      Review of patient's allergies indicates:   Allergen Reactions    Levaquin [levofloxacin] Hives     Past Medical History:   Diagnosis Date    Anemia, unspecified 08/18/2024    Anxiety     GERD (gastroesophageal reflux disease)      Past Surgical History:   Procedure Laterality Date    CHOLECYSTECTOMY      COLONOSCOPY N/A 11/07/2022    Procedure: COLONOSCOPY;  Surgeon: Lex Morton MD;  Location: [facility] ENDO;  Service: General;  Laterality: N/A;    FRACTURE SURGERY      HEMIARTHROPLASTY OF HIP Right 11/13/2021    Procedure: HEMIARTHROPLASTY, HIP;  Surgeon: Dontae Lopez MD;  Location: [facility] OR;  Service: Orthopedics;  Laterality: Right;    LAPAROSCOPIC CHOLECYSTECTOMY Bilateral 11/30/2022    Procedure: CHOLECYSTECTOMY-LAPAROSCOPIC;  Surgeon: Lex Morton MD;  Location: [facility] OR;  Service: General;  Laterality: Bilateral;     Family History   Problem Relation Name Age of Onset    Heart disease Mother       Social History     Tobacco Use    Smoking  status: Never    Smokeless tobacco: Never   Substance Use Topics    Alcohol use: Yes     Comment: a bottle of wine or 6 glasses of beer daily    Drug use: No     Review of Systems   Constitutional:  Negative for activity change, chills, diaphoresis, fever and unexpected weight change.   HENT:  Negative for congestion and facial swelling.    Respiratory:  Negative for chest tightness and wheezing.    Cardiovascular:  Negative for chest pain.   Gastrointestinal:  Positive for constipation and diarrhea. Negative for abdominal distention, nausea and vomiting.   Genitourinary:  Negative for difficulty urinating and dysuria.   Musculoskeletal:  Positive for back pain. Negative for neck pain.   Neurological:  Negative for dizziness, weakness and headaches.       Physical Exam     Initial Vitals [10/20/24 1402]   BP Pulse Resp Temp SpO2   138/76 100 18 97.6 °F (36.4 °C) 97 %      MAP       --         Physical Exam    Nursing note and vitals reviewed.  Constitutional: She appears well-developed and well-nourished.   HENT:   Head: Normocephalic.   Right Ear: External ear normal.   Left Ear: External ear normal. Mouth/Throat: Oropharynx is clear and moist.   Eyes: EOM are normal. Pupils are equal, round, and reactive to light. Right eye exhibits no discharge. Left eye exhibits no discharge.   Neck: No JVD present.   Cardiovascular:  Normal rate and regular rhythm.           No murmur heard.  Pulmonary/Chest: Breath sounds normal. No respiratory distress. She has no wheezes. She has no rhonchi. She has no rales.   Abdominal: Abdomen is soft. She exhibits no distension and no mass. There is no abdominal tenderness. There is no rebound and no guarding.   Musculoskeletal:         General: Normal range of motion.     Neurological: She is alert. She has normal strength and normal reflexes. She displays normal reflexes. No cranial nerve deficit or sensory deficit.   Skin: Skin is warm. Capillary refill takes 2 to 3 seconds.          ED Course   Procedures  Labs Reviewed   CBC W/ AUTO DIFFERENTIAL - Abnormal       Result Value    WBC 7.71      RBC 3.97 (*)     Hemoglobin 12.5      Hematocrit 37.0      MCV 93      MCH 31.5 (*)     MCHC 33.8      RDW 14.9 (*)     Platelets 269      MPV 9.6      Immature Granulocytes 0.3      Gran # (ANC) 5.8      Immature Grans (Abs) 0.02      Lymph # 1.0      Mono # 0.8      Eos # 0.1      Baso # 0.04      nRBC 0      Gran % 74.9 (*)     Lymph % 13.2 (*)     Mono % 9.9      Eosinophil % 1.2      Basophil % 0.5      Differential Method Automated     COMPREHENSIVE METABOLIC PANEL - Abnormal    Sodium 135 (*)     Potassium 4.0      Chloride 99      CO2 28      Glucose 114 (*)     BUN 8      Creatinine 0.7      Calcium 9.3      Total Protein 7.4      Albumin 4.0      Total Bilirubin 1.3 (*)     Alkaline Phosphatase 82      AST 14      ALT 7 (*)     eGFR >60.0      Anion Gap 8     URINALYSIS, REFLEX TO URINE CULTURE - Abnormal    Specimen UA Urine, Clean Catch      Color, UA Yellow      Appearance, UA Hazy (*)     pH, UA 7.0      Specific Gravity, UA 1.015      Protein, UA Trace (*)     Glucose, UA Negative      Ketones, UA Trace (*)     Bilirubin (UA) Negative      Occult Blood UA Negative      Nitrite, UA Negative      Urobilinogen, UA Negative      Leukocytes, UA Trace (*)     Narrative:     Specimen Source->Urine   MAGNESIUM    Magnesium 2.0     PHOSPHORUS    Phosphorus 2.7     URINALYSIS MICROSCOPIC    RBC, UA 2      WBC, UA 5      Hyaline Casts, UA 1      Microscopic Comment SEE COMMENT      Narrative:     Specimen Source->Urine          Imaging Results              CT Abdomen Pelvis With IV Contrast NO Oral Contrast (Final result)  Result time 10/20/24 17:09:26      Final result by Damien Zhu MD (10/20/24 17:09:26)                   Impression:      Previously demonstrated compression fracture of the L5 vertebral body with apparent new superimposed acute appearing fracture of the inferior L5  endplate with increased height loss in comparison to prior study of 07/25/2022.  Multiple additional chronic appearing mild-to-moderate compression fractures of the remaining thoracolumbar spine.    Moderate volume retained stool throughout the colon, most pronounced throughout the right hemicolon.  No definite CT evidence to suggest high-grade small bowel obstruction.    Bibasilar atelectasis or scarring.    Cholecystectomy and hysterectomy.    Additional findings as above.      Electronically signed by: Damien Zhu MD  Date:    10/20/2024  Time:    17:09               Narrative:    EXAMINATION:  CT ABDOMEN PELVIS WITH IV CONTRAST    CLINICAL HISTORY:  Bowel obstruction suspected;    TECHNIQUE:  Low dose axial images, sagittal and coronal reformations were obtained from the lung bases to the pubic symphysis following the IV administration of 100 mL of Omnipaque 350 .  Oral contrast was not given.    COMPARISON:  CT enterography 07/25/2022    FINDINGS:  Please note image quality is mildly degraded by patient motion artifact.    There are bandlike opacities at the lung bases suggesting atelectasis or scarring.  No significant pleural fluid.  The visualized portions of the heart appear normal.    The liver is normal in size and attenuation with no focal hepatic abnormality.  Gallbladder is surgically absent.  There is minimal intra and extrahepatic biliary ductal dilatation likely relating to post cholecystectomy status.    The stomach, spleen, pancreas, and adrenal glands are unremarkable.    The kidneys are normal in size and location and enhance symmetrically.  There is no evidence of hydronephrosis. Urinary bladder appears within normal limits allowing for streak artifact from right hip arthroplasty hardware.  Uterus is surgically absent.    The abdominal aorta is normal in course and caliber with mild atherosclerotic calcification along its course.  No retroperitoneal hematoma.    No evidence to suggest  high-grade small bowel obstruction.  There is moderate volume retained stool probably throughout the right hemicolon.  The appendix is not definitively visualized.  There is no ascites, free fluid, or intraperitoneal free air. There are scattered shotty small mesenteric and retroperitoneal lymph nodes.    There is diffuse osteopenia.  There is postoperative change of prior right hip arthroplasty.  There are mild-to-moderate chronic appearing compression fracture deformities of the T11, T12, L1, L2, and L4 vertebral bodies.  There is a chronic appearing mild superior endplate deformity of the L3 vertebral body.  There is a previously demonstrated L5 compression fracture deformity, however this demonstrates an apparent new superimposed acute appearing fracture of the inferior L5 endplate with increased height loss in comparison to prior study of 07/25/2022.  No significant posterior retropulsion appreciated.  No acute grossly displaced sacral alar fracture identified.  No significant retropulsion appreciated.                                       Medications   iohexoL (OMNIPAQUE 350) injection 100 mL (100 mLs Intravenous Given 10/20/24 1635)     Medical Decision Making    This patient presents with back pain most consistent with musculoskeletal versus new fracture.. Differential diagnoses includes lumbago versus musculoskeletal spasm / strain versus sciatica, with possible fracture. No back pain red flags on history or physical. Presentation not consistent with malignancy as there is lack of history of malignancy, lack of B symptoms, cauda equina as there is no bowel or urinary incontinence/retention, no saddle anesthesia, no distal weakness, AAA, viscus perforation , pulmonary embolism, renal colic, pyelonephritis as she is afebrile, no CVAT, no urinary symptoms.  Given clinical picture of ground level fall with point tenderness over the lumbar spine as well as some constipation CT abdomen and pelvis and CT lumbar  spine was ordered.  CT abdomen and pelvis with moderate volume of retained stool throughout the colon, most pronounced at the right hemicolon with no evidence suggestive of small-bowel obstruction.  On L-spine imaging it was noted to have fracture of the L5 vertebral body that is apparently new superimposed over acute appearing fracture in the inferior portion of the L5 endplate with increased height loss [employer] to study performed on July 20, 2022.  There is also gas in the space between L5 and S1 at this time.  Neurosurgery was consulted for evaluation of imaging indicated they would drop in note with recommendations.  At this time because the patient was systemically well without signs of infection holding antibiotics as medicine may want to perform further evaluation.  Medicine was consulted in the setting of gas formation and concern for possible osteomyelitis versus abscess and agreed to admit the patient for continued monitoring and evaluation and workup as needed.             ED Course as of 10/20/24 1816   Sun Oct 20, 2024   1712 CT Abdomen Pelvis With IV Contrast NO Oral Contrast  Previously demonstrated compression fracture of the L5 vertebral body with apparent new superimposed acute appearing fracture of the inferior L5 endplate with increased height loss in comparison to prior study of 07/25/2022.  Multiple additional chronic appearing mild-to-moderate compression fractures of the remaining thoracolumbar spine.     Moderate volume retained stool throughout the colon, most pronounced throughout the right hemicolon.  No definite CT evidence to suggest high-grade small bowel obstruction.     Bibasilar atelectasis or scarring.     Cholecystectomy and hysterectomy.   [CH]   1717 Reaching out to radiology to discuss imaging   [CH]   1735 Reviewed previous imaging there is CT abdomen were able to appreciate the lumbar spine fracture does appear to be worse in the gas appears to be new when comparing to  imaging performed on 7/25/2022. [CH]   1741 Spoke with medicine who will come and evaluate for admission in the setting of gas formation and new worsening fracture which could be concerning for osteomyelitis. [CH]   1800 Spoke with Medicine asked if I would reach out to Neurosurgery for evaluation of imaging [CH]   1802 Spoke with neurosurgery indicated they would evaluate the imaging and place in note. [CH]      ED Course User Index  [CH] Adilson Ferro MD                             Clinical Impression:  Final diagnoses:  [S32.170X] Closed fracture of fifth lumbar vertebra, unspecified fracture morphology, initial encounter (Primary)                 Adilson Ferro MD  10/20/24 2222
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
## 2024-10-20 NOTE — SUBJECTIVE & OBJECTIVE
Past Medical History:   Diagnosis Date    Anemia, unspecified 2024    Anxiety     GERD (gastroesophageal reflux disease)        Past Surgical History:   Procedure Laterality Date    CHOLECYSTECTOMY      COLONOSCOPY N/A 2022    Procedure: COLONOSCOPY;  Surgeon: Lex Morton MD;  Location: [facility] ENDO;  Service: General;  Laterality: N/A;    FRACTURE SURGERY      HEMIARTHROPLASTY OF HIP Right 2021    Procedure: HEMIARTHROPLASTY, HIP;  Surgeon: Dontae Lopez MD;  Location: [facility] OR;  Service: Orthopedics;  Laterality: Right;    LAPAROSCOPIC CHOLECYSTECTOMY Bilateral 2022    Procedure: CHOLECYSTECTOMY-LAPAROSCOPIC;  Surgeon: Lex Morton MD;  Location: [facility] OR;  Service: General;  Laterality: Bilateral;       Review of patient's allergies indicates:   Allergen Reactions    Levaquin [levofloxacin] Hives       Current Facility-Administered Medications on File Prior to Encounter   Medication    ceFOXItin (MEFOXIN) 2 g in dextrose 5 % 50 mL IVPB     Current Outpatient Medications on File Prior to Encounter   Medication Sig    albuterol (PROVENTIL/VENTOLIN HFA) 90 mcg/actuation inhaler Inhale 1-2 puffs into the lungs every 6 (six) hours as needed for Wheezing or Shortness of Breath. Rescue (Patient not taking: Reported on 2023)    alprazolam (XANAX XR) 1 MG Tb24 Take 0.5 mg by mouth once daily.    ALPRAZolam (XANAX) 0.5 MG tablet SMARTSI Tablet(s) By Mouth Every 12 Hours PRN    azelastine (ASTELIN) 137 mcg (0.1 %) nasal spray TAKE 2 PUFFS EACH NOSTRIL TWICE A DAY    bisacodyL (DULCOLAX) 5 mg EC tablet Take 1 tablet (5 mg total) by mouth daily as needed for Constipation.    cholestyramine (QUESTRAN) 4 gram packet   = 1 packet, Oral, BID, # 60 EA, 0 Refill(s), Maintenance, Pharmacy: [facility], GERD (gastroesophageal reflux disease)  Diarrhea  Stricture esophagus, 158, cm, 22 9:22:00 CDT, Height/Length Measured, 44.8, kg, 22 9:22:00 CDT, Weight Do...     cyproheptadine (PERIACTIN) 4 mg tablet Take 0.125 mg by mouth 4 (four) times daily before meals and nightly. DISSOLVE 1 TABLET ORALLY EVERY 4 HOURS BEFORE MEALS    diphenhydrAMINE-aluminum-magnesium hydroxide-simethicone-LIDOcaine HCl 2% Swish and spit 10 mLs every 6 (six) hours as needed (mouth pain).    famotidine (PEPCID) 40 MG tablet 40 mg.    ferrous sulfate (FEOSOL) Tab tablet Take 1 tablet by mouth daily with breakfast.    HYDROcodone-acetaminophen (NORCO) 5-325 mg per tablet Take 1 tablet by mouth every 6 (six) hours as needed for Pain.    hyoscyamine (LEVSIN/SL) 0.125 mg Subl Place 0.125 mg under the tongue every 4 (four) hours as needed. Take sublingual before meals    lactulose (CHRONULAC) 20 gram/30 mL Soln Take 30 mLs (20 g total) by mouth 2 (two) times daily as needed (constipation).    lidocaine HCl 2% (XYLOCAINE) 2 % Soln Take 5 mLs by mouth every 4 (four) hours.    mometasone (NASONEX) 50 mcg/actuation nasal spray 2 sprays by Nasal route once daily.    omeprazole (PRILOSEC) 40 MG capsule Take 40 mg by mouth 2 (two) times daily.    traMADoL (ULTRAM) 50 mg tablet Take 50 mg by mouth 3 (three) times daily as needed.     Family History       Problem Relation (Age of Onset)    Heart disease Mother          Tobacco Use    Smoking status: Never    Smokeless tobacco: Never   Substance and Sexual Activity    Alcohol use: Yes     Comment: a bottle of wine or 6 glasses of beer daily    Drug use: No    Sexual activity: Not Currently     Review of Systems   Constitutional:  Negative for activity change and appetite change.   HENT:  Negative for congestion and dental problem.    Eyes:  Negative for discharge and itching.   Respiratory:  Negative for shortness of breath.    Cardiovascular:  Negative for chest pain.   Gastrointestinal:  Positive for constipation. Negative for abdominal distention and abdominal pain.   Endocrine: Negative for cold intolerance.   Genitourinary:  Negative for difficulty urinating and  dysuria.   Musculoskeletal:  Positive for back pain. Negative for arthralgias.   Skin:  Negative for color change.   Neurological:  Negative for dizziness and facial asymmetry.   Hematological:  Negative for adenopathy.   Psychiatric/Behavioral:  Negative for agitation and behavioral problems.      Objective:     Vital Signs (Most Recent):  Temp: 97.6 °F (36.4 °C) (10/20/24 1402)  Pulse: 104 (10/20/24 1802)  Resp: 18 (10/20/24 1402)  BP: 137/64 (10/20/24 1802)  SpO2: 97 % (10/20/24 1802) Vital Signs (24h Range):  Temp:  [97.6 °F (36.4 °C)] 97.6 °F (36.4 °C)  Pulse:  [] 104  Resp:  [18] 18  SpO2:  [96 %-99 %] 97 %  BP: (131-146)/(62-76) 137/64     Weight: 44.5 kg (98 lb)  Body mass index is 19.14 kg/m².     Physical Exam  Vitals and nursing note reviewed.   Constitutional:       General: She is not in acute distress.  HENT:      Head: Atraumatic.      Right Ear: External ear normal.      Left Ear: External ear normal.      Nose: Nose normal.      Mouth/Throat:      Mouth: Mucous membranes are moist.   Cardiovascular:      Rate and Rhythm: Normal rate.   Pulmonary:      Effort: Pulmonary effort is normal.   Musculoskeletal:         General: Normal range of motion.      Cervical back: Normal range of motion.   Skin:     General: Skin is warm.   Neurological:      Mental Status: She is alert and oriented to person, place, and time.   Psychiatric:         Behavior: Behavior normal.                Significant Labs: All pertinent labs within the past 24 hours have been reviewed.  CBC:   Recent Labs   Lab 10/20/24  1502   WBC 7.71   HGB 12.5   HCT 37.0        CMP:   Recent Labs   Lab 10/20/24  1502   *   K 4.0   CL 99   CO2 28   *   BUN 8   CREATININE 0.7   CALCIUM 9.3   PROT 7.4   ALBUMIN 4.0   BILITOT 1.3*   ALKPHOS 82   AST 14   ALT 7*   ANIONGAP 8       Significant Imaging: I have reviewed all pertinent imaging results/findings within the past 24 hours.

## 2024-10-20 NOTE — ED PROVIDER NOTES
"   History     Chief Complaint   Patient presents with    Constipation     Was seen in the ED for constipation yesterday. Took prescribed medications, but had very small amount of stool after. States that last normal BM was 2 days ago and she is now experiencing abdominal cramping.     HPI:  Heri Jansen is a 74 y.o. female with PMH as below who presents to the [facility] emergency department for evaluation of constipation for the past 2-3 days with associated low abdominal pressure/cramping. She hasn't had relief with OTC medications; seen yesterday and states minimal relief. She's passed small "pellets" but no large stool. She has no other complaints.       PCP: No, Primary Doctor    Review of patient's allergies indicates:   Allergen Reactions    Levaquin [levofloxacin] Hives      Past Medical History:   Diagnosis Date    Anemia, unspecified 08/18/2024    Anxiety     GERD (gastroesophageal reflux disease)      Past Surgical History:   Procedure Laterality Date    CHOLECYSTECTOMY      COLONOSCOPY N/A 11/07/2022    Procedure: COLONOSCOPY;  Surgeon: Lex Morton MD;  Location: [facility] ENDO;  Service: General;  Laterality: N/A;    FRACTURE SURGERY      HEMIARTHROPLASTY OF HIP Right 11/13/2021    Procedure: HEMIARTHROPLASTY, HIP;  Surgeon: Dontae Lopez MD;  Location: [facility] OR;  Service: Orthopedics;  Laterality: Right;    LAPAROSCOPIC CHOLECYSTECTOMY Bilateral 11/30/2022    Procedure: CHOLECYSTECTOMY-LAPAROSCOPIC;  Surgeon: Lex Morton MD;  Location: [facility] OR;  Service: General;  Laterality: Bilateral;       Family History   Problem Relation Name Age of Onset    Heart disease Mother       Social History     Tobacco Use    Smoking status: Never    Smokeless tobacco: Never   Substance and Sexual Activity    Alcohol use: Yes     Comment: a bottle of wine or 6 glasses of beer daily    Drug use: No    Sexual activity: Not Currently      Review of Systems     Review of Systems   Constitutional: " Negative.  Negative for fever.   HENT: Negative.     Eyes: Negative.    Respiratory: Negative.     Cardiovascular: Negative.    Gastrointestinal: Negative.  Negative for vomiting.   Endocrine: Negative.    Genitourinary: Negative.  Negative for dysuria.   Musculoskeletal: Negative.    Skin: Negative.    Allergic/Immunologic: Negative.    Neurological: Negative.    Hematological: Negative.    Psychiatric/Behavioral: Negative.     All other systems reviewed and are negative.       Physical Exam     Initial Vitals [10/19/24 1547]   BP Pulse Resp Temp SpO2   (!) 161/77 101 18 98.1 °F (36.7 °C) 98 %      MAP       --          Nursing notes and vital signs reviewed.  Constitutional: Patient is in no acute distress.   Head: Normocephalic. Atraumatic.   Eyes:  Conjunctivae are not pale. No scleral icterus.   ENT: Mucous membranes moist.   Neck: Supple.   Cardiovascular: Regular rate. Regular rhythm.   Pulmonary: No respiratory distress.   Abdominal: Soft, nondistended, nontender.   Rectal Exam: No masses or external lesions. No rectal tenderness.  No fecal impaction. Scant light brown stool on exam glove.    Musculoskeletal: Moves all extremities. No obvious deformities.   Skin: Warm and dry.   Neurological:  Alert, awake, and appropriate. Normal speech. No acute lateralizing neurologic deficits appreciated.   Psychiatric: Normal affect.       ED Course   Procedures  Vitals:    10/19/24 1547   BP: (!) 161/77   Pulse: 101   Resp: 18   Temp: 98.1 °F (36.7 °C)   TempSrc: Oral   SpO2: 98%   Weight: 44.5 kg (98 lb)   Height: 5' (1.524 m)     Lab Results Interpreted as Abnormal:  Labs Reviewed - No data to display   All Lab Results:  Results for orders placed or performed in visit on 09/29/23   BUN    Collection Time: 09/29/23  1:24 PM   Result Value Ref Range    BUN 12 8 - 23 mg/dL   Creatinine, serum    Collection Time: 09/29/23  1:24 PM   Result Value Ref Range    Creatinine 0.8 0.5 - 1.4 mg/dL    eGFR >60.0 >60 mL/min/1.73  m^2     Imaging Results    None        The emergency physician reviewed the vital signs / test results outlined above.     ED Discussion      No impaction on exam. Patient given enema with some relief. Discussed return precautions and continued treatment at home.     Patient's evaluation in the ED does not suggest any emergent or life-threatening medical conditions requiring immediate intervention beyond what was provided in the ED, and I believe patient is safe for discharge. Regardless, an unremarkable evaluation in the ED does not preclude the development or presence of a serious or life-threatening condition. As such, patient was given return instructions for any change or worsening of symptoms.       ED Medication(s) Administered:  Medications - No data to display    Prescription Management: I performed a review of the patient's current Rx medication list as input by nursing staff.    Discharge Medication List as of 10/19/2024  5:26 PM        START taking these medications    Details   lactulose (CHRONULAC) 20 gram/30 mL Soln Take 30 mLs (20 g total) by mouth 2 (two) times daily as needed (constipation)., Starting Sat 10/19/2024, Until Sat 10/26/2024 at 2359, Normal           CONTINUE these medications which have NOT CHANGED    Details   albuterol (PROVENTIL/VENTOLIN HFA) 90 mcg/actuation inhaler Inhale 1-2 puffs into the lungs every 6 (six) hours as needed for Wheezing or Shortness of Breath. Rescue, Starting Sat 3/25/2023, Normal      alprazolam (XANAX XR) 1 MG Tb24 Take 0.5 mg by mouth once daily., Until Discontinued, Historical Med      ALPRAZolam (XANAX) 0.5 MG tablet SMARTSI Tablet(s) By Mouth Every 12 Hours PRN, Historical Med      azelastine (ASTELIN) 137 mcg (0.1 %) nasal spray TAKE 2 PUFFS EACH NOSTRIL TWICE A DAY, Historical Med      bisacodyL (DULCOLAX) 5 mg EC tablet Take 1 tablet (5 mg total) by mouth daily as needed for Constipation., Starting Fri 10/18/2024, Normal      cholestyramine  (QUESTRAN) 4 gram packet   = 1 packet, Oral, BID, # 60 EA, 0 Refill(s), Maintenance, Pharmacy: [facility], GERD (gastroesophageal reflux disease)  Diarrhea  Stricture esophagus, 158, cm, 07/01/22 9:22:00 CDT, Height/Length Measured, 44.8, kg, 07/01/22 9:22:00 CDT, Weight Do. .., Historical Med      cyproheptadine (PERIACTIN) 4 mg tablet Take 0.125 mg by mouth 4 (four) times daily before meals and nightly. DISSOLVE 1 TABLET ORALLY EVERY 4 HOURS BEFORE MEALS, Starting Tue 3/30/2021, Historical Med      diphenhydrAMINE-aluminum-magnesium hydroxide-simethicone-LIDOcaine HCl 2% Swish and spit 10 mLs every 6 (six) hours as needed (mouth pain)., Starting Mon 8/21/2023, Print      famotidine (PEPCID) 40 MG tablet 40 mg., Starting Fri 7/30/2021, Until Wed 11/30/2022 at 2359, Historical Med      ferrous sulfate (FEOSOL) Tab tablet Take 1 tablet by mouth daily with breakfast., Historical Med      HYDROcodone-acetaminophen (NORCO) 5-325 mg per tablet Take 1 tablet by mouth every 6 (six) hours as needed for Pain., Starting Thu 3/17/2022, Normal      hyoscyamine (LEVSIN/SL) 0.125 mg Subl Place 0.125 mg under the tongue every 4 (four) hours as needed. Take sublingual before meals, Historical Med      lidocaine HCl 2% (XYLOCAINE) 2 % Soln Take 5 mLs by mouth every 4 (four) hours., Starting 11/4/2016, Until Discontinued, Print      mometasone (NASONEX) 50 mcg/actuation nasal spray 2 sprays by Nasal route once daily., Starting Fri 8/4/2023, Normal      omeprazole (PRILOSEC) 40 MG capsule Take 40 mg by mouth 2 (two) times daily., Starting Thu 11/18/2021, Historical Med      traMADoL (ULTRAM) 50 mg tablet Take 50 mg by mouth 3 (three) times daily as needed., Starting Fri 6/10/2022, Historical Med               Follow-up Information       Schedule an appointment as soon as possible for a visit  with your primary care physician.               [facility] Emergency Dept.    Specialty: Emergency Medicine  Why: As needed, If symptoms  worsen  Contact information:  [place] [address] [phone]                          Clinical Impression       ICD-10-CM ICD-9-CM   1. Constipation, unspecified constipation type  K59.00 564.00      ED Disposition Condition    Discharge Stable             Kevin Sanchez MD  10/20/24 0652

## 2024-10-20 NOTE — CONSULTS
Notes, scans labs reviewed. Pt has multiple compression fractures of lumbar spine. Recommend conservative treatment. May use TLSO brace for pain control though not necessary. Will arrange outpatient follow up.

## 2024-10-20 NOTE — ASSESSMENT & PLAN NOTE
CT abdomen showed  diffuse osteopenia and superimposed acute appearing fracture of the inferior L5 endplate with increased height loss  Also  mild-to-moderate chronic appearing compression fracture deformities of the T11, T12, L1, L2, and L4 vertebral bodies.    There is a chronic appearing mild superior endplate deformity of the L3 vertebral body.    Pt doesn't have symptoms of cauda equina syndrome  Since she wont consent for MRI spine will do CT lumbar spine  Neurosurgeon consulted  Pain management
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
## 2024-10-20 NOTE — LETTER
October 22, 2024         [address]  [facility] [address]  Phone: [phone]  Fax: [phone]       Patient: Heri Jansen   YOB: 1950  Date of Visit: 10/20/2024    To Whom It May Concern:    Janki Jansen  was at [facility] on 10/20/2024. The patient may return to work after she is seen by her PCP.  If you have any questions or concerns, or if I can be of further assistance, please do not hesitate to contact me.    Sincerely,        Dr. Santos/Kim

## 2024-10-21 LAB
ALBUMIN SERPL BCP-MCNC: 3.6 G/DL (ref 3.5–5.2)
ALP SERPL-CCNC: 83 U/L (ref 55–135)
ALT SERPL W/O P-5'-P-CCNC: 6 U/L (ref 10–44)
ANION GAP SERPL CALC-SCNC: 5 MMOL/L (ref 8–16)
AST SERPL-CCNC: 11 U/L (ref 10–40)
BASOPHILS # BLD AUTO: 0.06 K/UL (ref 0–0.2)
BASOPHILS NFR BLD: 0.9 % (ref 0–1.9)
BILIRUB SERPL-MCNC: 1.3 MG/DL (ref 0.1–1)
BUN SERPL-MCNC: 7 MG/DL (ref 8–23)
CALCIUM SERPL-MCNC: 9.1 MG/DL (ref 8.7–10.5)
CHLORIDE SERPL-SCNC: 99 MMOL/L (ref 95–110)
CO2 SERPL-SCNC: 29 MMOL/L (ref 23–29)
CREAT SERPL-MCNC: 0.6 MG/DL (ref 0.5–1.4)
CRP SERPL-MCNC: 6.2 MG/DL
DIFFERENTIAL METHOD BLD: ABNORMAL
EOSINOPHIL # BLD AUTO: 0.4 K/UL (ref 0–0.5)
EOSINOPHIL NFR BLD: 5.3 % (ref 0–8)
ERYTHROCYTE [DISTWIDTH] IN BLOOD BY AUTOMATED COUNT: 14.8 % (ref 11.5–14.5)
ERYTHROCYTE [SEDIMENTATION RATE] IN BLOOD BY WESTERGREN METHOD: 14 MM/HR (ref 0–20)
EST. GFR  (NO RACE VARIABLE): >60 ML/MIN/1.73 M^2
GLUCOSE SERPL-MCNC: 94 MG/DL (ref 70–110)
HCT VFR BLD AUTO: 32.6 % (ref 37–48.5)
HGB BLD-MCNC: 11.6 G/DL (ref 12–16)
IMM GRANULOCYTES # BLD AUTO: 0.01 K/UL (ref 0–0.04)
IMM GRANULOCYTES NFR BLD AUTO: 0.2 % (ref 0–0.5)
LYMPHOCYTES # BLD AUTO: 1.3 K/UL (ref 1–4.8)
LYMPHOCYTES NFR BLD: 19.5 % (ref 18–48)
MAGNESIUM SERPL-MCNC: 2 MG/DL (ref 1.6–2.6)
MCH RBC QN AUTO: 33 PG (ref 27–31)
MCHC RBC AUTO-ENTMCNC: 35.6 G/DL (ref 32–36)
MCV RBC AUTO: 93 FL (ref 82–98)
MONOCYTES # BLD AUTO: 0.8 K/UL (ref 0.3–1)
MONOCYTES NFR BLD: 11.4 % (ref 4–15)
NEUTROPHILS # BLD AUTO: 4.1 K/UL (ref 1.8–7.7)
NEUTROPHILS NFR BLD: 62.7 % (ref 38–73)
NRBC BLD-RTO: 0 /100 WBC
PLATELET # BLD AUTO: 260 K/UL (ref 150–450)
PMV BLD AUTO: 9.8 FL (ref 9.2–12.9)
POCT GLUCOSE: 96 MG/DL (ref 70–110)
POTASSIUM SERPL-SCNC: 3.9 MMOL/L (ref 3.5–5.1)
PROT SERPL-MCNC: 6.6 G/DL (ref 6–8.4)
RBC # BLD AUTO: 3.51 M/UL (ref 4–5.4)
SODIUM SERPL-SCNC: 133 MMOL/L (ref 136–145)
WBC # BLD AUTO: 6.58 K/UL (ref 3.9–12.7)

## 2024-10-21 PROCEDURE — 36415 COLL VENOUS BLD VENIPUNCTURE: CPT | Performed by: INTERNAL MEDICINE

## 2024-10-21 PROCEDURE — 85025 COMPLETE CBC W/AUTO DIFF WBC: CPT | Performed by: INTERNAL MEDICINE

## 2024-10-21 PROCEDURE — 85651 RBC SED RATE NONAUTOMATED: CPT | Performed by: NURSE PRACTITIONER

## 2024-10-21 PROCEDURE — 86140 C-REACTIVE PROTEIN: CPT | Performed by: NURSE PRACTITIONER

## 2024-10-21 PROCEDURE — 21400001 HC TELEMETRY ROOM

## 2024-10-21 PROCEDURE — 97116 GAIT TRAINING THERAPY: CPT

## 2024-10-21 PROCEDURE — 97161 PT EVAL LOW COMPLEX 20 MIN: CPT

## 2024-10-21 PROCEDURE — 80053 COMPREHEN METABOLIC PANEL: CPT | Performed by: INTERNAL MEDICINE

## 2024-10-21 PROCEDURE — 25000003 PHARM REV CODE 250: Performed by: INTERNAL MEDICINE

## 2024-10-21 PROCEDURE — 36415 COLL VENOUS BLD VENIPUNCTURE: CPT | Performed by: NURSE PRACTITIONER

## 2024-10-21 PROCEDURE — 99223 1ST HOSP IP/OBS HIGH 75: CPT | Mod: ,,, | Performed by: NEUROLOGICAL SURGERY

## 2024-10-21 PROCEDURE — 25000003 PHARM REV CODE 250: Performed by: NURSE PRACTITIONER

## 2024-10-21 PROCEDURE — 83735 ASSAY OF MAGNESIUM: CPT | Performed by: INTERNAL MEDICINE

## 2024-10-21 RX ORDER — LACTULOSE 10 G/15ML
30 SOLUTION ORAL 3 TIMES DAILY
Status: DISCONTINUED | OUTPATIENT
Start: 2024-10-21 | End: 2024-10-22

## 2024-10-21 RX ADMIN — LACTULOSE 30 G: 20 SOLUTION ORAL at 09:10

## 2024-10-21 RX ADMIN — LACTULOSE 30 G: 20 SOLUTION ORAL at 02:10

## 2024-10-21 RX ADMIN — ALPRAZOLAM 0.5 MG: 0.5 TABLET ORAL at 09:10

## 2024-10-21 RX ADMIN — Medication 6 MG: at 11:10

## 2024-10-21 RX ADMIN — ALUMINUM HYDROXIDE, MAGNESIUM HYDROXIDE, AND SIMETHICONE 30 ML: 200; 200; 20 SUSPENSION ORAL at 09:10

## 2024-10-21 RX ADMIN — HYDROCODONE BITARTRATE AND ACETAMINOPHEN 1 TABLET: 5; 325 TABLET ORAL at 02:10

## 2024-10-21 RX ADMIN — HYDROCODONE BITARTRATE AND ACETAMINOPHEN 1 TABLET: 5; 325 TABLET ORAL at 11:10

## 2024-10-21 NOTE — ASSESSMENT & PLAN NOTE
CT abdomen showed  diffuse osteopenia and superimposed acute appearing fracture of the inferior L5 endplate with increased height loss  Also  mild-to-moderate chronic appearing compression fracture deformities of the T11, T12, L1, L2, and L4 vertebral bodies.    There is a chronic appearing mild superior endplate deformity of the L3 vertebral body.    Pt doesn't have symptoms of cauda equina syndrome  Since she wont consent for MRI spine will do CT lumbar spine-L5 compression fx  Neurosurgeon consulted  Pain management

## 2024-10-21 NOTE — PLAN OF CARE
Pt was explained KNOTT. Pt verbalized understanding of KNOTT and signed. KNOTT scanned to .     10/21/24 1000   KNOTT Message   Medicare Outpatient and Observation Notification regarding financial responsibility Explained to patient/caregiver;Signed/date by patient/caregiver   Date KNOTT was signed 10/21/24   Time KNOTT was signed 1000

## 2024-10-21 NOTE — PLAN OF CARE
[facility]  Initial Discharge Assessment       Primary Care Provider: Jai White MD    Admission Diagnosis: Closed fracture of fifth lumbar vertebra, unspecified fracture morphology, initial encounter [S32.059A]    Admission Date: 10/20/2024  Expected Discharge Date: 10/21/2024    Transition of Care Barriers: (P) None     met with Pt and adult children at bedside to complete an initial discharge assessment. Pt AAOx4s.  Demographics, PCP, and insurance verified. Pt has No dialysis.     Pt reports an inability to complete ADLs without assistance. Pt and family requesting wheelchair and cane. SW informed RN CM via secure chat.     Pt verbalized plan to discharge home via family transport.     During SDOH assessment, pt and family shared that there is increased financial strain that is impacting access to meds. Pt and family also shared that there is stress and grief that is also causing a strain. With that, SW to provide pt and family with financial and mental health resources for [place].    SW also informed treatment team that medical literature should be given and explained to pt's children.    Pt has no other needs to be addressed at this time.     Payor: MEDICARE / Plan: MEDICARE PART A & B / Product Type: Government /     Extended Emergency Contact Information  Primary Emergency Contact: Shawna Mahan  Address: [address]  Home Phone: [phone]  Mobile Phone: [phone]  Relation: Daughter  Secondary Emergency Contact: Ru Mahan Jr, Jr.  Address: [address]  Home Phone: [phone]  Mobile Phone: [phone]  Relation: Son  Preferred language: English   needed? No    Discharge Plan A: (P) Home with family  Discharge Plan B: (P) Home Health      [facility] [place] - [address]  [address]  [address]  [place] [address]  Phone: [phone] Fax:  [phone]    [facility] - [facility], [place] - [address]  [address]  [facility] [place] [address]  Phone: [phone] Fax: [phone]    [id number] - [place], [place] - [address]  [address] [place] [address]  Phone: [phone] Fax: [phone]    [facility] [place] - [address]  [facility] [address]  [place] [address]  Phone: [phone] Fax: [phone]    [id number] - [place], [place] - [address]  [address]  [place] [address]  Phone: [phone] Fax: [phone]      Initial Assessment (most recent)       Adult Discharge Assessment - 10/21/24 1335          Discharge Assessment    Assessment Type Discharge Planning Assessment     Confirmed/corrected address, phone number and insurance Yes     Confirmed Demographics Correct on Facesheet     Source of Information patient;family     When was your last doctors appointment? --   week - two weeks    Does patient/caregiver understand observation status Yes     Reason For Admission Compression fracture of L5 vertebra     People in Home child(yariel), adult (P)      Facility Arrived From: home (P)      Do you expect to return to your current living situation? Yes (P)      Do you have help at home or someone to help you manage your care at home? Yes (P)      Who are your caregiver(s) and their phone number(s)? NegritoShawna (Daughter)  [phone] (Mobile) (P)      Prior to hospitilization cognitive status: Unable to Assess (P)      Current cognitive status: Alert/Oriented (P)      Walking or Climbing Stairs Difficulty yes (P)      Walking or Climbing Stairs ambulation difficulty, assistance 1 person;stair climbing difficulty, assistance 1 person (P)      Dressing/Bathing Difficulty no (P)      Home Accessibility not wheelchair accessible;stairs to enter home (P)      Number of Stairs, Main Entrance four (P)      Stair Railings, Main  Entrance railings safe and in good condition (P)      Home Layout Able to live on 1st floor (P)      Equipment Currently Used at Home none (P)      Readmission within 30 days? No (P)      Patient currently being followed by outpatient case management? No (P)      Do you currently have service(s) that help you manage your care at home? No (P)      Do you take prescription medications? Yes (P)      Do you have prescription coverage? Yes (P)      Coverage MEDICARE - MEDICARE PART A & B - (P)      Do you have any problems affording any of your prescribed medications? Yes (P)      Is the patient taking medications as prescribed? yes (P)      Who is going to help you get home at discharge? Shawna Mahan (Daughter)  [phone] (Mobile) (P)      How do you get to doctors appointments? family or friend will provide (P)      Are you on dialysis? No (P)      Do you take coumadin? No (P)      Discharge Plan A Home with family (P)      Discharge Plan B Home Health (P)      DME Needed Upon Discharge  cane, quad;wheelchair (P)      Discharge Plan discussed with: Patient;Adult children (P)      Transition of Care Barriers None (P)         Physical Activity    On average, how many days per week do you engage in moderate to strenuous exercise (like a brisk walk)? 0 days (P)      On average, how many minutes do you engage in exercise at this level? 0 min (P)         Financial Resource Strain    How hard is it for you to pay for the very basics like food, housing, medical care, and heating? Hard (P)         Housing Stability    In the last 12 months, was there a time when you were not able to pay the mortgage or rent on time? Yes (P)      At any time in the past 12 months, were you homeless or living in a shelter (including now)? No (P)         Transportation Needs    Has the lack of transportation kept you from medical appointments, meetings, work or from getting things needed for daily living? No (P)         Food Insecurity     Within the past 12 months, you worried that your food would run out before you got the money to buy more. Never true (P)      Within the past 12 months, the food you bought just didn't last and you didn't have money to get more. Never true (P)         Stress    Do you feel stress - tense, restless, nervous, or anxious, or unable to sleep at night because your mind is troubled all the time - these days? Rather much (P)         Social Isolation    How often do you feel lonely or isolated from those around you?  Never (P)         Alcohol Use    Q1: How often do you have a drink containing alcohol? Monthly or less (P)      Q2: How many drinks containing alcohol do you have on a typical day when you are drinking? 1 or 2 (P)      Q3: How often do you have six or more drinks on one occasion? Never (P)         Utilities    In the past 12 months has the electric, gas, oil, or water company threatened to shut off services in your home? No (P)         Health Literacy    How often do you need to have someone help you when you read instructions, pamphlets, or other written material from your doctor or pharmacy? Sometimes (P)    Memory - explain and give literature to pt's children       OTHER    Name(s) of People in Home ChristokatiamirtaShawna (Daughter)  [phone] (Mobile) (P)
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
## 2024-10-21 NOTE — CONSULTS
[facility]  Neurosurgery  Consult Note    Consults  Subjective:     Chief Complaint/Reason for Admission: Consulted for evaluation of L5 compression fx in the setting of multiple lumbar and lower T-spine fractures    History of Present Illness: 73 yo F with h/o osteopenia and multiple spinal compression fx's who fell about 2-3 weeks ago resulting in increased LBP without any sign of cauda equina or radiculopathy. No motor deficit.     Facility-Administered Medications Prior to Admission   Medication    ceFOXItin (MEFOXIN) 2 g in dextrose 5 % 50 mL IVPB     PTA Medications   Medication Sig    ALPRAZolam (XANAX) 0.5 MG tablet Take 0.5 mg by mouth every 12 (twelve) hours as needed for Anxiety.    azelastine (ASTELIN) 137 mcg (0.1 %) nasal spray 2 sprays by Nasal route 2 (two) times daily.    bisacodyL (DULCOLAX) 5 mg EC tablet Take 1 tablet (5 mg total) by mouth daily as needed for Constipation.    cholecalciferol, vitamin D3, (VITAMIN D3) 125 mcg (5,000 unit) Tab Take 5,000 Units by mouth once daily.    ferrous sulfate (FEOSOL) Tab tablet Take 1 tablet by mouth daily with breakfast.    mometasone (NASONEX) 50 mcg/actuation nasal spray 2 sprays by Nasal route once daily.    omeprazole (PRILOSEC) 40 MG capsule Take 40 mg by mouth 2 (two) times daily.    lactulose (CHRONULAC) 20 gram/30 mL Soln Take 30 mLs (20 g total) by mouth 2 (two) times daily as needed (constipation). (Patient not taking: Reported on 10/20/2024)       Review of patient's allergies indicates:   Allergen Reactions    Levaquin [levofloxacin] Hives       Past Medical History:   Diagnosis Date    Anemia, unspecified 08/18/2024    Anxiety     GERD (gastroesophageal reflux disease)      Past Surgical History:   Procedure Laterality Date    CHOLECYSTECTOMY      COLONOSCOPY N/A 11/07/2022    Procedure: COLONOSCOPY;  Surgeon: Lex Morton MD;  Location: [facility];  Service: General;  Laterality: N/A;    FRACTURE SURGERY       "HEMIARTHROPLASTY OF HIP Right 11/13/2021    Procedure: HEMIARTHROPLASTY, HIP;  Surgeon: Dontae Lopez MD;  Location: [facility] OR;  Service: Orthopedics;  Laterality: Right;    LAPAROSCOPIC CHOLECYSTECTOMY Bilateral 11/30/2022    Procedure: CHOLECYSTECTOMY-LAPAROSCOPIC;  Surgeon: Lex Morton MD;  Location: [facility] OR;  Service: General;  Laterality: Bilateral;     Family History       Problem Relation (Age of Onset)    Heart disease Mother          Tobacco Use    Smoking status: Never    Smokeless tobacco: Never   Substance and Sexual Activity    Alcohol use: Yes     Comment: a bottle of wine or 6 glasses of beer daily    Drug use: No    Sexual activity: Not Currently     Review of Systems  Objective:     Weight: 42.2 kg (93 lb 1.6 oz)  Body mass index is 18.18 kg/m².  Vital Signs (Most Recent):  Temp: 98.1 °F (36.7 °C) (10/21/24 1633)  Pulse: 98 (10/21/24 1633)  Resp: (!) 1 (10/21/24 1633)  BP: (!) 122/58 (10/21/24 1633)  SpO2: 95 % (10/21/24 1633) Vital Signs (24h Range):  Temp:  [97.5 °F (36.4 °C)-98.4 °F (36.9 °C)] 98.1 °F (36.7 °C)  Pulse:  [] 98  Resp:  [1-18] 1  SpO2:  [95 %-98 %] 95 %  BP: (101-166)/(54-72) 122/58     Date 10/21/24 0700 - 10/22/24 0659   Shift 2857-3241 3772-0866 6203-2254 24 Hour Total   INTAKE   P.O. 884 200  1084   Shift Total(mL/kg) 884(20.9) 200(4.7)  1084(25.7)   OUTPUT   Shift Total(mL/kg)       Weight (kg) 42.2 42.2 42.2 42.2          Neurosurgery Physical Exam    Significant Labs:  Recent Labs   Lab 10/20/24  1502 10/21/24  0433   * 94   * 133*   K 4.0 3.9   CL 99 99   CO2 28 29   BUN 8 7*   CREATININE 0.7 0.6   CALCIUM 9.3 9.1   MG 2.0 2.0     Recent Labs   Lab 10/20/24  1502 10/21/24  0433   WBC 7.71 6.58   HGB 12.5 11.6*   HCT 37.0 32.6*    260     No results for input(s): "LABPT", "INR", "APTT" in the last 48 hours.  Microbiology Results (last 7 days)       ** No results found for the last 168 hours. **          Recent Lab Results         " 10/21/24  1238   10/21/24  0807   10/21/24  0433        Albumin     3.6       ALP     83       ALT     6       Anion Gap     5       AST     11       Baso #     0.06       Basophil %     0.9       BILIRUBIN TOTAL     1.3  Comment: For infants and newborns, interpretation of results should be based  on gestational age, weight and in agreement with clinical  observations.    Premature Infant recommended reference ranges:  Up to 24 hours.............<8.0 mg/dL  Up to 48 hours............<12.0 mg/dL  3-5 days..................<15.0 mg/dL  6-29 days.................<15.0 mg/dL         BUN     7       Calcium     9.1       Chloride     99       CO2     29       Creatinine     0.6       CRP 6.20  Comment: CRP-Normal Application expected values:   <1.0        mg/dL   Normal Range  1.0 - 5.0  mg/dL   Indicates mild inflammation  5.0 - 10.0 mg/dL   Indicates severe inflammation  >10.0        mg/dL   Represents serious processes and   frequently         indicates the presence of a bacterial   infection.              Differential Method     Automated       eGFR     >60.0       Eos #     0.4       Eos %     5.3       Glucose     94       Gran # (ANC)     4.1       Gran %     62.7       Hematocrit     32.6       Hemoglobin     11.6       Immature Grans (Abs)     0.01  Comment: Mild elevation in immature granulocytes is non specific and   can be seen in a variety of conditions including stress response,   acute inflammation, trauma and pregnancy. Correlation with other   laboratory and clinical findings is essential.         Immature Granulocytes     0.2       Lymph #     1.3       Lymph %     19.5       Magnesium      2.0       MCH     33.0       MCHC     35.6       MCV     93       Mono #     0.8       Mono %     11.4       MPV     9.8       nRBC     0       Platelet Count     260       POCT Glucose   96         Potassium     3.9       PROTEIN TOTAL     6.6       RBC     3.51       RDW     14.8       Sed Rate 14           Sodium      133       WBC     6.58             All pertinent labs from the last 24 hours have been reviewed.  Significant Diagnostics:  CT: No results found in the last 24 hours.  I have reviewed all pertinent imaging results/findings within the past 24 hours.  I have reviewed and interpreted all pertinent imaging results/findings within the past 24 hours.    EXAMINATION:  CT LUMBAR SPINE WITHOUT CONTRAST     CLINICAL HISTORY:  L5 Fracture;     TECHNIQUE:  Low-dose axial, sagittal and coronal reformations are obtained through the lumbar spine.  Contrast was not administered.     COMPARISON:  CT 10/20/2024     FINDINGS:  There is an irregular non-corticated lucency transversing the inferior endplate of the L5 vertebral body concerning for acute fracture.  There is increased height loss of the L5 vertebral body compared to prior CT of 07/25/2022.  No significant posterior retropulsion appreciated.  There are chronic appearing mild-to-moderate compression deformities of the remaining visualized T12 through L4 vertebral bodies.  There is diffuse osteopenia.  Alignment appears within normal limits.  There is multilevel degenerative discogenic change.  There is vacuum disc phenomena present at L5-S1.     There are degenerative changes of the lumbar spine as detailed below:     T12-L1: Circumferential disc bulge.  Superimposed central posterior disc osteophyte complex arising from the superior endplate of the L1 vertebral body with mild spinal canal stenosis.  There is mild bilateral neural foraminal narrowing.     L1-L2: Circumferential disc bulge.  Superimposed central posterior disc osteophyte complex arising from the inferior endplate of the L1 vertebral body.  Mild spinal canal stenosis.  Bilateral facet arthropathy.  Mild bilateral neural foraminal narrowing.     L2-L3: Circumferential disc bulge, ligamentum flavum thickening, and bilateral facet arthropathy.  There is at most mild spinal canal stenosis.  Mild to moderate  bilateral neural foraminal narrowing.     L3-L4: Circumferential disc bulge, ligamentum flavum thickening, and bilateral facet arthropathy.  There is mild spinal canal stenosis.  There is moderate right-sided and mild left-sided neural foraminal narrowing.     L4-L5: Circumferential disc bulge, ligamentum flavum thickening, and bilateral facet arthropathy.  There is moderate spinal canal stenosis.  There is severe bilateral neural foraminal narrowing.     L5-S1: Circumferential disc bulge and prominent ligamentum flavum thickening.  Mild-to-moderate spinal canal stenosis.  Mild to moderate bilateral neural foraminal narrowing.     The sacrum appears grossly intact without definite acute displaced fracture identified.  There are degenerative changes of bilateral sacroiliac joints.     Please refer to the previous dictated CT for intra-abdominal findings.     Impression:     Acute appearing fracture involving the inferior endplate of the L5 vertebral body superimposed upon a chronic L5 compression deformity with interval increased height loss.     Diffuse osteopenia.  Additional chronic appearing mild-to-moderate multilevel compression fracture deformities of the visualized thoracolumbar spine.     Moderately advanced multilevel degenerative changes of the lumbar spine, most pronounced at the levels of L4-L5 and L5-S1 as above.  Please see above for level by level details.       Assessment/Plan:     Active Diagnoses:    Diagnosis Date Noted POA    PRINCIPAL PROBLEM:  Compression fracture of L5 vertebra [S32.050A] 10/20/2024 Yes    Constipation [K59.00] 10/20/2024 Yes      Problems Resolved During this Admission:     73 yo F with multiple osteopenic spinal fractures - mostly old, with a possible new L5 worsening of older L5 fractures. Nothing indicates significant spinal compression or instability warranting acute surgical intervention. I agree w Dr. Hunter that this should be first treated conservatively w TLSO  bracing and pain management. If patient remains inpatient then recommend LS spine MRI without contrast. Otherwise can get it as an outpatient    Will arrange for follow-up with neurosurgery in 2-3 weeks      Thank you for your consult. I will sign off. Please contact us if you have any additional questions.    Thomas Lux MD  Neurosurgery  On license of [facility]

## 2024-10-21 NOTE — PLAN OF CARE
To address the strains identified during the initial assessment, the SW provided resources.    Resources addressed:    Financial Strain - [facility] and Family Support Services    Utilities - [facility]    Food Insecurities - Food [employer]    Stress - Mental Health Treatment list    Health Literacy - Informed Nurse and treatment team of memory

## 2024-10-21 NOTE — CARE UPDATE
Pt remains constipated.  SS enema ordered.  Start lactulose.  Dr. Lux to review imaging again as family had concern regarding gas in the space between L5 and S1 as explained to them by the ED physician.  Concern for possible osteomyelitis/abscess was noted by ED physician.  While sed rate is WNL, CRP is elevated. No fever or leukocytosis.  Imaging independently reviewed by myself with concern for abscess low, but awaiting further recommendations by neurosurgery.   Plan dc tomorrow after treatment of constipation if cleared by neurosurgery.

## 2024-10-21 NOTE — SUBJECTIVE & OBJECTIVE
Interval History: no acute events overnight.  PT and neurosurgery eval pending.    Review of Systems   Constitutional:  Negative for activity change and appetite change.   HENT:  Negative for congestion and dental problem.    Eyes:  Negative for discharge and itching.   Respiratory:  Negative for shortness of breath.    Cardiovascular:  Negative for chest pain.   Gastrointestinal:  Positive for constipation. Negative for abdominal distention and abdominal pain.   Endocrine: Negative for cold intolerance.   Genitourinary:  Negative for difficulty urinating and dysuria.   Musculoskeletal:  Positive for back pain. Negative for arthralgias.   Skin:  Negative for color change.   Neurological:  Negative for dizziness and facial asymmetry.   Hematological:  Negative for adenopathy.   Psychiatric/Behavioral:  Negative for agitation and behavioral problems.      Objective:     Vital Signs (Most Recent):  Temp: 98.3 °F (36.8 °C) (10/21/24 0805)  Pulse: 82 (10/21/24 0805)  Resp: 16 (10/21/24 0805)  BP: (!) 101/54 (10/21/24 0805)  SpO2: 97 % (10/21/24 0805) Vital Signs (24h Range):  Temp:  [97.5 °F (36.4 °C)-98.4 °F (36.9 °C)] 98.3 °F (36.8 °C)  Pulse:  [] 82  Resp:  [15-18] 16  SpO2:  [95 %-99 %] 97 %  BP: (101-166)/(54-76) 101/54     Weight: 42.2 kg (93 lb 1.6 oz)  Body mass index is 18.18 kg/m².    Intake/Output Summary (Last 24 hours) at 10/21/2024 0920  Last data filed at 10/21/2024 0825  Gross per 24 hour   Intake 120 ml   Output 200 ml   Net -80 ml         Physical Exam  Vitals and nursing note reviewed.   Constitutional:       General: She is not in acute distress.  HENT:      Head: Atraumatic.      Right Ear: External ear normal.      Left Ear: External ear normal.      Nose: Nose normal.      Mouth/Throat:      Mouth: Mucous membranes are moist.   Cardiovascular:      Rate and Rhythm: Normal rate.   Pulmonary:      Effort: Pulmonary effort is normal.   Abdominal:      General: Bowel sounds are normal. There is  no distension.      Palpations: Abdomen is soft.   Musculoskeletal:         General: Normal range of motion.      Cervical back: Normal range of motion.   Skin:     General: Skin is warm and dry.   Neurological:      General: No focal deficit present.      Mental Status: She is alert and oriented to person, place, and time.   Psychiatric:         Mood and Affect: Mood normal.         Behavior: Behavior normal.             Significant Labs: All pertinent labs within the past 24 hours have been reviewed.  CBC:   Recent Labs   Lab 10/20/24  1502 10/21/24  0433   WBC 7.71 6.58   HGB 12.5 11.6*   HCT 37.0 32.6*    260     CMP:   Recent Labs   Lab 10/20/24  1502 10/21/24  0433   * 133*   K 4.0 3.9   CL 99 99   CO2 28 29   * 94   BUN 8 7*   CREATININE 0.7 0.6   CALCIUM 9.3 9.1   PROT 7.4 6.6   ALBUMIN 4.0 3.6   BILITOT 1.3* 1.3*   ALKPHOS 82 83   AST 14 11   ALT 7* 6*   ANIONGAP 8 5*       Significant Imaging: I have reviewed all pertinent imaging results/findings within the past 24 hours.

## 2024-10-21 NOTE — PT/OT/SLP EVAL
Physical Therapy Evaluation    Patient Name:  Heri Jansen   MRN:  [id number]    Recommendations:     Discharge Recommendations: Low Intensity Therapy   Discharge Equipment Recommendations: walker, rolling (pediatric)   Barriers to discharge: None    Assessment:     Heri Jansen is a 74 y.o. female admitted with a medical diagnosis of Compression fracture of L5 vertebra.  She presents with the following impairments/functional limitations: weakness, impaired endurance, impaired functional mobility, gait instability, impaired balance, decreased lower extremity function, pain, orthopedic precautions. Patient is agreeable to participation with PT evaluation. She reports 6/10 back pain at rest. She lives with her daughter and does not use an AD for ambulation at baseline. She was educated on log rolling and spinal precautions. She performs supine <> sit <> stand with SBA. She ambulated 100' with RW and CGA. She declines sitting up in chair and returned to bed with bed alarm on, daughter present, and all needs met.      Heri's mobility limitation cannot be sufficiently resolved by the use of a cane. Her functional mobility deficit can be sufficiently resolved with the use of a pediatric Rolling Walker. Patient's mobility limitation significantly impairs their ability to participate in one of more activities of daily living.  The use of a pediatric RW will significantly improve the patient's ability to participate in MRADLS and the patient will use it on regular basis in the home.    Rehab Prognosis: Good; patient would benefit from acute skilled PT services to address these deficits and reach maximum level of function.    Recent Surgery: * No surgery found *      Plan:     During this hospitalization, patient to be seen 5 x/week to address the identified rehab impairments via gait training, therapeutic activities, therapeutic exercises and progress toward the following goals:    Plan of Care Expires:   11/21/24    Subjective     Chief Complaint: back pain which decreased with ambulation   Patient/Family Comments/goals: daughter asking how long patient should be out of work as she works as a  2 days a week for about 9 hours - PT informed family to direct that question to neurosurgery team   Pain/Comfort:  Pain Rating 1: 6/10  Location 1: back  Pain Addressed 1: Reposition, Distraction    Patients cultural, spiritual, Temple conflicts given the current situation:      Living Environment:  Patient lives with daughter in a mobile home with 3 PALOMO and B rails   Prior to admission, patients level of function was no AD for ambulation at baseline. She endorses 1 fall 2 weeks ago. She denies any recent PT. (+) . She had a R hip fracture a few years ago.  Equipment used at home: none.  DME owned (not currently used): rolling walker and shower chair.  Upon discharge, patient will have assistance from family.    Objective:     Communicated with JUAN Whiting prior to session.  Patient found HOB elevated with bed alarm, telemetry  upon PT entry to room.    General Precautions: Standard, fall  Orthopedic Precautions:spinal precautions   Braces:  (neurosurgery consult states TLSO for comfort with no TLSO at bedside)  Respiratory Status: Room air    Exams:  RLE Strength: 4/5  LLE Strength: 4/5    Functional Mobility:  Bed Mobility:     Supine to Sit: stand by assistance  Transfers:     Sit to Stand:  stand by assistance with rolling walker  Gait: 100' with RW and CGA      AM-PAC 6 CLICK MOBILITY  Total Score:23       Treatment & Education:  Patient was educated on the importance of OOB activity and functional mobility to negate negative effects of prolonged bed rest during hospitalization, safe transfers and ambulation, log rolling, spinal precautions, and D/C planning     Patient left HOB elevated with all lines intact, call button in reach, bed alarm on, RN notified, and daughter present.    GOALS:    Multidisciplinary Problems       Physical Therapy Goals          Problem: Physical Therapy    Goal Priority Disciplines Outcome Interventions   Physical Therapy Goal     PT, PT/OT     Description: Goals to be met by: 24    Patient will increase functional independence with mobility by performin. Supine to sit with Supervision  2. Sit to stand transfer with Supervision  3. Bed to chair transfer with Supervision using pediatric Rolling Walker  4. Gait  x 250 feet with Supervision using pediatric Rolling Walker.   5. Lower extremity exercise program x20 reps per handout, with supervision                       History:     Past Medical History:   Diagnosis Date    Anemia, unspecified 2024    Anxiety     GERD (gastroesophageal reflux disease)        Past Surgical History:   Procedure Laterality Date    CHOLECYSTECTOMY      COLONOSCOPY N/A 2022    Procedure: COLONOSCOPY;  Surgeon: Lex Morton MD;  Location: [facility];  Service: General;  Laterality: N/A;    FRACTURE SURGERY      HEMIARTHROPLASTY OF HIP Right 2021    Procedure: HEMIARTHROPLASTY, HIP;  Surgeon: Dontae Lopez MD;  Location: [facility] OR;  Service: Orthopedics;  Laterality: Right;    LAPAROSCOPIC CHOLECYSTECTOMY Bilateral 2022    Procedure: CHOLECYSTECTOMY-LAPAROSCOPIC;  Surgeon: Lex Morton MD;  Location: [facility] OR;  Service: General;  Laterality: Bilateral;       Time Tracking:     PT Received On: 10/21/24  PT Start Time: 1318     PT Stop Time: 1338  PT Total Time (min): 20 min     Billable Minutes: Evaluation 10 and Gait Training 10      10/21/2024

## 2024-10-22 VITALS
SYSTOLIC BLOOD PRESSURE: 143 MMHG | HEART RATE: 95 BPM | RESPIRATION RATE: 18 BRPM | DIASTOLIC BLOOD PRESSURE: 64 MMHG | BODY MASS INDEX: 18.28 KG/M2 | WEIGHT: 93.13 LBS | OXYGEN SATURATION: 95 % | HEIGHT: 60 IN | TEMPERATURE: 98 F

## 2024-10-22 PROBLEM — K59.00 CONSTIPATION: Status: RESOLVED | Noted: 2024-10-20 | Resolved: 2024-10-22

## 2024-10-22 LAB
ALBUMIN SERPL BCP-MCNC: 3.6 G/DL (ref 3.5–5.2)
ALP SERPL-CCNC: 84 U/L (ref 55–135)
ALT SERPL W/O P-5'-P-CCNC: 7 U/L (ref 10–44)
ANION GAP SERPL CALC-SCNC: 7 MMOL/L (ref 8–16)
AST SERPL-CCNC: 13 U/L (ref 10–40)
BASOPHILS # BLD AUTO: 0.05 K/UL (ref 0–0.2)
BASOPHILS NFR BLD: 0.8 % (ref 0–1.9)
BILIRUB SERPL-MCNC: 0.8 MG/DL (ref 0.1–1)
BUN SERPL-MCNC: 8 MG/DL (ref 8–23)
CALCIUM SERPL-MCNC: 8.8 MG/DL (ref 8.7–10.5)
CHLORIDE SERPL-SCNC: 98 MMOL/L (ref 95–110)
CO2 SERPL-SCNC: 28 MMOL/L (ref 23–29)
CREAT SERPL-MCNC: 0.6 MG/DL (ref 0.5–1.4)
DIFFERENTIAL METHOD BLD: ABNORMAL
EOSINOPHIL # BLD AUTO: 0.4 K/UL (ref 0–0.5)
EOSINOPHIL NFR BLD: 6.1 % (ref 0–8)
ERYTHROCYTE [DISTWIDTH] IN BLOOD BY AUTOMATED COUNT: 14.6 % (ref 11.5–14.5)
EST. GFR  (NO RACE VARIABLE): >60 ML/MIN/1.73 M^2
GLUCOSE SERPL-MCNC: 111 MG/DL (ref 70–110)
HCT VFR BLD AUTO: 35.1 % (ref 37–48.5)
HGB BLD-MCNC: 11.7 G/DL (ref 12–16)
IMM GRANULOCYTES # BLD AUTO: 0.01 K/UL (ref 0–0.04)
IMM GRANULOCYTES NFR BLD AUTO: 0.2 % (ref 0–0.5)
LYMPHOCYTES # BLD AUTO: 1.3 K/UL (ref 1–4.8)
LYMPHOCYTES NFR BLD: 19.6 % (ref 18–48)
MAGNESIUM SERPL-MCNC: 1.9 MG/DL (ref 1.6–2.6)
MCH RBC QN AUTO: 30.2 PG (ref 27–31)
MCHC RBC AUTO-ENTMCNC: 33.3 G/DL (ref 32–36)
MCV RBC AUTO: 91 FL (ref 82–98)
MONOCYTES # BLD AUTO: 0.7 K/UL (ref 0.3–1)
MONOCYTES NFR BLD: 10.4 % (ref 4–15)
NEUTROPHILS # BLD AUTO: 4.1 K/UL (ref 1.8–7.7)
NEUTROPHILS NFR BLD: 62.9 % (ref 38–73)
NRBC BLD-RTO: 0 /100 WBC
PLATELET # BLD AUTO: 298 K/UL (ref 150–450)
PMV BLD AUTO: 9.6 FL (ref 9.2–12.9)
POTASSIUM SERPL-SCNC: 4.1 MMOL/L (ref 3.5–5.1)
PROT SERPL-MCNC: 6.5 G/DL (ref 6–8.4)
RBC # BLD AUTO: 3.87 M/UL (ref 4–5.4)
SODIUM SERPL-SCNC: 133 MMOL/L (ref 136–145)
WBC # BLD AUTO: 6.54 K/UL (ref 3.9–12.7)

## 2024-10-22 PROCEDURE — 36415 COLL VENOUS BLD VENIPUNCTURE: CPT | Performed by: INTERNAL MEDICINE

## 2024-10-22 PROCEDURE — 83735 ASSAY OF MAGNESIUM: CPT | Performed by: INTERNAL MEDICINE

## 2024-10-22 PROCEDURE — 80053 COMPREHEN METABOLIC PANEL: CPT | Performed by: INTERNAL MEDICINE

## 2024-10-22 PROCEDURE — 25000003 PHARM REV CODE 250: Performed by: NURSE PRACTITIONER

## 2024-10-22 PROCEDURE — 25000003 PHARM REV CODE 250: Performed by: INTERNAL MEDICINE

## 2024-10-22 PROCEDURE — 97116 GAIT TRAINING THERAPY: CPT | Mod: CQ

## 2024-10-22 PROCEDURE — 85025 COMPLETE CBC W/AUTO DIFF WBC: CPT | Performed by: INTERNAL MEDICINE

## 2024-10-22 RX ORDER — HYDROCODONE BITARTRATE AND ACETAMINOPHEN 5; 325 MG/1; MG/1
1 TABLET ORAL EVERY 6 HOURS PRN
Qty: 12 TABLET | Refills: 0 | Status: SHIPPED | OUTPATIENT
Start: 2024-10-22

## 2024-10-22 RX ADMIN — PANTOPRAZOLE SODIUM 40 MG: 40 TABLET, DELAYED RELEASE ORAL at 05:10

## 2024-10-22 RX ADMIN — LACTULOSE 30 G: 20 SOLUTION ORAL at 08:10
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
## 2024-10-22 NOTE — NURSING
Patient discharged home per orders, all instructions reviewed with patient and patient's daughter and both verbalized understanding.  IV discontinued x 1, Patient's TLSO Brace delivered and worn as ordered, patient transferred to wheelchair x 1, all belongings by patients side.  Patient's daughter to transport her home.

## 2024-10-22 NOTE — HOSPITAL COURSE
Admitted with back pain found to have acute L5 compression fracture.  Had a fall a couple of weeks ago.  She had many chronic appearing compression fractures as well on imaging.  There was vacuum disc phenomenon between L5-S1 and this was specifically discussed with the neurosurgeon and confirmed to be not an acute concern.  There were otherwise no signs of infection.  Given pain medicines as needed.  She had no focal neurological deficits.  Neurosurgery was consulted and TLSO brace recommended.  To follow up with outpatient neurosurgeon.  Worked with PT.  To have outpatient therapy.  Also had constipation which resolved without issue.  Recommended to see PCP within 1 week.  Patient seen and examined on day of discharge.    Physical exam on day of discharge:  General - Patient alert and oriented x3 in NAD  CV - Regular rate and rhythm  Resp -  No increased WOB   Extrem-  No cyanosis, clubbing, edema.   Skin -  No masses, rashes or lesions noted on cursory skin exam.  Neuro - no focal deficit

## 2024-10-22 NOTE — PLAN OF CARE
Discharge orders and chart reviewed. No other discharge needs noted at this time. Pt is clear for discharge from case management. Pt is discharging to home.    Patient and daughter kindly refusing home health at this time - requesting to resume OP therapy. Ambulatory Referral to Therapy ordered for resumption of care    PCP appointment scheduled and added to AVS. [employer] message sent to Dr. Lux's clinic requesting follow up appointment - clinic to contact patient     TLSO brace delivered to bedside    Daughter at bedside to transport patient home    1242 - family with questions regarding billing for TLSO brace - CM escalated to CM leaders. Family requesting phone call update from leaders to family member Charles [phone]     10/22/24 1159   Final Note   Assessment Type Final Discharge Note   Anticipated Discharge Disposition Home   What phone number can be called within the next 1-3 days to see how you are doing after discharge? [phone]   Hospital Resources/Appts/Education Provided Appointments scheduled and added to AVS   Post-Acute Status   Post-Acute Authorization E   E Status Set-up Complete/Auth obtained   Discharge Delays None known at this time

## 2024-10-22 NOTE — PLAN OF CARE
[facility] brace line notified of need for TLSO brace - to be delivered to bedside later today     10/22/24 9234   Post-Acute Status   Post-Acute Authorization [facility] Status Referrals Sent

## 2024-10-23 ENCOUNTER — TELEPHONE (OUTPATIENT)
Dept: NEUROSURGERY | Facility: CLINIC | Age: 74
End: 2024-10-23
Payer: MEDICARE

## 2024-10-23 ENCOUNTER — PATIENT OUTREACH (OUTPATIENT)
Dept: ADMINISTRATIVE | Facility: CLINIC | Age: 74
End: 2024-10-23
Payer: MEDICARE

## 2024-10-23 DIAGNOSIS — S32.059G CLOSED FRACTURE OF FIFTH LUMBAR VERTEBRA WITH DELAYED HEALING, UNSPECIFIED FRACTURE MORPHOLOGY, SUBSEQUENT ENCOUNTER: Primary | ICD-10-CM

## 2024-10-23 NOTE — TELEPHONE ENCOUNTER
"----- Message from Nurse Argueta sent at 10/22/2024  4:54 PM CDT -----  Regarding: FW: Scheduling    ----- Message -----  From: Annelise Hanson  Sent: 10/22/2024  12:06 PM CDT  To: Deja Lowe RN; #  Subject: Scheduling                                       Good afternoon,    Dr. Lux has advised to schedule the two to three week post-op appointment with Dr. Shrestha. Can I please have assistance with scheduling? Thank you.  ----- Message -----  From: Nata English RN  Sent: 10/22/2024  11:58 AM CDT  To: Jose J OLSON Staff    Good morning, patient seen by Dr. Lux while at [facility]. Per Dr. Lux's note, "Otherwise can get it as an outpatient     Will arrange for follow-up with neurosurgery in 2-3 weeks"    Please schedule hospital follow up appointment and contact patient with appointment date/time.     Thank you  Nata MARTINEZ CM  "

## 2024-10-23 NOTE — TELEPHONE ENCOUNTER
"----- Message from Nurse Kendall sent at 10/23/2024 10:43 AM CDT -----  Regarding: FW: Scheduling    ----- Message -----  From: Kendall Crystal LPN  Sent: 10/22/2024   4:54 PM CDT  To: Kendall Crystal LPN  Subject: FW: Scheduling                                     ----- Message -----  From: Annelise Hanson  Sent: 10/22/2024  12:06 PM CDT  To: Deja Lowe RN; #  Subject: Scheduling                                       Good afternoon,    Dr. Lux has advised to schedule the two to three week post-op appointment with Dr. Shrestha. Can I please have assistance with scheduling? Thank you.  ----- Message -----  From: Nata English RN  Sent: 10/22/2024  11:58 AM CDT  To: Jose J OLSON Staff    Good morning, patient seen by Dr. Lux while at [facility]. Per Dr. Lux's note, "Otherwise can get it as an outpatient     Will arrange for follow-up with neurosurgery in 2-3 weeks"    Please schedule hospital follow up appointment and contact patient with appointment date/time.     Thank you  Nata MARTINEZ CM  "

## 2024-10-23 NOTE — PROGRESS NOTES
C3 nurse attempted to contact Heri Jansen  for a TCC post hospital discharge follow up call. No answer. Left voicemail with callback information. The patient has a scheduled HOSFU appointment with Jai White MD  on 10/31/2024 @ 1100.   Unable to route to PCP staff.

## 2024-10-23 NOTE — TELEPHONE ENCOUNTER
Called pt son in law and provided appt details for f/u appt with this office, provided office phone number as well as directions for office visit and xray.

## 2024-11-13 ENCOUNTER — OFFICE VISIT (OUTPATIENT)
Dept: NEUROSURGERY | Facility: CLINIC | Age: 74
End: 2024-11-13
Payer: MEDICARE

## 2024-11-13 ENCOUNTER — HOSPITAL ENCOUNTER (OUTPATIENT)
Dept: RADIOLOGY | Facility: HOSPITAL | Age: 74
Discharge: HOME | End: 2024-11-13
Attending: HEALTH CARE PROVIDER
Payer: MEDICARE

## 2024-11-13 VITALS
DIASTOLIC BLOOD PRESSURE: 82 MMHG | BODY MASS INDEX: 18.25 KG/M2 | SYSTOLIC BLOOD PRESSURE: 136 MMHG | HEART RATE: 97 BPM | WEIGHT: 92.94 LBS | HEIGHT: 60 IN

## 2024-11-13 DIAGNOSIS — S32.059G CLOSED FRACTURE OF FIFTH LUMBAR VERTEBRA WITH DELAYED HEALING, UNSPECIFIED FRACTURE MORPHOLOGY, SUBSEQUENT ENCOUNTER: ICD-10-CM

## 2024-11-13 DIAGNOSIS — S32.059G CLOSED FRACTURE OF FIFTH LUMBAR VERTEBRA WITH DELAYED HEALING, UNSPECIFIED FRACTURE MORPHOLOGY, SUBSEQUENT ENCOUNTER: Primary | ICD-10-CM

## 2024-11-13 PROCEDURE — 99213 OFFICE O/P EST LOW 20 MIN: CPT | Mod: PBBFAC,PN | Performed by: HEALTH CARE PROVIDER

## 2024-11-13 PROCEDURE — 99999 PR PBB SHADOW E&M-EST. PATIENT-LVL III: CPT | Mod: PBBFAC,,, | Performed by: HEALTH CARE PROVIDER

## 2024-11-13 PROCEDURE — 72100 X-RAY EXAM L-S SPINE 2/3 VWS: CPT | Mod: TC

## 2024-11-13 PROCEDURE — 72100 X-RAY EXAM L-S SPINE 2/3 VWS: CPT | Mod: 26,,, | Performed by: RADIOLOGY

## 2024-11-13 NOTE — PROGRESS NOTES
Neurosurgery  Established Patient    SUBJECTIVE:     History of Present Illness:  Heri Jansen is a 74 y.o. female with past medical history of diverticulitis, chronic cholecystitis, GERD present for L5 compression fracture follow-up.  On 10/20/2024, patient presented to emergency department for back pain after a ground level fall 1 week prior.  During encounter, CT abdomen/pelvis with IV contrast revealed new acute inferior L5 compression fracture with height loss along with multilevel chronic compression fractures.  Neurosurgery was consulted and recommended TLSO brace and pain management.    Today, patient is present with her son and daughter-in-law.  She reports mild improvement in lumbar pain.  She also denies usage of TLSO brace because it does not relieve pain.  She is currently taking Norco 5-325 mg which does improve pain. She denies new extremity weakness/pain or paresthesias.  She also denies taking calcium or vitamin-D for brittle bones.      OBJECTIVE:     Vital Signs  Pulse: 97  BP: 136/82  Pain Score:   5  Height: 5' (152.4 cm)  Weight: 42.1 kg (92 lb 14.8 oz)  Body mass index is 18.15 kg/m².    Neurosurgery Physical Exam  General:  No acute distress.  Neurologic: Alert and oriented. Thought content appropriate.  GCS: E4 V5 M6; Total: 15  Mental Status: Awake, Alert, Oriented x 4  Language: No aphasia  Speech: No dysarthria  Cranial nerves: face symmetric, tongue midline, CN II-XII grossly intact.   Eyes: pupils equal, round, reactive to light with accomodation, EOMI.   Pulmonary: normal respirations, no signs of respiratory distress  Skin: Skin is warm, dry and intact.    Motor Strength: Moves all extremities spontaneously with good tone.  No abnormal movements seen.   Bilateral upper extremity strength deltoid/biceps/triceps/hand  5/5   Bilateral lower extremity strength iliopsoas/TA/EHL/gastrocnemius 5/5     Tender to palpation along midline lumbar spine.    Diagnostic Imaging:  I have  personally reviewed lumbar extra images obtained on 11/13/2024 with patient which demonstrates multilevel chronic compression fractures with stable height loss.  No change in L5 compression fracture when compared to prior CT.    ASSESSMENT/PLAN:     1. Closed fracture of fifth lumbar vertebra with delayed healing, unspecified fracture morphology, subsequent encounter      Today, patient reports mild improvement in lumbar pain.  She is neurologically intact.  Lumbar x-ray revealed stable compression fractures without acute findings.  Family and patient is interested in kyphoplasty to aid with pain management.  Pain management referral placed for kyphoplasty consideration.    I advised patient on fracture risks associated with osteoporosis.  I also advised patient to follow-up with Dr. White (PCP) regarding treatment.    Patient can follow-up as needed.          Closed fracture of fifth lumbar vertebra with delayed healing, unspecified fracture morphology, subsequent encounter  -     Ambulatory referral/consult to Pain Clinic; Future; Expected date: 11/20/2024         I spent a total of 51 minutes non-face and face to face time preparing to see the patient (eg, review of tests), obtaining and/or reviewing separately obtained history, documenting clinical information in the electronic or other health record, interpreting results and communicating results to the patient/family/caregiver, or care coordinator.      Sharon Paris PA-C  Neurosurgery  [facility]

## 2024-12-02 DIAGNOSIS — R10.9 UNSPECIFIED ABDOMINAL PAIN: ICD-10-CM

## 2024-12-02 DIAGNOSIS — M48.56XA COLLAPSED VERTEBRA, NOT ELSEWHERE CLASSIFIED, LUMBAR REGION, INITIAL ENCOUNTER FOR FRACTURE: ICD-10-CM

## 2024-12-02 DIAGNOSIS — R07.9 CHEST PAIN, UNSPECIFIED: Primary | ICD-10-CM

## 2024-12-05 ENCOUNTER — TELEPHONE (OUTPATIENT)
Dept: RADIOLOGY | Facility: HOSPITAL | Age: 74
End: 2024-12-05

## 2024-12-06 ENCOUNTER — HOSPITAL ENCOUNTER (OUTPATIENT)
Dept: RADIOLOGY | Facility: HOSPITAL | Age: 74
Discharge: HOME | End: 2024-12-06
Attending: FAMILY MEDICINE
Payer: MEDICARE

## 2024-12-06 DIAGNOSIS — R07.9 CHEST PAIN, UNSPECIFIED: ICD-10-CM

## 2024-12-06 DIAGNOSIS — R10.9 UNSPECIFIED ABDOMINAL PAIN: ICD-10-CM

## 2024-12-06 DIAGNOSIS — M48.56XA COLLAPSED VERTEBRA, NOT ELSEWHERE CLASSIFIED, LUMBAR REGION, INITIAL ENCOUNTER FOR FRACTURE: ICD-10-CM

## 2024-12-06 PROCEDURE — 71046 X-RAY EXAM CHEST 2 VIEWS: CPT | Mod: 26,,, | Performed by: RADIOLOGY

## 2024-12-06 PROCEDURE — 74176 CT ABD & PELVIS W/O CONTRAST: CPT | Mod: TC

## 2024-12-06 PROCEDURE — 72131 CT LUMBAR SPINE W/O DYE: CPT | Mod: TC

## 2024-12-06 PROCEDURE — 72131 CT LUMBAR SPINE W/O DYE: CPT | Mod: 26,,, | Performed by: RADIOLOGY

## 2024-12-06 PROCEDURE — 71046 X-RAY EXAM CHEST 2 VIEWS: CPT | Mod: TC

## 2024-12-06 PROCEDURE — 74176 CT ABD & PELVIS W/O CONTRAST: CPT | Mod: 26,,, | Performed by: RADIOLOGY

## 2024-12-16 ENCOUNTER — OFFICE VISIT (OUTPATIENT)
Dept: PAIN MEDICINE | Facility: CLINIC | Age: 74
End: 2024-12-16
Payer: MEDICARE

## 2024-12-16 VITALS
BODY MASS INDEX: 18.06 KG/M2 | HEIGHT: 60 IN | HEART RATE: 109 BPM | DIASTOLIC BLOOD PRESSURE: 79 MMHG | WEIGHT: 92 LBS | SYSTOLIC BLOOD PRESSURE: 138 MMHG

## 2024-12-16 DIAGNOSIS — K59.03 OPIOID-INDUCED CONSTIPATION: ICD-10-CM

## 2024-12-16 DIAGNOSIS — F40.240 CLAUSTROPHOBIA: ICD-10-CM

## 2024-12-16 DIAGNOSIS — T40.2X5A OPIOID-INDUCED CONSTIPATION: ICD-10-CM

## 2024-12-16 DIAGNOSIS — S32.050G COMPRESSION FRACTURE OF L5 VERTEBRA WITH DELAYED HEALING, SUBSEQUENT ENCOUNTER: Primary | ICD-10-CM

## 2024-12-16 DIAGNOSIS — M80.00XG OSTEOPOROSIS WITH CURRENT PATHOLOGICAL FRACTURE WITH DELAYED HEALING, UNSPECIFIED OSTEOPOROSIS TYPE, SUBSEQUENT ENCOUNTER: ICD-10-CM

## 2024-12-16 PROCEDURE — 99999 PR PBB SHADOW E&M-EST. PATIENT-LVL III: CPT | Mod: PBBFAC,,, | Performed by: STUDENT IN AN ORGANIZED HEALTH CARE EDUCATION/TRAINING PROGRAM

## 2024-12-16 PROCEDURE — 99204 OFFICE O/P NEW MOD 45 MIN: CPT | Mod: S$PBB,,, | Performed by: STUDENT IN AN ORGANIZED HEALTH CARE EDUCATION/TRAINING PROGRAM

## 2024-12-16 PROCEDURE — 99213 OFFICE O/P EST LOW 20 MIN: CPT | Mod: PBBFAC,PN | Performed by: STUDENT IN AN ORGANIZED HEALTH CARE EDUCATION/TRAINING PROGRAM

## 2024-12-16 RX ORDER — LACTULOSE 10 G/15ML
20 SOLUTION ORAL 2 TIMES DAILY PRN
Qty: 900 ML | Refills: 1 | Status: SHIPPED | OUTPATIENT
Start: 2024-12-16

## 2024-12-16 RX ORDER — HYDROCODONE BITARTRATE AND ACETAMINOPHEN 10; 325 MG/1; MG/1
1 TABLET ORAL EVERY 6 HOURS PRN
Qty: 28 TABLET | Refills: 0 | Status: SHIPPED | OUTPATIENT
Start: 2024-12-16

## 2024-12-16 RX ORDER — SENNOSIDES 8.6 MG/1
1 CAPSULE, GELATIN COATED ORAL DAILY PRN
Qty: 30 EACH | Refills: 1 | Status: SHIPPED | OUTPATIENT
Start: 2024-12-16

## 2024-12-16 RX ORDER — NALOXONE HYDROCHLORIDE 4 MG/.1ML
1 SPRAY NASAL ONCE
Qty: 1 EACH | Refills: 0 | Status: SHIPPED | OUTPATIENT
Start: 2024-12-16 | End: 2024-12-16
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
## 2025-01-10 ENCOUNTER — TELEPHONE (OUTPATIENT)
Dept: PAIN MEDICINE | Facility: CLINIC | Age: 75
End: 2025-01-10
Payer: MEDICARE

## 2025-01-10 NOTE — TELEPHONE ENCOUNTER
----- Message from Sabra sent at 1/10/2025  2:50 PM CST -----  Contact: Ru [phone]  Type: Needs Medical Advice  Who Called:  pts son in law Ru     Best Call Back Number: [phone]    Additional Information: Ru stated no one one has called them to schedule NM Fusion Spect CT. Pls call back and advise

## 2025-01-10 NOTE — TELEPHONE ENCOUNTER
Dr Wright I am not sure if this was not ordered correctly or if we do these scans. I do not see a referral I see the order but no referral. Do you know where you would like this pt to go for this scan? If so can you please reorder it as external?  I found out that the only place that does these is [facility] called radiology nuclear med  and  was gone for the day will try again on Monday. [phone] ask for nuclear med.

## 2025-01-11 ENCOUNTER — HOSPITAL ENCOUNTER (EMERGENCY)
Facility: HOSPITAL | Age: 75
Discharge: HOME | End: 2025-01-11
Attending: EMERGENCY MEDICINE
Payer: MEDICARE

## 2025-01-11 VITALS
WEIGHT: 99 LBS | SYSTOLIC BLOOD PRESSURE: 151 MMHG | HEART RATE: 87 BPM | TEMPERATURE: 98 F | RESPIRATION RATE: 16 BRPM | OXYGEN SATURATION: 97 % | DIASTOLIC BLOOD PRESSURE: 87 MMHG | HEIGHT: 60 IN | BODY MASS INDEX: 19.44 KG/M2

## 2025-01-11 DIAGNOSIS — K59.00 CONSTIPATION: ICD-10-CM

## 2025-01-11 DIAGNOSIS — M62.830 BACK SPASM: Primary | ICD-10-CM

## 2025-01-11 LAB
BILIRUB UR QL STRIP: NEGATIVE
CLARITY UR REFRACT.AUTO: CLEAR
COLOR UR AUTO: YELLOW
GLUCOSE UR QL STRIP: NEGATIVE
HGB UR QL STRIP: NEGATIVE
KETONES UR QL STRIP: NEGATIVE
LEUKOCYTE ESTERASE UR QL STRIP: NEGATIVE
NITRITE UR QL STRIP: NEGATIVE
PH UR STRIP: 6 [PH] (ref 5–8)
PROT UR QL STRIP: NEGATIVE
SP GR UR STRIP: 1.01 (ref 1–1.03)
URN SPEC COLLECT METH UR: NORMAL

## 2025-01-11 PROCEDURE — 99283 EMERGENCY DEPT VISIT LOW MDM: CPT | Mod: 25

## 2025-01-11 PROCEDURE — 81003 URINALYSIS AUTO W/O SCOPE: CPT | Performed by: NURSE PRACTITIONER

## 2025-01-11 RX ORDER — METHOCARBAMOL 500 MG/1
500 TABLET, FILM COATED ORAL 4 TIMES DAILY
Qty: 40 TABLET | Refills: 0 | Status: SHIPPED | OUTPATIENT
Start: 2025-01-11 | End: 2025-01-21

## 2025-01-11 NOTE — ED TRIAGE NOTES
Triage note:  Chief Complaint   Patient presents with    Back Pain     Lower back pain x2 months. 2 known fractures in back. Constipation x2 days. Pt ambulated to triage.     Review of patient's allergies indicates:   Allergen Reactions    Levaquin [levofloxacin] Hives     Past Medical History:   Diagnosis Date    Anemia, unspecified 08/18/2024    Anxiety     GERD (gastroesophageal reflux disease)

## 2025-01-12 NOTE — ED PROVIDER NOTES
Encounter Date: 1/11/2025       History     Chief Complaint   Patient presents with    Back Pain     Lower back pain x2 months. 2 known fractures in back. Constipation x2 days. Pt ambulated to triage.     73 y/o female with anxiety, GERD, and anemia which presents to the ED with lower back pain with spasms that wrap around her abdomen. Pt has known history of compression fractures. Denies new trauma or injury. Pt is also concerned she is constipated. Duaghter and Son in Law are with her and also have concerns of her back pain and spasms. Pain worsens with movement, especially sitting to standing and vice versa.     The history is provided by the patient.     Review of patient's allergies indicates:   Allergen Reactions    Levaquin [levofloxacin] Hives     Past Medical History:   Diagnosis Date    Anemia, unspecified 08/18/2024    Anxiety     GERD (gastroesophageal reflux disease)      Past Surgical History:   Procedure Laterality Date    CHOLECYSTECTOMY      COLONOSCOPY N/A 11/07/2022    Procedure: COLONOSCOPY;  Surgeon: Lex Morton MD;  Location: [facility] ENDO;  Service: General;  Laterality: N/A;    FRACTURE SURGERY      HEMIARTHROPLASTY OF HIP Right 11/13/2021    Procedure: HEMIARTHROPLASTY, HIP;  Surgeon: Dontae Lopez MD;  Location: [facility] OR;  Service: Orthopedics;  Laterality: Right;    LAPAROSCOPIC CHOLECYSTECTOMY Bilateral 11/30/2022    Procedure: CHOLECYSTECTOMY-LAPAROSCOPIC;  Surgeon: Lex Morton MD;  Location: [facility] OR;  Service: General;  Laterality: Bilateral;     Family History   Problem Relation Name Age of Onset    Heart disease Mother       Social History     Tobacco Use    Smoking status: Never    Smokeless tobacco: Never   Substance Use Topics    Alcohol use: Yes     Comment: a bottle of wine or 6 glasses of beer daily    Drug use: No     Review of Systems   Constitutional:  Negative for fever.   HENT:  Negative for sore throat.    Respiratory:  Negative for shortness of  breath.    Cardiovascular:  Negative for chest pain.   Gastrointestinal:  Negative for nausea.   Genitourinary:  Negative for dysuria.   Musculoskeletal:  Positive for back pain.   Skin:  Negative for rash.   Neurological:  Negative for weakness.   Hematological:  Does not bruise/bleed easily.   All other systems reviewed and are negative.    Physical Exam     Initial Vitals [01/11/25 1424]   BP Pulse Resp Temp SpO2   139/73 104 17 98 °F (36.7 °C) 99 %      MAP       --         Physical Exam     Nursing note and vitals reviewed.  Constitutional: She appears well-developed and well-nourished.   HENT:   Head: Normocephalic and atraumatic.   Eyes: Conjunctivae and EOM are normal. Pupils are equal, round, and reactive to light.   Neck:   Normal range of motion.  Cardiovascular:  Normal rate, regular rhythm, normal heart sounds and intact distal pulses.     Exam reveals no gallop and no friction rub.       No murmur heard.  Pulmonary/Chest: Breath sounds normal. No respiratory distress. She has no wheezes. She has no rhonchi. She has no rales. She exhibits no tenderness.   Abdominal: Abdomen is soft. Bowel sounds are normal. She exhibits no distension and no mass. There is no abdominal tenderness. There is no rebound and no guarding.   Musculoskeletal:         General: Tenderness across the lower lumbar region. No edema. Normal range of motion.      Cervical back: Normal range of motion.      Neurological: She is alert and oriented to person, place, and time. She has normal strength. GCS score is 15. GCS eye subscore is 4. GCS verbal subscore is 5. GCS motor subscore is 6.   Skin: Skin is warm. Capillary refill takes less than 2 seconds. No rash noted. No erythema.   Psychiatric: She has a normal mood and affect.     ED Course   Procedures  Labs Reviewed   URINALYSIS, REFLEX TO URINE CULTURE       Result Value    Specimen UA Urine, Clean Catch      Color, UA Yellow      Appearance, UA Clear      pH, UA 6.0      Specific  Gravity, UA 1.015      Protein, UA Negative      Glucose, UA Negative      Ketones, UA Negative      Bilirubin (UA) Negative      Occult Blood UA Negative      Nitrite, UA Negative      Leukocytes, UA Negative      Narrative:     Specimen Source->Urine          Imaging Results              X-Ray Abdomen AP 1 View (KUB) (Final result)  Result time 01/11/25 16:35:52      Final result by Justin Escoto MD (01/11/25 16:35:52)                   Impression:      As above.      Electronically signed by: Justin Escoto MD  Date:    01/11/2025  Time:    16:35               Narrative:    EXAMINATION:  XR ABDOMEN AP 1 VIEW    CLINICAL HISTORY:  Constipation, unspecified    TECHNIQUE:  AP View(s) of the abdomen was performed.    COMPARISON:  Abdominal radiograph 10/18/2024, CT abdomen and pelvis 12/06/2024    FINDINGS:  Prominent gas-filled loops of small bowel at the midline abdomen measuring up to 2.8 cm in caliber on the right, overall increased gaseous distension from prior, but not yet pathologically dilated more than 3 cm to suggest high-grade obstruction at this time.  Developing ileus or small-bowel obstruction not excluded.  Further evaluation/follow-up as warranted.    No organomegaly significant mass effect.  Cholecystectomy clips and pelvic phleboliths unchanged.  Partially imaged right hip arthroplasty unchanged.  No consolidation at the imaged lung bases noting bibasilar platelike scarring versus atelectasis and prominent senescent costochondral calcifications.  No acute osseous process seen.                                       Medications - No data to display  Medical Decision Making  75 y/o female which presents to the ED with back spasms that radiate to her abdomen and are worse on the left side. Pt denies N/V. KUB negative fro constipation as the patient was concerned she was constipated. Urinalysis negative. Pt given robaxin for her muscle spasms and advised to follow up with her PCP or spine specialist.      Differential Diagnosis: chronic pain, chronic compression fractures, muscle spasms, UTI, pyelonephritis, constipation    Problems Addressed:  Back spasm: acute illness or injury    Amount and/or Complexity of Data Reviewed  Radiology: ordered.    Risk  Prescription drug management.               ED Course as of 01/12/25 1535   Sat Jan 11, 2025   1647 BP(!): 151/87 [AT]   1647 Temp: 97.7 °F (36.5 °C) [AT]   1647 Temp Source: Oral [AT]   1647 Pulse: 87 [AT]   1647 Resp: 16 [AT]   1647 SpO2: 97 % [AT]      ED Course User Index  [AT] Maryana Miller FNP                           Clinical Impression:  Final diagnoses:  [K59.00] Constipation  [M62.830] Back spasm (Primary)          ED Disposition Condition    Discharge Stable          ED Prescriptions       Medication Sig Dispense Start Date End Date Auth. Provider    methocarbamoL (ROBAXIN) 500 MG Tab Take 1 tablet (500 mg total) by mouth 4 (four) times daily. for 10 days 40 tablet 1/11/2025 1/21/2025 Maryana Miller FNP          Follow-up Information       Follow up With Specialties Details Why Contact Info    Jai White MD Family Medicine Schedule an appointment as soon as possible for a visit  As needed [address] [place] [id number] [phone]      [facility] - Emergency Dept Emergency Medicine  If symptoms worsen [address] [facility] [id number] [phone]             Maryana Miller FNP  01/12/25 1535

## 2025-01-13 NOTE — TELEPHONE ENCOUNTER
Spoke with mimi from nuclear med [facility] she will clal and get pt scheduled for the appropriate oders.

## 2025-01-13 NOTE — TELEPHONE ENCOUNTER
Left a detailed message for nuclear med to return my call or call pt son in law to schedule specialized testing. Will follow up on this later this afternoon if I d not receive a call back or do not see if pt is scheduled.

## 2025-01-15 ENCOUNTER — TELEPHONE (OUTPATIENT)
Dept: PAIN MEDICINE | Facility: CLINIC | Age: 75
End: 2025-01-15
Payer: MEDICARE

## 2025-01-15 NOTE — TELEPHONE ENCOUNTER
----- Message from Monica sent at 1/15/2025 11:26 AM CST -----  Regarding: CT scan & extend leave of absense  Contact: pt  Type:  Needs Medical Advice    Who Called: pt    Symptoms (please be specific): oseoporosis in back    Best Call Back Number: [phone]  for pt    Additional Information: pt is supposed to have a ct scan prior to procedure.  Asking when she will get a call to schedule the CT.      Also pt needs doctor to extend her leave of absence with her employer.  Employer is [employer] in [place] ask for [employer] in [place] .   Pt will lose her job if the extension is not received today. Fax to [employer] in [place] fax:  [phone]  or

## 2025-01-15 NOTE — TELEPHONE ENCOUNTER
Spoke with son and advises about leave of absence  , they are going to get Dr White to extend her FMLA leave through work but they do need a note from us stating she is having the CT spect scan on 010/22 and procedures and treatment dates to be determined Is it ok to write and fax a generic note with this information? Thank you.       Made a letter and faxed to number provided

## 2025-01-23 ENCOUNTER — TELEPHONE (OUTPATIENT)
Dept: PAIN MEDICINE | Facility: CLINIC | Age: 75
End: 2025-01-23
Payer: MEDICARE
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
## 2025-01-31 DIAGNOSIS — M80.00XG OSTEOPOROSIS WITH CURRENT PATHOLOGICAL FRACTURE WITH DELAYED HEALING, UNSPECIFIED OSTEOPOROSIS TYPE, SUBSEQUENT ENCOUNTER: Primary | ICD-10-CM

## 2025-01-31 RX ORDER — CALCITONIN SALMON 200 [IU]/.09ML
1 SPRAY, METERED NASAL DAILY
Qty: 3.7 ML | Refills: 0 | Status: SHIPPED | OUTPATIENT
Start: 2025-01-31 | End: 2026-01-31

## 2025-01-31 NOTE — PROGRESS NOTES
Touched base with the patient regarding her compression fractures. Still in a lot of pain. Hard time tolerating the opiates due to constipation. SPECT scan rescheduled for early February. Will send an rx for Miacalcin nasal spray into the pharmacy. 1 spray in 1 nostril per day. Alternate nostrils. Asked them to message me next week to discuss results from this new medication.     Azar Reyes MD  Interventional Pain

## 2025-02-10 ENCOUNTER — HOSPITAL ENCOUNTER (OUTPATIENT)
Dept: RADIOLOGY | Facility: HOSPITAL | Age: 75
Discharge: HOME | End: 2025-02-10
Attending: STUDENT IN AN ORGANIZED HEALTH CARE EDUCATION/TRAINING PROGRAM
Payer: MEDICARE

## 2025-02-10 DIAGNOSIS — S32.050G COMPRESSION FRACTURE OF L5 VERTEBRA WITH DELAYED HEALING, SUBSEQUENT ENCOUNTER: ICD-10-CM

## 2025-02-10 PROCEDURE — 78830 RP LOCLZJ TUM SPECT W/CT 1: CPT | Mod: 26,,, | Performed by: NUCLEAR MEDICINE

## 2025-02-10 PROCEDURE — 78830 RP LOCLZJ TUM SPECT W/CT 1: CPT | Mod: TC

## 2025-02-10 PROCEDURE — A9503 TC99M MEDRONATE: HCPCS | Performed by: STUDENT IN AN ORGANIZED HEALTH CARE EDUCATION/TRAINING PROGRAM

## 2025-02-10 RX ADMIN — TECHNETIUM TC 99M MEDRONATE 22.8 MILLICURIE: 20 INJECTION, POWDER, LYOPHILIZED, FOR SOLUTION INTRAVENOUS at 11:02

## 2025-02-12 ENCOUNTER — OFFICE VISIT (OUTPATIENT)
Dept: PAIN MEDICINE | Facility: CLINIC | Age: 75
End: 2025-02-12
Payer: MEDICARE

## 2025-02-12 VITALS — HEART RATE: 97 BPM | SYSTOLIC BLOOD PRESSURE: 145 MMHG | DIASTOLIC BLOOD PRESSURE: 78 MMHG

## 2025-02-12 DIAGNOSIS — T40.2X5A OPIOID-INDUCED CONSTIPATION: ICD-10-CM

## 2025-02-12 DIAGNOSIS — M47.816 SPONDYLOSIS WITHOUT MYELOPATHY OR RADICULOPATHY, LUMBAR REGION: ICD-10-CM

## 2025-02-12 DIAGNOSIS — M81.0 OSTEOPOROSIS, UNSPECIFIED OSTEOPOROSIS TYPE, UNSPECIFIED PATHOLOGICAL FRACTURE PRESENCE: Primary | ICD-10-CM

## 2025-02-12 DIAGNOSIS — K59.03 OPIOID-INDUCED CONSTIPATION: ICD-10-CM

## 2025-02-12 DIAGNOSIS — S32.000S COMPRESSION FRACTURE OF LUMBAR VERTEBRA, UNSPECIFIED LUMBAR VERTEBRAL LEVEL, SEQUELA: ICD-10-CM

## 2025-02-12 PROCEDURE — 99999 PR PBB SHADOW E&M-EST. PATIENT-LVL II: CPT | Mod: PBBFAC,,, | Performed by: STUDENT IN AN ORGANIZED HEALTH CARE EDUCATION/TRAINING PROGRAM

## 2025-02-12 PROCEDURE — 99212 OFFICE O/P EST SF 10 MIN: CPT | Mod: PBBFAC,PN | Performed by: STUDENT IN AN ORGANIZED HEALTH CARE EDUCATION/TRAINING PROGRAM

## 2025-02-12 PROCEDURE — 99213 OFFICE O/P EST LOW 20 MIN: CPT | Mod: S$PBB,,, | Performed by: STUDENT IN AN ORGANIZED HEALTH CARE EDUCATION/TRAINING PROGRAM

## 2025-02-12 NOTE — H&P (VIEW-ONLY)
[facility]    Referred by:   No ref. provider found    PCP:   Jai White MD    CHIEF COMPLAINT:   Low back pain    HISTORY OF PRESENT ILLNESS:   Heri Jansen presents for follow up of lower back pain.  She was last seen in early December at which time we ordered a SPECT CT scan of the lumbar spine to investigate which if any of the compression fractures were metabolically active.  After difficulty scheduling and delays due to this knowing whether, she finally had this study done last week.  She is here to review results of the study and talk about our plan going forward.  She has not yet picked up the Fortical nasal spray ordered last week.       PAIN SCORES:  4-8/10    Therapy:  N/a    Pertinent Medications:  Norco 5/325  Xanax 0.5 b.i.d. p.r.n.  All other medications reviewed in the patient's chart.     Pain Intervention History:  None    Review of patient's allergies indicates:   Allergen Reactions    Levaquin [levofloxacin] Hives     Past Medical History:   Diagnosis Date    Anemia, unspecified 08/18/2024    Anxiety     GERD (gastroesophageal reflux disease)      Past Surgical History:   Procedure Laterality Date    CHOLECYSTECTOMY      COLONOSCOPY N/A 11/07/2022    Procedure: COLONOSCOPY;  Surgeon: Lex Morton MD;  Location: [facility] ENDO;  Service: General;  Laterality: N/A;    FRACTURE SURGERY      HEMIARTHROPLASTY OF HIP Right 11/13/2021    Procedure: HEMIARTHROPLASTY, HIP;  Surgeon: Dontae Lopez MD;  Location: [facility] OR;  Service: Orthopedics;  Laterality: Right;    LAPAROSCOPIC CHOLECYSTECTOMY Bilateral 11/30/2022    Procedure: CHOLECYSTECTOMY-LAPAROSCOPIC;  Surgeon: Lex Morton MD;  Location: [facility] OR;  Service: General;  Laterality: Bilateral;       Social History:  Social History     Tobacco Use    Smoking status: Never    Smokeless tobacco: Never   Substance Use Topics    Alcohol use: Yes     Comment: a bottle of wine or 6 glasses of beer daily    Drug  use: No        Family history reviewed in the patient's chart.     PHYSICAL EXAMINATION  VITALS:   Vitals:    02/12/25 1314   BP: (!) 145/78   Pulse: 97   PainSc:   4   PainLoc: Back       GENERAL: Calm, cooperative, pleasant  CHEST: No increased work of breathing  SKIN: Intact    MSK/NEURO:  Strength, sensation to light touch, and reflexes are within normal limits in bilateral lower extremities   There is tenderness to palpation in the lumbar region.  It is difficult to localize the tenderness within this region as the patient's body habitus is so small.  There is pain with lumbar flexion.    LABS:  Mild anemia on October  Renal and hepatic markers normal    IMAGING:    CT lumbar spine December 2024  FINDINGS:  Bones: Diffuse bony demineralization.  Multiple stable compression fracture deformities redemonstrated including moderate/severe height loss anteriorly at L1 and L5 with more moderate height loss of L4 and mild height loss of L2 and L3.  Partially imaged T12 fracture is grossly unchanged.  No acute displaced fractures or bony destructive process.    NM Fusion Spect CT  Impression:  No significant radiotracer uptake associated with the compression fractures in the lumbar spine.  Linear increased uptake noted at the T10-T11 level related to active compression fracture.  Degenerative type uptake at the bilateral L5-S1 facet joints.  Focal uptake in the left lateral 8th rib, correlation for trauma recommended.    ASSESSMENT:   74 y.o. year old female with axial lumbosacral pain most likely related to the lower lumbar facet joints.  The compression fractures in her lumbar spine are likely what instigated the spondylitic pain due to changes in the biomechanical loading of the joints.    Encounter Diagnoses   Name Primary?    Osteoporosis, unspecified osteoporosis type, unspecified pathological fracture presence Yes    Spondylosis without myelopathy or radiculopathy, lumbar region     Compression fracture of lumbar  vertebra, unspecified lumbar vertebral level, sequela     Opioid-induced constipation        PLAN:  Schedule bilateral L3, 4, 5 medial branch blocks.  Discussed PT but she will not be able to tolerate/participate meaningfully in her current state to the severity of pain.  Keep in mind this has been going on since October/November without reprieve.  Encouraged her to revisit osteoporosis treatment with Dr. White her primary care physician.  Follow up TBD by response to the above      Azar Reyes MD  This note was completed with dictation software and grammatical/syntax errors may exist.

## 2025-02-12 NOTE — PROGRESS NOTES
[facility]ventional [facility]    Referred by:   No ref. provider found    PCP:   Jai White MD    CHIEF COMPLAINT:   Low back pain    HISTORY OF PRESENT ILLNESS:   Heri Jansen presents for follow up of lower back pain.  She was last seen in early December at which time we ordered a SPECT CT scan of the lumbar spine to investigate which if any of the compression fractures were metabolically active.  After difficulty scheduling and delays due to this knowing whether, she finally had this study done last week.  She is here to review results of the study and talk about our plan going forward.  She has not yet picked up the Fortical nasal spray ordered last week.       PAIN SCORES:  4-8/10    Therapy:  N/a    Pertinent Medications:  Norco 5/325  Xanax 0.5 b.i.d. p.r.n.  All other medications reviewed in the patient's chart.     Pain Intervention History:  None    Review of patient's allergies indicates:   Allergen Reactions    Levaquin [levofloxacin] Hives     Past Medical History:   Diagnosis Date    Anemia, unspecified 08/18/2024    Anxiety     GERD (gastroesophageal reflux disease)      Past Surgical History:   Procedure Laterality Date    CHOLECYSTECTOMY      COLONOSCOPY N/A 11/07/2022    Procedure: COLONOSCOPY;  Surgeon: Lex Morton MD;  Location: [facility] ENDO;  Service: General;  Laterality: N/A;    FRACTURE SURGERY      HEMIARTHROPLASTY OF HIP Right 11/13/2021    Procedure: HEMIARTHROPLASTY, HIP;  Surgeon: Dontae Lopez MD;  Location: [facility] OR;  Service: Orthopedics;  Laterality: Right;    LAPAROSCOPIC CHOLECYSTECTOMY Bilateral 11/30/2022    Procedure: CHOLECYSTECTOMY-LAPAROSCOPIC;  Surgeon: Lex Morton MD;  Location: [facility] OR;  Service: General;  Laterality: Bilateral;       Social History:  Social History     Tobacco Use    Smoking status: Never    Smokeless tobacco: Never   Substance Use Topics    Alcohol use: Yes     Comment: a bottle of wine or 6 glasses of beer daily    Drug  use: No        Family history reviewed in the patient's chart.     PHYSICAL EXAMINATION  VITALS:   Vitals:    02/12/25 1314   BP: (!) 145/78   Pulse: 97   PainSc:   4   PainLoc: Back       GENERAL: Calm, cooperative, pleasant  CHEST: No increased work of breathing  SKIN: Intact    MSK/NEURO:  Strength, sensation to light touch, and reflexes are within normal limits in bilateral lower extremities   There is tenderness to palpation in the lumbar region.  It is difficult to localize the tenderness within this region as the patient's body habitus is so small.  There is pain with lumbar flexion.    LABS:  Mild anemia on October  Renal and hepatic markers normal    IMAGING:    CT lumbar spine December 2024  FINDINGS:  Bones: Diffuse bony demineralization.  Multiple stable compression fracture deformities redemonstrated including moderate/severe height loss anteriorly at L1 and L5 with more moderate height loss of L4 and mild height loss of L2 and L3.  Partially imaged T12 fracture is grossly unchanged.  No acute displaced fractures or bony destructive process.    NM Fusion Spect CT  Impression:  No significant radiotracer uptake associated with the compression fractures in the lumbar spine.  Linear increased uptake noted at the T10-T11 level related to active compression fracture.  Degenerative type uptake at the bilateral L5-S1 facet joints.  Focal uptake in the left lateral 8th rib, correlation for trauma recommended.    ASSESSMENT:   74 y.o. year old female with axial lumbosacral pain most likely related to the lower lumbar facet joints.  The compression fractures in her lumbar spine are likely what instigated the spondylitic pain due to changes in the biomechanical loading of the joints.    Encounter Diagnoses   Name Primary?    Osteoporosis, unspecified osteoporosis type, unspecified pathological fracture presence Yes    Spondylosis without myelopathy or radiculopathy, lumbar region     Compression fracture of lumbar  vertebra, unspecified lumbar vertebral level, sequela     Opioid-induced constipation        PLAN:  Schedule bilateral L3, 4, 5 medial branch blocks.  Discussed PT but she will not be able to tolerate/participate meaningfully in her current state to the severity of pain.  Keep in mind this has been going on since October/November without reprieve.  Encouraged her to revisit osteoporosis treatment with Dr. White her primary care physician.  Follow up TBD by response to the above      Azar Reyes MD  This note was completed with dictation software and grammatical/syntax errors may exist.

## 2025-02-14 ENCOUNTER — TELEPHONE (OUTPATIENT)
Dept: PAIN MEDICINE | Facility: CLINIC | Age: 75
End: 2025-02-14
Payer: MEDICARE

## 2025-02-14 NOTE — TELEPHONE ENCOUNTER
----- Message from Catherine sent at 2/14/2025 11:01 AM CST -----   Type:  Needs Medical Advice    Who Called:  PT    Would the patient rather a call back or a response via [facility]?  call  Best Call Back Number: [phone]        Additional Information:  states she waiting for lead of absence paper work to be faxed or received  Please call back to advise. Thank you!

## 2025-02-14 NOTE — TELEPHONE ENCOUNTER
Spoke with this patient and she said her doctor is supposed to fill out leave of absence paperwork for her and I informed her that it still has not been faxed over to our office as of yet

## 2025-02-17 ENCOUNTER — TELEPHONE (OUTPATIENT)
Dept: PAIN MEDICINE | Facility: CLINIC | Age: 75
End: 2025-02-17
Payer: MEDICARE

## 2025-02-17 NOTE — TELEPHONE ENCOUNTER
----- Message from Azar Reyes MD sent at 2/17/2025  1:02 PM CST -----  Regarding: RE: Call  I have not received anything  ----- Message -----  From: Chrissy Eubanks LPN  Sent: 2/17/2025  12:53 PM CST  To: Azar Reyes MD; [place]#  Subject: FW: Call                                           ----- Message -----  From: Maru Figueredo  Sent: 2/17/2025  12:50 PM CST  To: Eric Askew Staff  Subject: Call                                             Type:  Needs Medical AdviceWho Called: PtWould the patient rather a call back or a response via [facility]? [facility] Call Back Number: [phone] or [phone] Information: Pt would like an update on form that was to be sent to daniel. Pt this is now 2wks she is trying to to an update. Thanks

## 2025-02-18 ENCOUNTER — PATIENT MESSAGE (OUTPATIENT)
Dept: PAIN MEDICINE | Facility: CLINIC | Age: 75
End: 2025-02-18
Payer: MEDICARE

## 2025-02-20 ENCOUNTER — TELEPHONE (OUTPATIENT)
Dept: PAIN MEDICINE | Facility: CLINIC | Age: 75
End: 2025-02-20
Payer: MEDICARE

## 2025-02-20 DIAGNOSIS — M47.816 SPONDYLOSIS WITHOUT MYELOPATHY OR RADICULOPATHY, LUMBAR REGION: Primary | ICD-10-CM

## 2025-02-20 NOTE — TELEPHONE ENCOUNTER
Level of Service:63493     CT OFFICE/OUTPT VISIT, EST, LEVL III, 20-29 MIN      Types of orders made on 02/20/2025: Procedure Request      Order Date:2/20/2025   Ordering User:AZAR ORTIZ [[id number]]   Encounter Provider:Azar Ortiz MD [[id number]]   Authorizing Provider: Azar Ortiz MD [[id number]]   Department:[facility] PAIN MANAGEMENT[[id number]]      Common Order Information   Procedure -> Medial Branch Block (Specify level and laterality) Cmt: L3,4,5             bilateral (L4/5 and L5/S1 joints)      Order Specific Information   Order: Procedure Request Order for Pain Management [Custom: [id number]]  Order #:          [id number]: 1 FUTURE     Priority: Routine  Class: Clinic Performed     Future Order Information       Expires on:02/20/2026            Expected by:02/20/2025                   Associated Diagnoses       M47.816 Spondylosis without myelopathy or radiculopathy, lumbar region       Facility Name: -> [facility]              Priority: Routine  Class: Clinic Performed     Future Order Information       Expires on:02/20/2026            Expected by:02/20/2025                   Associated Diagnoses       M47.816 Spondylosis without myelopathy or radiculopathy, lumbar region       Procedure -> Medial Branch Block (Specify level and laterality) Cmt: L3,4,5                 bilateral (L4/5 and L5/S1 joints)          Facility Name: -> [facility]

## 2025-02-26 ENCOUNTER — TELEPHONE (OUTPATIENT)
Dept: PAIN MEDICINE | Facility: CLINIC | Age: 75
End: 2025-02-26
Payer: MEDICARE

## 2025-02-26 NOTE — TELEPHONE ENCOUNTER
----- Message from Azar Reyes MD sent at 2/25/2025  4:12 PM CST -----  Contact: Patient  Yes the expected duration of her absence was on those forms  ----- Message -----  From: Chrissy Eubanks LPN  Sent: 2/25/2025   2:53 PM CST  To: Azar Reyes MD    Were the forms you had filled out with  out of work dates? Please advise.  ----- Message -----  From: Renetta Zheng  Sent: 2/25/2025   2:48 PM CST  To: Eric Askew Staff    Type:  Needs Medical AdviceWho Called: PatientWould the patient rather a call back or a response via [facility]? [facility] Call Back Number: [phone] Information: Patient is in need of a form stating tavera long she will be out of work for her employer. Please call to advise

## 2025-02-28 ENCOUNTER — TELEPHONE (OUTPATIENT)
Dept: PAIN MEDICINE | Facility: CLINIC | Age: 75
End: 2025-02-28
Payer: MEDICARE

## 2025-02-28 NOTE — TELEPHONE ENCOUNTER
Attempted to reach out to this patient trying to return their call and not getting an answer left a message letting her know the office did return her call

## 2025-03-07 ENCOUNTER — HOSPITAL ENCOUNTER (OUTPATIENT)
Facility: HOSPITAL | Age: 75
Discharge: HOME | End: 2025-03-07
Attending: STUDENT IN AN ORGANIZED HEALTH CARE EDUCATION/TRAINING PROGRAM | Admitting: STUDENT IN AN ORGANIZED HEALTH CARE EDUCATION/TRAINING PROGRAM
Payer: MEDICARE

## 2025-03-07 DIAGNOSIS — M47.896 OTHER SPONDYLOSIS, LUMBAR REGION: ICD-10-CM

## 2025-03-07 PROCEDURE — 63600175 PHARM REV CODE 636 W HCPCS: Performed by: STUDENT IN AN ORGANIZED HEALTH CARE EDUCATION/TRAINING PROGRAM

## 2025-03-07 PROCEDURE — 99152 MOD SED SAME PHYS/QHP 5/>YRS: CPT | Performed by: STUDENT IN AN ORGANIZED HEALTH CARE EDUCATION/TRAINING PROGRAM

## 2025-03-07 PROCEDURE — 99152 MOD SED SAME PHYS/QHP 5/>YRS: CPT | Mod: ,,, | Performed by: STUDENT IN AN ORGANIZED HEALTH CARE EDUCATION/TRAINING PROGRAM

## 2025-03-07 PROCEDURE — 64494 INJ PARAVERT F JNT L/S 2 LEV: CPT | Mod: 50 | Performed by: STUDENT IN AN ORGANIZED HEALTH CARE EDUCATION/TRAINING PROGRAM

## 2025-03-07 PROCEDURE — 25500020 PHARM REV CODE 255: Performed by: STUDENT IN AN ORGANIZED HEALTH CARE EDUCATION/TRAINING PROGRAM

## 2025-03-07 PROCEDURE — 64493 INJ PARAVERT F JNT L/S 1 LEV: CPT | Mod: 50,KX,, | Performed by: STUDENT IN AN ORGANIZED HEALTH CARE EDUCATION/TRAINING PROGRAM

## 2025-03-07 PROCEDURE — 64494 INJ PARAVERT F JNT L/S 2 LEV: CPT | Mod: 50,KX,, | Performed by: STUDENT IN AN ORGANIZED HEALTH CARE EDUCATION/TRAINING PROGRAM

## 2025-03-07 PROCEDURE — 64493 INJ PARAVERT F JNT L/S 1 LEV: CPT | Mod: 50 | Performed by: STUDENT IN AN ORGANIZED HEALTH CARE EDUCATION/TRAINING PROGRAM

## 2025-03-07 RX ORDER — BUPIVACAINE HYDROCHLORIDE 2.5 MG/ML
INJECTION, SOLUTION EPIDURAL; INFILTRATION; INTRACAUDAL; PERINEURAL
Status: DISCONTINUED | OUTPATIENT
Start: 2025-03-07 | End: 2025-03-07 | Stop reason: HOSPADM

## 2025-03-07 RX ORDER — LIDOCAINE HYDROCHLORIDE 20 MG/ML
INJECTION, SOLUTION EPIDURAL; INFILTRATION; INTRACAUDAL; PERINEURAL
Status: DISCONTINUED | OUTPATIENT
Start: 2025-03-07 | End: 2025-03-07 | Stop reason: HOSPADM

## 2025-03-07 RX ORDER — SODIUM CHLORIDE, SODIUM LACTATE, POTASSIUM CHLORIDE, CALCIUM CHLORIDE 600; 310; 30; 20 MG/100ML; MG/100ML; MG/100ML; MG/100ML
INJECTION, SOLUTION INTRAVENOUS CONTINUOUS
Status: DISCONTINUED | OUTPATIENT
Start: 2025-03-07 | End: 2025-03-07 | Stop reason: HOSPADM

## 2025-03-07 RX ORDER — MIDAZOLAM HYDROCHLORIDE 1 MG/ML
INJECTION INTRAMUSCULAR; INTRAVENOUS
Status: DISCONTINUED | OUTPATIENT
Start: 2025-03-07 | End: 2025-03-07 | Stop reason: HOSPADM

## 2025-03-07 RX ORDER — LIDOCAINE HYDROCHLORIDE 10 MG/ML
1 INJECTION, SOLUTION EPIDURAL; INFILTRATION; INTRACAUDAL; PERINEURAL ONCE
Status: COMPLETED | OUTPATIENT
Start: 2025-03-07 | End: 2025-03-07

## 2025-03-07 RX ADMIN — LIDOCAINE HYDROCHLORIDE 10 MG: 10 INJECTION, SOLUTION EPIDURAL; INFILTRATION; INTRACAUDAL at 09:03

## 2025-03-07 NOTE — OP NOTE
Diagnostic Lumbar Medial Branch Block Under Fluoroscopy    The procedure, risks, benefits, and options were discussed with the patient. There are no contraindications to the procedure. The patent expressed understanding and agreed to the procedure. Informed written consent was obtained prior to the start of the procedure and can be found in the patient's chart.    PATIENT NAME: Heri Jansen   MRN: [id number]     DATE OF PROCEDURE: 03/07/2025                                           PROCEDURE:  Diagnostic Bilateral L3, L4, and L5 Lumbar Medial Branch Block under Fluoroscopy    PRE-OP DIAGNOSIS: Spondylosis without myelopathy or radiculopathy, lumbar region [M47.816] Lumbar spondylosis [M47.816]    POST-OP DIAGNOSIS: Same    PHYSICIAN: Azar Reyes MD    ASSISTANTS: none    MEDICATIONS INJECTED:  Bupivicaine 0.25%    SEDATION: Versed 1mg and Fentanyl 0mcg                                                                                                                                                                                     Conscious sedation ordered by M.D. Patient re-evaluation prior to administration of conscious sedation. No changes noted in patient's status from initial evaluation. The patient's vital signs were monitored by RN and patient remained hemodynamically stable throughout the procedure.    Event Time In   Sedation Start 0950   Sedation End 1002       ESTIMATED BLOOD LOSS:  None    COMPLICATIONS:  None.    INTERVAL HISTORY: Patient has clinical and imaging findings suggestive of facet mediated pain.    TECHNIQUE: Time-out was performed to identify the patient and procedure to be performed. With the patient laying in a prone position, the surgical area was prepped and draped in the usual sterile fashion using ChloraPrep and fenestrated drape. The levels were determined under fluoroscopic guidance. A 25 gauge, 2 inch needle was introduced into the medial branch nerves at the junctions of  the superior articular process and the transverse processes of the targeted sites using AP, lateral and/or contralateral oblique fluoroscopic imaging. After negative aspiration for blood or CSF was confirmed, 0.5 mL of the anesthetic listed above was then slowly injected at each site. The needles were removed and bleeding was nil. A sterile dressing was applied. No specimens collected. The patient tolerated the procedure well.    The patient was monitored after the procedure in the recovery area. They were given post-procedure and discharge instructions to follow at home. The patient was discharged in a stable condition.    Azar Reyes MD

## 2025-03-07 NOTE — DISCHARGE SUMMARY
[facility]  Discharge Note  Short Stay     Admit Date: 3/7/2025    Discharge Date: 3/7/2025     Attending Physician: Azar Reyes MD    Diagnoses:  Lumbar spondylosis    Discharged Condition: Good     Hospital Course: Patient was admitted for an outpatient interventional pain management procedure and tolerated the procedure well with no complications.     Final Diagnoses: Same as principal problem.     Disposition: Home or Self Care     Follow up/Patient Instructions:   Follow-up in 4 weeks unless otherwise instructed. May return sooner as needed.       Reconciled Medications:     Medication List        CONTINUE taking these medications      ALPRAZolam 0.5 MG tablet  Commonly known as: XANAX  Take 0.5 mg by mouth every 12 (twelve) hours as needed for Anxiety.     azelastine 137 mcg (0.1 %) nasal spray  Commonly known as: ASTELIN  2 sprays by Nasal route 2 (two) times daily.     bisacodyL 5 mg EC tablet  Commonly known as: DULCOLAX  Take 1 tablet (5 mg total) by mouth daily as needed for Constipation.     calcitonin (salmon) 200 unit/actuation nasal spray  Commonly known as: FORTICAL  1 spray by Nasal route once daily. Alternate nostrils.     HYDROcodone-acetaminophen  mg per tablet  Commonly known as: NORCO  Take 1 tablet by mouth every 6 (six) hours as needed for Pain.     lactulose 20 gram/30 mL Soln  Commonly known as: CHRONULAC  Take 30 mLs (20 g total) by mouth 2 (two) times daily as needed (constipation).     mometasone 50 mcg/actuation nasal spray  Commonly known as: NASONEX  2 sprays by Nasal route once daily.     omeprazole 40 MG capsule  Commonly known as: PRILOSEC  Take 40 mg by mouth 2 (two) times daily.     SENNA 8.6 mg Cap  Generic drug: sennosides  Take 1 tablet by mouth daily as needed (constipation).     VITAMIN D3 125 mcg (5,000 unit) Tab  Generic drug: cholecalciferol (vitamin D3)  Take 5,000 Units by mouth once daily.             Discharge Procedure Orders (must include  Diet, Follow-up, Activity)   Ice to affected area   Order Comments: 20 minutes of ice or until area numb to the touch if area is sore 2-3 times per day as needed     No driving until:   Order Comments: Until following day     No dressing needed     Notify your health care provider if you experience any of the following:  temperature >100.4     Notify your health care provider if you experience any of the following:  persistent nausea and vomiting or diarrhea     Notify your health care provider if you experience any of the following:  severe uncontrolled pain     Notify your health care provider if you experience any of the following:  redness, tenderness, or signs of infection (pain, swelling, redness, odor or green/yellow discharge around incision site)     Notify your health care provider if you experience any of the following:  difficulty breathing or increased cough     Notify your health care provider if you experience any of the following:  severe persistent headache     Notify your health care provider if you experience any of the following:  worsening rash     Notify your health care provider if you experience any of the following:  persistent dizziness, light-headedness, or visual disturbances     Notify your health care provider if you experience any of the following:  increased confusion or weakness     Shower on day dressing removed (No bath)       Azar Reyes MD  Interventional Pain Medicine / Physical Medicine & Rehabilitation

## 2025-03-07 NOTE — INTERVAL H&P NOTE
The patient has been examined and the H&P has been reviewed:    I concur with the findings and no changes have occurred since H&P was written.    Anesthesia/Surgery risks, benefits and alternative options discussed and understood by patient/family.  RRR CTABL        There are no hospital problems to display for this patient.

## 2025-03-07 NOTE — PLAN OF CARE
Pt awake, alert and oriented. Denies pain, denies nausea, vital signs stable and at baseline. Able to ambulate with stable gait to wheelchair. Pt and son in law received discharge instructions verbally and via handout, verbalized understanding. Son in law driving pt home.

## 2025-03-11 VITALS
RESPIRATION RATE: 16 BRPM | HEART RATE: 80 BPM | WEIGHT: 99 LBS | DIASTOLIC BLOOD PRESSURE: 66 MMHG | HEIGHT: 60 IN | OXYGEN SATURATION: 98 % | TEMPERATURE: 98 F | BODY MASS INDEX: 19.44 KG/M2 | SYSTOLIC BLOOD PRESSURE: 129 MMHG

## 2025-03-13 ENCOUNTER — TELEPHONE (OUTPATIENT)
Dept: PAIN MEDICINE | Facility: CLINIC | Age: 75
End: 2025-03-13
Payer: MEDICARE

## 2025-03-13 DIAGNOSIS — M47.816 SPONDYLOSIS WITHOUT MYELOPATHY OR RADICULOPATHY, LUMBAR REGION: Primary | ICD-10-CM

## 2025-03-13 NOTE — TELEPHONE ENCOUNTER
"Pt returned call regarding post-procedure questionnaire , pt stated she spoke to someone from Dr. Reyes office earlier in the day but did not understand what the "percentages" were about and what she was being asked during previous phone call. This nurse explained that previous caller was trying to go over post-procedure questionnaire and ask the pt the questions on the questionnaire. Pt responded she didn't know that was what she was being asked during previous phone call. This nurse went through questionnaire again with pt, those answers can be found below.     Regarding your Lumbar Medial Branch Block , For Date of Service:  3/7/25    Please answer the following questions:    1. What percentage of pain relief did you receive following the block, from 0-100%? 100%    2. How many hours did pain relief last following the block?  2+ hours    3. During this time please describe in detail the activities you were able to do? Patient stated she was able to walk around the grocery store after procedure.    4. Pain score from 0-10 pre procedure - 5     5. Pain score from 0-10 during block  - 0    6. Return to baseline pain score of 0-10 once the block wore off -  5     "

## 2025-04-04 ENCOUNTER — HOSPITAL ENCOUNTER (OUTPATIENT)
Facility: HOSPITAL | Age: 75
Discharge: HOME | End: 2025-04-04
Attending: STUDENT IN AN ORGANIZED HEALTH CARE EDUCATION/TRAINING PROGRAM | Admitting: STUDENT IN AN ORGANIZED HEALTH CARE EDUCATION/TRAINING PROGRAM
Payer: MEDICARE

## 2025-04-04 DIAGNOSIS — M54.16 LUMBAR RADICULITIS: ICD-10-CM

## 2025-04-04 PROCEDURE — 63600175 PHARM REV CODE 636 W HCPCS: Performed by: STUDENT IN AN ORGANIZED HEALTH CARE EDUCATION/TRAINING PROGRAM

## 2025-04-04 PROCEDURE — 99152 MOD SED SAME PHYS/QHP 5/>YRS: CPT | Performed by: STUDENT IN AN ORGANIZED HEALTH CARE EDUCATION/TRAINING PROGRAM

## 2025-04-04 PROCEDURE — 64493 INJ PARAVERT F JNT L/S 1 LEV: CPT | Mod: 50,KX,, | Performed by: STUDENT IN AN ORGANIZED HEALTH CARE EDUCATION/TRAINING PROGRAM

## 2025-04-04 PROCEDURE — 64494 INJ PARAVERT F JNT L/S 2 LEV: CPT | Mod: 50,KX,, | Performed by: STUDENT IN AN ORGANIZED HEALTH CARE EDUCATION/TRAINING PROGRAM

## 2025-04-04 PROCEDURE — 64494 INJ PARAVERT F JNT L/S 2 LEV: CPT | Mod: 50 | Performed by: STUDENT IN AN ORGANIZED HEALTH CARE EDUCATION/TRAINING PROGRAM

## 2025-04-04 PROCEDURE — 25500020 PHARM REV CODE 255: Performed by: STUDENT IN AN ORGANIZED HEALTH CARE EDUCATION/TRAINING PROGRAM

## 2025-04-04 PROCEDURE — 64493 INJ PARAVERT F JNT L/S 1 LEV: CPT | Mod: 50 | Performed by: STUDENT IN AN ORGANIZED HEALTH CARE EDUCATION/TRAINING PROGRAM

## 2025-04-04 RX ORDER — BUPIVACAINE HYDROCHLORIDE 2.5 MG/ML
INJECTION, SOLUTION EPIDURAL; INFILTRATION; INTRACAUDAL; PERINEURAL
Status: DISCONTINUED | OUTPATIENT
Start: 2025-04-04 | End: 2025-04-04 | Stop reason: HOSPADM

## 2025-04-04 RX ORDER — LIDOCAINE HYDROCHLORIDE 10 MG/ML
1 INJECTION, SOLUTION EPIDURAL; INFILTRATION; INTRACAUDAL; PERINEURAL ONCE
Status: COMPLETED | OUTPATIENT
Start: 2025-04-04 | End: 2025-04-04

## 2025-04-04 RX ORDER — SODIUM CHLORIDE, SODIUM LACTATE, POTASSIUM CHLORIDE, CALCIUM CHLORIDE 600; 310; 30; 20 MG/100ML; MG/100ML; MG/100ML; MG/100ML
INJECTION, SOLUTION INTRAVENOUS CONTINUOUS
Status: DISCONTINUED | OUTPATIENT
Start: 2025-04-04 | End: 2025-04-04 | Stop reason: HOSPADM

## 2025-04-04 RX ORDER — MIDAZOLAM HYDROCHLORIDE 1 MG/ML
INJECTION INTRAMUSCULAR; INTRAVENOUS
Status: DISCONTINUED | OUTPATIENT
Start: 2025-04-04 | End: 2025-04-04 | Stop reason: HOSPADM

## 2025-04-04 RX ORDER — LIDOCAINE HYDROCHLORIDE 20 MG/ML
INJECTION, SOLUTION EPIDURAL; INFILTRATION; INTRACAUDAL; PERINEURAL
Status: DISCONTINUED | OUTPATIENT
Start: 2025-04-04 | End: 2025-04-04 | Stop reason: HOSPADM

## 2025-04-04 RX ADMIN — LIDOCAINE HYDROCHLORIDE 0.2 MG: 10 INJECTION, SOLUTION EPIDURAL; INFILTRATION; INTRACAUDAL at 12:04

## 2025-04-04 NOTE — PLAN OF CARE
VSS no c/o pain nausea or sob.  Discharge instructions reviewed with pt.  Questions answered and pt verbalizes understanding.

## 2025-04-04 NOTE — DISCHARGE SUMMARY
[facility]  Discharge Note  Short Stay     Admit Date: 4/4/2025    Discharge Date: 4/4/2025     Attending Physician: Azar Reyes MD    Diagnoses:  Lumbar spondylosis    Discharged Condition: Good     Hospital Course: Patient was admitted for an outpatient interventional pain management procedure and tolerated the procedure well with no complications.     Final Diagnoses: Same as principal problem.     Disposition: Home or Self Care     Follow up/Patient Instructions:   Follow-up in 4 weeks unless otherwise instructed. May return sooner as needed.       Reconciled Medications:     Medication List        CONTINUE taking these medications      ALPRAZolam 0.5 MG tablet  Commonly known as: XANAX  Take 0.5 mg by mouth every 12 (twelve) hours as needed for Anxiety.     azelastine 137 mcg (0.1 %) nasal spray  Commonly known as: ASTELIN  2 sprays by Nasal route 2 (two) times daily.     bisacodyL 5 mg EC tablet  Commonly known as: DULCOLAX  Take 1 tablet (5 mg total) by mouth daily as needed for Constipation.     calcitonin (salmon) 200 unit/actuation nasal spray  Commonly known as: FORTICAL  1 spray by Nasal route once daily. Alternate nostrils.     HYDROcodone-acetaminophen  mg per tablet  Commonly known as: NORCO  Take 1 tablet by mouth every 6 (six) hours as needed for Pain.     lactulose 20 gram/30 mL Soln  Commonly known as: CHRONULAC  Take 30 mLs (20 g total) by mouth 2 (two) times daily as needed (constipation).     mometasone 50 mcg/actuation nasal spray  Commonly known as: NASONEX  2 sprays by Nasal route once daily.     omeprazole 40 MG capsule  Commonly known as: PRILOSEC  Take 40 mg by mouth 2 (two) times daily.     SENNA 8.6 mg Cap  Generic drug: sennosides  Take 1 tablet by mouth daily as needed (constipation).     VITAMIN D3 125 mcg (5,000 unit) Tab  Generic drug: cholecalciferol (vitamin D3)  Take 5,000 Units by mouth once daily.             Discharge Procedure Orders (must include  Diet, Follow-up, Activity)   Ice to affected area   Order Comments: 20 minutes of ice or until area numb to the touch if area is sore 2-3 times per day as needed     No driving until:   Order Comments: Until following day     No dressing needed     Notify your health care provider if you experience any of the following:  temperature >100.4     Notify your health care provider if you experience any of the following:  persistent nausea and vomiting or diarrhea     Notify your health care provider if you experience any of the following:  severe uncontrolled pain     Notify your health care provider if you experience any of the following:  redness, tenderness, or signs of infection (pain, swelling, redness, odor or green/yellow discharge around incision site)     Notify your health care provider if you experience any of the following:  difficulty breathing or increased cough     Notify your health care provider if you experience any of the following:  severe persistent headache     Notify your health care provider if you experience any of the following:  worsening rash     Notify your health care provider if you experience any of the following:  persistent dizziness, light-headedness, or visual disturbances     Notify your health care provider if you experience any of the following:  increased confusion or weakness     Shower on day dressing removed (No bath)       Azar Reyes MD  Interventional Pain Medicine / Physical Medicine & Rehabilitation

## 2025-04-04 NOTE — H&P (VIEW-ONLY)
HPI  Patient presenting for Procedure(s) (LRB):  Block-nerve-medial branch-lumbar l3,4,5 MBB #2 (Bilateral)     Patient on Anti-coagulation No    No health changes since previous encounter. No changes in pain since procedure was scheduled at previous visit. Denies any fevers or infections. Denies any issues with clotting or bleeding.     Facility-Administered Medications as of 4/4/2025   Medication Dose Route Frequency Provider Last Rate Last Admin    lactated ringers infusion   Intravenous Continuous Azar Reyes MD        LIDOcaine (PF) 10 mg/ml (1%) injection 10 mg  1 mL Intradermal Once Azar eRyes MD         Outpatient Medications as of 4/4/2025   Medication Sig Dispense Refill    ALPRAZolam (XANAX) 0.5 MG tablet Take 0.5 mg by mouth every 12 (twelve) hours as needed for Anxiety.      azelastine (ASTELIN) 137 mcg (0.1 %) nasal spray 2 sprays by Nasal route 2 (two) times daily.      bisacodyL (DULCOLAX) 5 mg EC tablet Take 1 tablet (5 mg total) by mouth daily as needed for Constipation. 30 tablet 0    calcitonin, salmon, (FORTICAL) 200 unit/actuation nasal spray 1 spray by Nasal route once daily. Alternate nostrils. 3.7 mL 0    cholecalciferol, vitamin D3, (VITAMIN D3) 125 mcg (5,000 unit) Tab Take 5,000 Units by mouth once daily.      HYDROcodone-acetaminophen (NORCO)  mg per tablet Take 1 tablet by mouth every 6 (six) hours as needed for Pain. 28 tablet 0    lactulose (CHRONULAC) 20 gram/30 mL Soln Take 30 mLs (20 g total) by mouth 2 (two) times daily as needed (constipation). 900 mL 1    mometasone (NASONEX) 50 mcg/actuation nasal spray 2 sprays by Nasal route once daily. 17 g 1    omeprazole (PRILOSEC) 40 MG capsule Take 40 mg by mouth 2 (two) times daily.      sennosides (SENNA) 8.6 mg Cap Take 1 tablet by mouth daily as needed (constipation). 30 each 1     Past Medical History:   Diagnosis Date    Anemia, unspecified 08/18/2024    Anxiety     GERD (gastroesophageal reflux  disease)      Past Surgical History:   Procedure Laterality Date    CHOLECYSTECTOMY      COLONOSCOPY N/A 11/07/2022    Procedure: COLONOSCOPY;  Surgeon: Lex Morton MD;  Location: [facility] ENDO;  Service: General;  Laterality: N/A;    FRACTURE SURGERY      HEMIARTHROPLASTY OF HIP Right 11/13/2021    Procedure: HEMIARTHROPLASTY, HIP;  Surgeon: Dontae Lopez MD;  Location: [facility] OR;  Service: Orthopedics;  Laterality: Right;    INJECTION OF ANESTHETIC AGENT AROUND MEDIAL BRANCH NERVES INNERVATING LUMBAR FACET JOINT Bilateral 3/7/2025    Procedure: Block-nerve-medial branch-lumbar;  Surgeon: Azar Reyes MD;  Location: [facility] PAIN MANAGEMENT;  Service: Pain Management;  Laterality: Bilateral;    LAPAROSCOPIC CHOLECYSTECTOMY Bilateral 11/30/2022    Procedure: CHOLECYSTECTOMY-LAPAROSCOPIC;  Surgeon: Lex Morton MD;  Location: [facility] OR;  Service: General;  Laterality: Bilateral;     Review of patient's allergies indicates:   Allergen Reactions    Levaquin [levofloxacin] Hives      Current Facility-Administered Medications   Medication    lactated ringers infusion    LIDOcaine (PF) 10 mg/ml (1%) injection 10 mg       PMHx, PSHx, Allergies, Medications reviewed in epic    ROS negative except pain complaints in HPI    OBJECTIVE:    BP (!) 140/79 (BP Location: Right arm, Patient Position: Sitting)   Pulse 82   Temp 97.5 °F (36.4 °C) (Skin)   Resp 20   Wt 44.9 kg (98 lb 15.8 oz)   SpO2 99%   Breastfeeding No   BMI 19.33 kg/m²     PHYSICAL EXAMINATION:    GENERAL: Well appearing, in no acute distress, alert and oriented.  PSYCH:  Mood and affect appropriate.  SKIN: Skin color, texture, turgor normal in procedure area, no rashes or lesions which will impact the procedure.  CV: RRR with palpation of the radial artery.  PULM: No evidence of respiratory difficulty, symmetric chest rise. Clear to auscultation.  NEURO: Alert. Oriented. Speech fluent and appropriate. Moving all  extremities.    Plan:    Proceed with procedure as planned Procedure(s) (LRB):  Block-nerve-medial branch-lumbar l3,4,5 MBB #2 (Bilateral)    Azar Reyes  04/04/2025

## 2025-04-04 NOTE — H&P
HPI  Patient presenting for Procedure(s) (LRB):  Block-nerve-medial branch-lumbar l3,4,5 MBB #2 (Bilateral)     Patient on Anti-coagulation No    No health changes since previous encounter. No changes in pain since procedure was scheduled at previous visit. Denies any fevers or infections. Denies any issues with clotting or bleeding.     Facility-Administered Medications as of 4/4/2025   Medication Dose Route Frequency Provider Last Rate Last Admin    lactated ringers infusion   Intravenous Continuous Azar Reyes MD        LIDOcaine (PF) 10 mg/ml (1%) injection 10 mg  1 mL Intradermal Once Azar Reyes MD         Outpatient Medications as of 4/4/2025   Medication Sig Dispense Refill    ALPRAZolam (XANAX) 0.5 MG tablet Take 0.5 mg by mouth every 12 (twelve) hours as needed for Anxiety.      azelastine (ASTELIN) 137 mcg (0.1 %) nasal spray 2 sprays by Nasal route 2 (two) times daily.      bisacodyL (DULCOLAX) 5 mg EC tablet Take 1 tablet (5 mg total) by mouth daily as needed for Constipation. 30 tablet 0    calcitonin, salmon, (FORTICAL) 200 unit/actuation nasal spray 1 spray by Nasal route once daily. Alternate nostrils. 3.7 mL 0    cholecalciferol, vitamin D3, (VITAMIN D3) 125 mcg (5,000 unit) Tab Take 5,000 Units by mouth once daily.      HYDROcodone-acetaminophen (NORCO)  mg per tablet Take 1 tablet by mouth every 6 (six) hours as needed for Pain. 28 tablet 0    lactulose (CHRONULAC) 20 gram/30 mL Soln Take 30 mLs (20 g total) by mouth 2 (two) times daily as needed (constipation). 900 mL 1    mometasone (NASONEX) 50 mcg/actuation nasal spray 2 sprays by Nasal route once daily. 17 g 1    omeprazole (PRILOSEC) 40 MG capsule Take 40 mg by mouth 2 (two) times daily.      sennosides (SENNA) 8.6 mg Cap Take 1 tablet by mouth daily as needed (constipation). 30 each 1     Past Medical History:   Diagnosis Date    Anemia, unspecified 08/18/2024    Anxiety     GERD (gastroesophageal reflux  disease)      Past Surgical History:   Procedure Laterality Date    CHOLECYSTECTOMY      COLONOSCOPY N/A 11/07/2022    Procedure: COLONOSCOPY;  Surgeon: Lex Morton MD;  Location: [facility] ENDO;  Service: General;  Laterality: N/A;    FRACTURE SURGERY      HEMIARTHROPLASTY OF HIP Right 11/13/2021    Procedure: HEMIARTHROPLASTY, HIP;  Surgeon: Dontae Lopez MD;  Location: [facility] OR;  Service: Orthopedics;  Laterality: Right;    INJECTION OF ANESTHETIC AGENT AROUND MEDIAL BRANCH NERVES INNERVATING LUMBAR FACET JOINT Bilateral 3/7/2025    Procedure: Block-nerve-medial branch-lumbar;  Surgeon: Azar Reyes MD;  Location: [facility] A[facility] PAIN MANAGEMENT;  Service: Pain Management;  Laterality: Bilateral;    LAPAROSCOPIC CHOLECYSTECTOMY Bilateral 11/30/2022    Procedure: CHOLECYSTECTOMY-LAPAROSCOPIC;  Surgeon: Lex Morton MD;  Location: [facility] OR;  Service: General;  Laterality: Bilateral;     Review of patient's allergies indicates:   Allergen Reactions    Levaquin [levofloxacin] Hives      Current Facility-Administered Medications   Medication    lactated ringers infusion    LIDOcaine (PF) 10 mg/ml (1%) injection 10 mg       PMHx, PSHx, Allergies, Medications reviewed in epic    ROS negative except pain complaints in HPI    OBJECTIVE:    BP (!) 140/79 (BP Location: Right arm, Patient Position: Sitting)   Pulse 82   Temp 97.5 °F (36.4 °C) (Skin)   Resp 20   Wt 44.9 kg (98 lb 15.8 oz)   SpO2 99%   Breastfeeding No   BMI 19.33 kg/m²     PHYSICAL EXAMINATION:    GENERAL: Well appearing, in no acute distress, alert and oriented.  PSYCH:  Mood and affect appropriate.  SKIN: Skin color, texture, turgor normal in procedure area, no rashes or lesions which will impact the procedure.  CV: RRR with palpation of the radial artery.  PULM: No evidence of respiratory difficulty, symmetric chest rise. Clear to auscultation.  NEURO: Alert. Oriented. Speech fluent and appropriate. Moving all  extremities.    Plan:    Proceed with procedure as planned Procedure(s) (LRB):  Block-nerve-medial branch-lumbar l3,4,5 MBB #2 (Bilateral)    Azar Ryees  04/04/2025

## 2025-04-04 NOTE — OP NOTE
Diagnostic Lumbar Medial Branch Block Under Fluoroscopy    The procedure, risks, benefits, and options were discussed with the patient. There are no contraindications to the procedure. The patent expressed understanding and agreed to the procedure. Informed written consent was obtained prior to the start of the procedure and can be found in the patient's chart.    PATIENT NAME: Heri Jansen   MRN: [id number]     DATE OF PROCEDURE: 04/04/2025                                           PROCEDURE:  Diagnostic Bilateral L3, L4, and L5 Lumbar Medial Branch Block under Fluoroscopy    PRE-OP DIAGNOSIS: Spondylosis without myelopathy or radiculopathy, lumbar region [M47.816] Lumbar spondylosis [M47.816]    POST-OP DIAGNOSIS: Same    PHYSICIAN: Azar Reyes MD    ASSISTANTS:none    MEDICATIONS INJECTED:  Bupivicaine 0.25%    SEDATION: Versed 1mg and Fentanyl 0mcg                                                                                                                                                                                     Conscious sedation ordered by M.D. Patient re-evaluation prior to administration of conscious sedation. No changes noted in patient's status from initial evaluation. The patient's vital signs were monitored by RN and patient remained hemodynamically stable throughout the procedure.    Event Time In   Sedation Start 1256       ESTIMATED BLOOD LOSS:  None    COMPLICATIONS:  None.    INTERVAL HISTORY: Patient has clinical and imaging findings suggestive of facet mediated pain. Patient had a previous diagnostic block performed with at least 80% relief in pain and/or at least 50% improvement in the ability to perform previously painful movements and ADLs for the expected duration of the local anesthetic utilized.    TECHNIQUE: Time-out was performed to identify the patient and procedure to be performed. With the patient laying in a prone position, the surgical area was prepped and  draped in the usual sterile fashion using ChloraPrep and fenestrated drape. The levels were determined under fluoroscopic guidance. Skin anesthesia was achieved by injecting Lidocaine 1% over the injection sites. A 25 gauge, 3.5 inch needle was introduced into the medial branch nerves at the junctions of the superior articular process and the transverse processes of the targeted sites using AP, lateral and/or contralateral oblique fluoroscopic imaging. After negative aspiration for blood or CSF was confirmed, 0.5 mL of the anesthetic listed above was then slowly injected at each site. The needles were removed and bleeding was nil. A sterile dressing was applied. No specimens collected. The patient tolerated the procedure well.    The patient was monitored after the procedure in the recovery area. They were given post-procedure and discharge instructions to follow at home. The patient was discharged in a stable condition.    Azar Reyes MD

## 2025-04-07 ENCOUNTER — TELEPHONE (OUTPATIENT)
Dept: PAIN MEDICINE | Facility: CLINIC | Age: 75
End: 2025-04-07
Payer: MEDICARE

## 2025-04-07 VITALS
WEIGHT: 99 LBS | RESPIRATION RATE: 18 BRPM | DIASTOLIC BLOOD PRESSURE: 73 MMHG | OXYGEN SATURATION: 97 % | SYSTOLIC BLOOD PRESSURE: 127 MMHG | HEART RATE: 80 BPM | BODY MASS INDEX: 19.33 KG/M2 | TEMPERATURE: 98 F

## 2025-04-07 DIAGNOSIS — M47.816 SPONDYLOSIS WITHOUT MYELOPATHY OR RADICULOPATHY, LUMBAR REGION: Primary | ICD-10-CM

## 2025-04-07 NOTE — TELEPHONE ENCOUNTER
Spoke with son in law pt had 85% relief x 3 hours his starting pain score was 7 and post procedure pain score was 2 .  Scheduled for 4/30  went over instructions

## 2025-04-07 NOTE — TELEPHONE ENCOUNTER
Regarding your Lumbar Medial Branch Block , For Date of Service: 4/4     Please answer the following questions:    1. What percentage of pain relief did you receive following the block, from 0-100%?     2. How many hours did pain relief last following the block?      3. During this time please describe in detail the activities you were able to do?    4. Pain score from 0-10 pre procedure -      5. Pain score from 0-10 during block  -     6. Return to baseline pain score of 0-10 once the block wore off -            Will call pt to get relief

## 2025-04-30 ENCOUNTER — HOSPITAL ENCOUNTER (OUTPATIENT)
Facility: HOSPITAL | Age: 75
Discharge: HOME | End: 2025-04-30
Attending: STUDENT IN AN ORGANIZED HEALTH CARE EDUCATION/TRAINING PROGRAM | Admitting: STUDENT IN AN ORGANIZED HEALTH CARE EDUCATION/TRAINING PROGRAM
Payer: MEDICARE

## 2025-04-30 DIAGNOSIS — M47.896 OTHER SPONDYLOSIS, LUMBAR REGION: ICD-10-CM

## 2025-04-30 PROCEDURE — 99152 MOD SED SAME PHYS/QHP 5/>YRS: CPT | Performed by: STUDENT IN AN ORGANIZED HEALTH CARE EDUCATION/TRAINING PROGRAM

## 2025-04-30 PROCEDURE — 63600175 PHARM REV CODE 636 W HCPCS: Performed by: STUDENT IN AN ORGANIZED HEALTH CARE EDUCATION/TRAINING PROGRAM

## 2025-04-30 PROCEDURE — 99153 MOD SED SAME PHYS/QHP EA: CPT | Performed by: STUDENT IN AN ORGANIZED HEALTH CARE EDUCATION/TRAINING PROGRAM

## 2025-04-30 PROCEDURE — 64635 DESTROY LUMB/SAC FACET JNT: CPT | Mod: 50,,, | Performed by: STUDENT IN AN ORGANIZED HEALTH CARE EDUCATION/TRAINING PROGRAM

## 2025-04-30 PROCEDURE — 64636 DESTROY L/S FACET JNT ADDL: CPT | Mod: 50,,, | Performed by: STUDENT IN AN ORGANIZED HEALTH CARE EDUCATION/TRAINING PROGRAM

## 2025-04-30 PROCEDURE — 64635 DESTROY LUMB/SAC FACET JNT: CPT | Mod: 50 | Performed by: STUDENT IN AN ORGANIZED HEALTH CARE EDUCATION/TRAINING PROGRAM

## 2025-04-30 PROCEDURE — 64636 DESTROY L/S FACET JNT ADDL: CPT | Mod: 50 | Performed by: STUDENT IN AN ORGANIZED HEALTH CARE EDUCATION/TRAINING PROGRAM

## 2025-04-30 RX ORDER — METHYLPREDNISOLONE ACETATE 80 MG/ML
INJECTION, SUSPENSION INTRA-ARTICULAR; INTRALESIONAL; INTRAMUSCULAR; SOFT TISSUE
Status: DISCONTINUED | OUTPATIENT
Start: 2025-04-30 | End: 2025-04-30 | Stop reason: HOSPADM

## 2025-04-30 RX ORDER — FENTANYL CITRATE 50 UG/ML
INJECTION, SOLUTION INTRAMUSCULAR; INTRAVENOUS
Status: DISCONTINUED | OUTPATIENT
Start: 2025-04-30 | End: 2025-04-30 | Stop reason: HOSPADM

## 2025-04-30 RX ORDER — SODIUM CHLORIDE, SODIUM LACTATE, POTASSIUM CHLORIDE, CALCIUM CHLORIDE 600; 310; 30; 20 MG/100ML; MG/100ML; MG/100ML; MG/100ML
INJECTION, SOLUTION INTRAVENOUS CONTINUOUS
Status: DISCONTINUED | OUTPATIENT
Start: 2025-04-30 | End: 2025-04-30 | Stop reason: HOSPADM

## 2025-04-30 RX ORDER — MIDAZOLAM HYDROCHLORIDE 1 MG/ML
INJECTION INTRAMUSCULAR; INTRAVENOUS
Status: DISCONTINUED | OUTPATIENT
Start: 2025-04-30 | End: 2025-04-30 | Stop reason: HOSPADM

## 2025-04-30 RX ORDER — LIDOCAINE HYDROCHLORIDE 10 MG/ML
INJECTION, SOLUTION EPIDURAL; INFILTRATION; INTRACAUDAL; PERINEURAL
Status: DISCONTINUED | OUTPATIENT
Start: 2025-04-30 | End: 2025-04-30 | Stop reason: HOSPADM

## 2025-04-30 RX ORDER — LIDOCAINE HYDROCHLORIDE 20 MG/ML
INJECTION, SOLUTION EPIDURAL; INFILTRATION; INTRACAUDAL; PERINEURAL
Status: DISCONTINUED | OUTPATIENT
Start: 2025-04-30 | End: 2025-04-30 | Stop reason: HOSPADM

## 2025-04-30 RX ORDER — LIDOCAINE HYDROCHLORIDE 10 MG/ML
1 INJECTION, SOLUTION EPIDURAL; INFILTRATION; INTRACAUDAL; PERINEURAL ONCE
Status: DISCONTINUED | OUTPATIENT
Start: 2025-04-30 | End: 2025-04-30 | Stop reason: HOSPADM

## 2025-04-30 NOTE — DISCHARGE SUMMARY
[facility]  Discharge Note  Short Stay     Admit Date: 4/30/2025    Discharge Date: 4/30/2025     Attending Physician: Azar Reyes MD    Diagnoses:  Lumbar spondylosis    Discharged Condition: Good     Hospital Course: Patient was admitted for an outpatient interventional pain management procedure and tolerated the procedure well with no complications.     Final Diagnoses: Same as principal problem.     Disposition: Home or Self Care     Follow up/Patient Instructions:   Follow-up in 4 weeks unless otherwise instructed. May return sooner as needed.       Reconciled Medications:     Medication List        CONTINUE taking these medications      ALPRAZolam 0.5 MG tablet  Commonly known as: XANAX  Take 0.5 mg by mouth every 12 (twelve) hours as needed for Anxiety.     azelastine 137 mcg (0.1 %) nasal spray  Commonly known as: ASTELIN  2 sprays by Nasal route 2 (two) times daily.     bisacodyL 5 mg EC tablet  Commonly known as: DULCOLAX  Take 1 tablet (5 mg total) by mouth daily as needed for Constipation.     calcitonin (salmon) 200 unit/actuation nasal spray  Commonly known as: FORTICAL  1 spray by Nasal route once daily. Alternate nostrils.     HYDROcodone-acetaminophen  mg per tablet  Commonly known as: NORCO  Take 1 tablet by mouth every 6 (six) hours as needed for Pain.     lactulose 20 gram/30 mL Soln  Commonly known as: CHRONULAC  Take 30 mLs (20 g total) by mouth 2 (two) times daily as needed (constipation).     mometasone 50 mcg/actuation nasal spray  Commonly known as: NASONEX  2 sprays by Nasal route once daily.     omeprazole 40 MG capsule  Commonly known as: PRILOSEC  Take 40 mg by mouth 2 (two) times daily.     SENNA 8.6 mg Cap  Generic drug: sennosides  Take 1 tablet by mouth daily as needed (constipation).     VITAMIN D3 125 mcg (5,000 unit) Tab  Generic drug: cholecalciferol (vitamin D3)  Take 5,000 Units by mouth once daily.             Discharge Procedure Orders (must include  Diet, Follow-up, Activity)   Ice to affected area   Order Comments: 20 minutes of ice or until area numb to the touch if area is sore 2-3 times per day as needed     No driving until:   Order Comments: Until following day     No dressing needed     Notify your health care provider if you experience any of the following:  temperature >100.4     Notify your health care provider if you experience any of the following:  persistent nausea and vomiting or diarrhea     Notify your health care provider if you experience any of the following:  severe uncontrolled pain     Notify your health care provider if you experience any of the following:  redness, tenderness, or signs of infection (pain, swelling, redness, odor or green/yellow discharge around incision site)     Notify your health care provider if you experience any of the following:  difficulty breathing or increased cough     Notify your health care provider if you experience any of the following:  severe persistent headache     Notify your health care provider if you experience any of the following:  worsening rash     Notify your health care provider if you experience any of the following:  persistent dizziness, light-headedness, or visual disturbances     Notify your health care provider if you experience any of the following:  increased confusion or weakness     Shower on day dressing removed (No bath)       Azar Reyes MD  Interventional Pain Medicine / Physical Medicine & Rehabilitation

## 2025-04-30 NOTE — OP NOTE
Therapeutic Lumbar Medial Branch Radiofrequency Ablation under Fluoroscopy     The procedure, risks, benefits, and options were discussed with the patient. There are no contraindications to the procedure. The patent expressed understanding and agreed to the procedure. Informed written consent was obtained prior to the start of the procedure and can be found in the patient's chart.        PATIENT NAME: Heri Jansen   MRN: [id number]     DATE OF PROCEDURE: 04/30/2025     PROCEDURE:  Bilateral L3, L4, and L5 Lumbar Radiofrequency Ablation under Fluoroscopy    PRE-OP DIAGNOSIS: Spondylosis without myelopathy or radiculopathy, lumbar region [M47.816] Lumbar spondylosis [M47.816]    POST-OP DIAGNOSIS: Same    PHYSICIAN: Azar Reyes MD    ASSISTANTS: none     MEDICATIONS INJECTED:  Preservative-free Decadron 10mg with 9cc of Bupivicaine 0.25%    LOCAL ANESTHETIC INJECTED:   Xylocaine 2%    SEDATION: Versed 1mg and Fentanyl 25mcg                                                                                                                                                                                     Conscious sedation ordered by M.D. Patient re-evaluation prior to administration of conscious sedation. No changes noted in patient's status from initial evaluation. The patient's vital signs were monitored by RN and patient remained hemodynamically stable throughout the procedure.    Event Time In   Sedation Start 1053   Sedation End 1123       ESTIMATED BLOOD LOSS:  None    COMPLICATIONS:  None     INTERVAL HISTORY: Patient has clinical and imaging findings suggestive of facet mediated pain. Patients has completed 2 previous diagnostic medial branch blocks at specified levels with at least 80% relief for the expected duration of the local anesthetic utilized.    TECHNIQUE: Time-out was performed to identify the patient and procedure to be performed. With the patient laying in a prone position, the surgical  area was prepped and draped in the usual sterile fashion using ChloraPrep and fenestrated drape. The levels were determined under fluoroscopic guidance. Skin anesthesia was achieved by injecting Lidocaine 2% over the injection sites. A 18 gauge 10mm curved active tip needle was introduced to the anatomic local of the medial branch at each of the above levels using AP, lateral and/or contralateral oblique fluoroscopic imaging. Then sensory and motor testing was performed to confirm that the needle tips were in the correct location. After negative aspiration for blood or CSF was confirmed, 1 mL of the lidocaine 2% listed above was injected slowly at each site. This was followed by thermal lesioning at 80 degrees celsius for 90 seconds. That was followed by slowly injecting 1 mL of the medication mixture listed above at each site. The needles were removed and bleeding was nil. A sterile dressing was applied. No specimens collected. The patient tolerated the procedure well and did not have any procedure related motor deficit at the conclusion of the procedure.    The patient was monitored after the procedure in the recovery area. They were given post-procedure and discharge instructions to follow at home. The patient was discharged in a stable condition.    Azar Reyes MD

## 2025-04-30 NOTE — INTERVAL H&P NOTE
The patient has been examined and the H&P has been reviewed:    I concur with the findings and no changes have occurred since H&P was written.    Anesthesia/Surgery risks, benefits and alternative options discussed and understood by patient/family.  Regular rate, normal work of breathing    There are no hospital problems to display for this patient.

## 2025-04-30 NOTE — PLAN OF CARE
Tolerated procedure well. Denies pain at this time. Transferred out of facility via wheelchair to family without incident. Discharge instructions and ice pack in hand upon departure.

## 2025-05-01 ENCOUNTER — TELEPHONE (OUTPATIENT)
Dept: PAIN MEDICINE | Facility: CLINIC | Age: 75
End: 2025-05-01
Payer: MEDICARE

## 2025-05-01 VITALS
DIASTOLIC BLOOD PRESSURE: 77 MMHG | SYSTOLIC BLOOD PRESSURE: 138 MMHG | OXYGEN SATURATION: 100 % | TEMPERATURE: 98 F | BODY MASS INDEX: 19.33 KG/M2 | HEART RATE: 75 BPM | RESPIRATION RATE: 28 BRPM | WEIGHT: 99 LBS

## 2025-05-01 DIAGNOSIS — M47.816 SPONDYLOSIS WITHOUT MYELOPATHY OR RADICULOPATHY, LUMBAR REGION: Primary | ICD-10-CM

## 2025-05-01 RX ORDER — HYDROCODONE BITARTRATE AND ACETAMINOPHEN 5; 325 MG/1; MG/1
1 TABLET ORAL EVERY 12 HOURS PRN
Qty: 14 TABLET | Refills: 0 | Status: SHIPPED | OUTPATIENT
Start: 2025-05-01

## 2025-05-01 NOTE — TELEPHONE ENCOUNTER
Called pt she just had ablation yesterday. Pt states she has burning  by her  shoulders mid back, she does not have any Red Flags symptoms. Advised that she could have some numbness or burning in areas that we did inject and medication given can cause some side effects , the position pt was in during procedure etc. She verbalizes understanding. Pt also want to know if she can have a refill of Norco until the nerve burn starts to work I did advise I would ask but usually after we finish a procedure the goal is to reduce or eliminate the pain medication if we can. Please advise. Thank you.

## 2025-05-01 NOTE — TELEPHONE ENCOUNTER
----- Message from Lindy sent at 5/1/2025  1:02 PM CDT -----  Name of Who is Calling:PT What is the request in detail:Pt would like a callback from the office in regards to questions about numb burning feeling in lower back and a little soreness after recent nerve burn.Pt does not have kalie other symptoms.Please advise thank you Can the clinic reply by [facility]:NO What Number to Call Back if not in [facility]:Telephone Information:[phone]

## 2025-06-12 ENCOUNTER — OFFICE VISIT (OUTPATIENT)
Dept: PAIN MEDICINE | Facility: CLINIC | Age: 75
End: 2025-06-12
Payer: MEDICARE

## 2025-06-12 VITALS — BODY MASS INDEX: 19.44 KG/M2 | WEIGHT: 99 LBS | HEIGHT: 60 IN

## 2025-06-12 DIAGNOSIS — M47.816 SPONDYLOSIS WITHOUT MYELOPATHY OR RADICULOPATHY, LUMBAR REGION: Primary | ICD-10-CM

## 2025-06-12 DIAGNOSIS — M81.0 OSTEOPOROSIS, UNSPECIFIED OSTEOPOROSIS TYPE, UNSPECIFIED PATHOLOGICAL FRACTURE PRESENCE: ICD-10-CM

## 2025-06-12 PROCEDURE — 99999 PR PBB SHADOW E&M-EST. PATIENT-LVL III: CPT | Mod: PBBFAC,,, | Performed by: STUDENT IN AN ORGANIZED HEALTH CARE EDUCATION/TRAINING PROGRAM

## 2025-06-12 PROCEDURE — 99214 OFFICE O/P EST MOD 30 MIN: CPT | Mod: S$PBB,,, | Performed by: STUDENT IN AN ORGANIZED HEALTH CARE EDUCATION/TRAINING PROGRAM

## 2025-06-12 PROCEDURE — 99213 OFFICE O/P EST LOW 20 MIN: CPT | Mod: PBBFAC,PN | Performed by: STUDENT IN AN ORGANIZED HEALTH CARE EDUCATION/TRAINING PROGRAM

## 2025-06-12 RX ORDER — MELOXICAM 7.5 MG/1
7.5 TABLET ORAL DAILY PRN
Qty: 30 TABLET | Refills: 0 | Status: SHIPPED | OUTPATIENT
Start: 2025-06-12 | End: 2025-07-12

## 2025-06-12 NOTE — PROGRESS NOTES
[facility]    PCP:   Jai White MD    CHIEF COMPLAINT:   Procedure follow up    HISTORY OF PRESENT ILLNESS:   Heri Jansen presents for follow-up after lumbar RFA performed 6 weeks ago.  She reports a meaningful reduction in her pain since that procedure.  It has improved the frequency and severity of her pain by at least 50%.  No new questions or concerns.    No change in allergies, medical history, social history, or surgical history since our procedure.    PHYSICAL EXAMINATION  VITALS:   Vitals:    06/12/25 1110   Weight: 44.9 kg (98 lb 15.8 oz)   Height: 5' (1.524 m)   PainSc: 0-No pain     Physical Exam  Vitals reviewed.   Constitutional:       Appearance: Normal appearance.   HENT:      Head: Normocephalic.   Cardiovascular:      Rate and Rhythm: Normal rate.   Pulmonary:      Effort: Pulmonary effort is normal.   Musculoskeletal:         General: No deformity. Normal range of motion.   Neurological:      Mental Status: She is alert and oriented to person, place, and time. Mental status is at baseline.   Psychiatric:         Mood and Affect: Mood normal.         Behavior: Behavior normal.         ASSESSMENT:   75 y.o. year old female with good response to recent lumbar RFA.    Encounter Diagnoses   Name Primary?    Spondylosis without myelopathy or radiculopathy, lumbar region Yes    Osteoporosis, unspecified osteoporosis type, unspecified pathological fracture presence        PLAN:  Discussed prognosis.  Mobic 7.5 mg daily p.r.n. in case she has any pain flares  Follow up in 6 months      Azar Reyes MD  This note was completed with dictation software and grammatical/syntax errors may exist.

## 2025-07-30 ENCOUNTER — TELEPHONE (OUTPATIENT)
Dept: PAIN MEDICINE | Facility: CLINIC | Age: 75
End: 2025-07-30
Payer: MEDICARE

## 2025-07-30 NOTE — TELEPHONE ENCOUNTER
Copied from CRM #[id number]. Topic: General Inquiry - Patient Advice  >> Jul 30, 2025  2:06 PM Evelyn Heri wrote:  Type:  Needs Medical Advice    Who Called: pt    Regarding (please be specific): FMLA Return to Work Paperwork    Would the patient rather a call back or a response via [employer]? Call back    Best Call Back Number: [phone]    Additional Information: Pt inquiring if the office has received paperwork from [employer] for FMLA; pt requesting call back to confirm.

## 2025-07-31 ENCOUNTER — TELEPHONE (OUTPATIENT)
Dept: PAIN MEDICINE | Facility: CLINIC | Age: 75
End: 2025-07-31

## 2025-07-31 ENCOUNTER — OFFICE VISIT (OUTPATIENT)
Dept: PAIN MEDICINE | Facility: CLINIC | Age: 75
End: 2025-07-31
Payer: MEDICARE

## 2025-07-31 VITALS
BODY MASS INDEX: 18.69 KG/M2 | WEIGHT: 99 LBS | HEART RATE: 100 BPM | SYSTOLIC BLOOD PRESSURE: 131 MMHG | DIASTOLIC BLOOD PRESSURE: 65 MMHG | HEIGHT: 61 IN

## 2025-07-31 DIAGNOSIS — S32.000S COMPRESSION FRACTURE OF LUMBAR VERTEBRA, UNSPECIFIED LUMBAR VERTEBRAL LEVEL, SEQUELA: ICD-10-CM

## 2025-07-31 DIAGNOSIS — M47.816 SPONDYLOSIS WITHOUT MYELOPATHY OR RADICULOPATHY, LUMBAR REGION: Primary | ICD-10-CM

## 2025-07-31 DIAGNOSIS — M54.16 LUMBAR RADICULITIS: ICD-10-CM

## 2025-07-31 PROCEDURE — 99213 OFFICE O/P EST LOW 20 MIN: CPT | Mod: PBBFAC,PN | Performed by: STUDENT IN AN ORGANIZED HEALTH CARE EDUCATION/TRAINING PROGRAM

## 2025-07-31 PROCEDURE — 99214 OFFICE O/P EST MOD 30 MIN: CPT | Mod: S$PBB,,, | Performed by: STUDENT IN AN ORGANIZED HEALTH CARE EDUCATION/TRAINING PROGRAM

## 2025-07-31 PROCEDURE — 99999 PR PBB SHADOW E&M-EST. PATIENT-LVL III: CPT | Mod: PBBFAC,,, | Performed by: STUDENT IN AN ORGANIZED HEALTH CARE EDUCATION/TRAINING PROGRAM

## 2025-07-31 RX ORDER — GABAPENTIN 100 MG/1
100 CAPSULE ORAL 3 TIMES DAILY PRN
Qty: 90 CAPSULE | Refills: 2 | Status: SHIPPED | OUTPATIENT
Start: 2025-07-31 | End: 2025-10-29

## 2025-07-31 NOTE — PROGRESS NOTES
"[facility]    PCP:   Jai White MD    CHIEF COMPLAINT:   Procedure follow up    HISTORY OF PRESENT ILLNESS:   Heri Jansen presents for follow-up after lumbar RFA performed about 3 months ago. She reports continued improvement in her lower back pain since the procedure. She has a new pain in the region which she describes as an "icy pressure" and "restless legs" which is different than the previous aches and stabbing pains. This appears in variable locations from the thoracolumbar spine to the legs. No trauma. No red flags. She wants to return to work.     No change in allergies, medical history, social history, or surgical history since our procedure.    PHYSICAL EXAMINATION  VITALS:   Vitals:    07/31/25 1319   BP: 131/65   Pulse: 100   Weight: 44.9 kg (98 lb 15.8 oz)   Height: 5' 1" (1.549 m)   PainSc: 10-Worst pain ever   PainLoc: Back     Physical Exam  Vitals reviewed.   Constitutional:       Appearance: Normal appearance.   HENT:      Head: Normocephalic.   Cardiovascular:      Rate and Rhythm: Normal rate.   Pulmonary:      Effort: Pulmonary effort is normal.   Musculoskeletal:         General: No deformity. Normal range of motion.   Neurological:      Mental Status: She is alert and oriented to person, place, and time. Mental status is at baseline.   Psychiatric:         Mood and Affect: Mood normal.         Behavior: Behavior normal.       ASSESSMENT:   75 y.o. year old female with good response to recent lumbar RFA. New pains are probably radicular in origin but given their inconsistent presence it is hard to know for certain.   Encounter Diagnoses   Name Primary?    Spondylosis without myelopathy or radiculopathy, lumbar region Yes    Compression fracture of lumbar vertebra, unspecified lumbar vertebral level, sequela     Lumbar radiculitis      PLAN:  Provide clearance to return to work  Trial gabapentin 100mg TID for the neuropathic pains. Discussed side effects.  Referral to " PT  F/u PRN      Azar Reyes MD  This note was completed with dictation software and grammatical/syntax errors may exist.

## 2025-07-31 NOTE — TELEPHONE ENCOUNTER
Copied from CRM #[id number]. Topic: General Inquiry - Patient Advice  >> Jul 31, 2025  3:15 PM Arpita wrote:  Type: Needs Medical Advice         Who Called: Diomedes Grant Call Back Number:  [phone]   Additional Information: Requesting a call back regarding  [employer] paperwork needs to be sent to the following fax#  [phone]   jessie  # [phone] .Daughter asking for office to please call her also.   Please Advise- Thank you

## (undated) DEVICE — SOL CLEARIFY VISUALIZATION LAP

## (undated) DEVICE — DRAPE INCISE IOBAN 2 23X17IN

## (undated) DEVICE — GLOVE SENSICARE PI GRN 7.5

## (undated) DEVICE — TUBING SUC UNIV W/CONN 12FT

## (undated) DEVICE — SPONGE BULKEE II ABSRB 6X6.75

## (undated) DEVICE — SYR ONLY LUER LOCK 20CC

## (undated) DEVICE — SUT 0 VICRYL / CT-1

## (undated) DEVICE — APPLICATOR CHLORAPREP ORN 26ML

## (undated) DEVICE — ADHESIVE DERMABOND ADVANCED

## (undated) DEVICE — DRAPE C-ARMOR EQUIPMENT COVER

## (undated) DEVICE — SUT 2-0 VICRYL / CT-1

## (undated) DEVICE — CANISTER SUCTION 3000CC

## (undated) DEVICE — DRAPE INCISE IOBAN 2 23X33IN

## (undated) DEVICE — Device

## (undated) DEVICE — SYS LABEL CORRECT MED

## (undated) DEVICE — SEALER BIPOLAR TISSUE 6.0

## (undated) DEVICE — UNDERGLOVES BIOGEL PI SZ 7 LF

## (undated) DEVICE — SYR 30CC LUER LOCK

## (undated) DEVICE — SOL IRR NACL .9% 3000ML

## (undated) DEVICE — SUT CTD VICRYL 3-0 CR/SH

## (undated) DEVICE — UNDERGLOVES BIOGEL PI SIZE 8.5

## (undated) DEVICE — SUT CTD VICRYL 4-0 BR PS-2

## (undated) DEVICE — SOL NACL 0.9% INJ 500ML BG

## (undated) DEVICE — GLOVE SURG ULTRA TOUCH 8.5

## (undated) DEVICE — DRESSING GAUZE OIL EMUL 3X8

## (undated) DEVICE — ELECTRODE LAP WIRE J-HOOK 36CM

## (undated) DEVICE — SYR B-D DISP CONTROL 10CC100/C

## (undated) DEVICE — NDL SPINAL 25GX3.5 SPINOCAN

## (undated) DEVICE — DRAPE ABDOMINAL TIBURON 14X11

## (undated) DEVICE — PACK BASIC

## (undated) DEVICE — GLOVE SURG ULTRA TOUCH 7.5

## (undated) DEVICE — SUT 1 36IN COATED VICRYL VI

## (undated) DEVICE — STRAP OR TABLE 5IN X 72IN

## (undated) DEVICE — BAG TISS RETRV MONARCH 10MM

## (undated) DEVICE — CHLORAPREP 10.5 ML APPLICATOR

## (undated) DEVICE — GLOVE SURG ULTRA TOUCH 7

## (undated) DEVICE — PAD GROUNDING DISPER ELECTRODE

## (undated) DEVICE — FILTER LAPAROSCOPIC SMOKE EVAC

## (undated) DEVICE — ELECTRODE REM PLYHSV RETURN 9

## (undated) DEVICE — CATH EXTERNAL WICK PUREWICK

## (undated) DEVICE — LABEL FOR UTILITY MARKER

## (undated) DEVICE — KIT ENDO CARRY-ON PROC 100310

## (undated) DEVICE — BNDG COFLEX FOAM LF2 ST 6X5YD

## (undated) DEVICE — INTERPULSE SET

## (undated) DEVICE — SEE MEDLINE ITEM 146292

## (undated) DEVICE — SOL WATER STRL IRR 1000ML

## (undated) DEVICE — BLADE SAW SGTL DL STR 25X1.27X

## (undated) DEVICE — SEE MEDLINE ITEM 157118

## (undated) DEVICE — PACK CUSTOM UNIV BASIN SLI

## (undated) DEVICE — DRAPE HIP TIBURON 87X115X134

## (undated) DEVICE — GOWN POLY REINF BRTH SLV LG

## (undated) DEVICE — TUBE SUCTION YANKAUER HI CAP

## (undated) DEVICE — APPLIER CLIP ENDO LIGAMAX 5MM

## (undated) DEVICE — TROCAR ENDO Z THREAD KII 5X100

## (undated) DEVICE — SOL 9P NACL IRR PIC IL

## (undated) DEVICE — MANIFOLD 4 PORT

## (undated) DEVICE — DRAPE STERI-DRAPE 1000 17X11IN

## (undated) DEVICE — DRAPE C ARM 42 X 120 10/BX

## (undated) DEVICE — SUT 0 VICRYL / UR6 (J603)

## (undated) DEVICE — TUBING PNEUMO

## (undated) DEVICE — DRAPE STERI U-SHAPED 47X51IN

## (undated) DEVICE — IRRIGATOR SUCTION W/TIP

## (undated) DEVICE — KIT CANIST SUCTION 1200CC

## (undated) DEVICE — SCISSOR TIP ENDOCUT DISPOSABLE

## (undated) DEVICE — CUBE COLD THERAPY POLAR CARE

## (undated) DEVICE — PAD SUREFIT GRND ELECTRD 10FT

## (undated) DEVICE — NDL 10CC 20GAX1 COMBO

## (undated) DEVICE — SPONGE LAP 18X18 PREWASHED

## (undated) DEVICE — STAPLER SKIN ROTATING HEAD

## (undated) DEVICE — SYR SLIP TIP 5CC

## (undated) DEVICE — TOWEL OR DISP STRL BLUE 4/PK

## (undated) DEVICE — DRAPE MEDIUM SHEET 40X70IN

## (undated) DEVICE — SEE MEDLINE ITEM 107746

## (undated) DEVICE — LINER SUCTION 3000CC

## (undated) DEVICE — CANNULA RADIOPAQUE 20G CURVED

## (undated) DEVICE — CANNULA LAP SEAL Z THRD 5X100

## (undated) DEVICE — ELECTRODE BLD EXT 6.50 ST DISP

## (undated) DEVICE — TROCAR KII BLLN 12MM 10CM

## (undated) DEVICE — SEE MEDLINE ITEM 157216

## (undated) DEVICE — SEE MEDLINE ITEM 157166

## (undated) DEVICE — GLOVE SURGEONS ULTRA TOUCH 6.5

## (undated) DEVICE — PAD ABD 8X10 STERILE

## (undated) DEVICE — SLEEVE SCD EXPRESS KNEE MEDIUM